# Patient Record
Sex: FEMALE | Race: BLACK OR AFRICAN AMERICAN | Employment: UNEMPLOYED | URBAN - METROPOLITAN AREA
[De-identification: names, ages, dates, MRNs, and addresses within clinical notes are randomized per-mention and may not be internally consistent; named-entity substitution may affect disease eponyms.]

---

## 2017-05-22 ENCOUNTER — HOSPITAL ENCOUNTER (INPATIENT)
Facility: HOSPITAL | Age: 47
LOS: 9 days | Discharge: HOME | DRG: 987 | End: 2017-05-31
Attending: HOSPITALIST | Admitting: HOSPITALIST
Payer: MEDICARE

## 2017-05-22 DIAGNOSIS — D57.00 SICKLE CELL CRISIS: ICD-10-CM

## 2017-05-22 DIAGNOSIS — A41.9 SEPSIS, DUE TO UNSPECIFIED ORGANISM: Primary | ICD-10-CM

## 2017-05-22 DIAGNOSIS — R00.0 TACHYCARDIA: ICD-10-CM

## 2017-05-22 DIAGNOSIS — M00.9 PYOGENIC ARTHRITIS OF RIGHT WRIST, DUE TO UNSPECIFIED ORGANISM: ICD-10-CM

## 2017-05-22 DIAGNOSIS — M87.9 OSTEONECROSIS: ICD-10-CM

## 2017-05-22 DIAGNOSIS — M86.00 ACUTE HEMATOGENOUS OSTEOMYELITIS, UNSPECIFIED SITE: ICD-10-CM

## 2017-05-22 DIAGNOSIS — R78.81 GRAM-NEGATIVE BACTEREMIA: ICD-10-CM

## 2017-05-22 PROCEDURE — 20600001 HC STEP DOWN PRIVATE ROOM

## 2017-05-22 PROCEDURE — 63600175 PHARM REV CODE 636 W HCPCS: Performed by: HOSPITALIST

## 2017-05-22 PROCEDURE — 99223 1ST HOSP IP/OBS HIGH 75: CPT | Mod: AI,,, | Performed by: NURSE PRACTITIONER

## 2017-05-22 RX ORDER — HYDROMORPHONE HYDROCHLORIDE 1 MG/ML
1 INJECTION, SOLUTION INTRAMUSCULAR; INTRAVENOUS; SUBCUTANEOUS ONCE
Status: COMPLETED | OUTPATIENT
Start: 2017-05-22 | End: 2017-05-22

## 2017-05-22 RX ADMIN — HYDROMORPHONE HYDROCHLORIDE 1 MG: 1 INJECTION, SOLUTION INTRAMUSCULAR; INTRAVENOUS; SUBCUTANEOUS at 11:05

## 2017-05-22 NOTE — PLAN OF CARE
Outside Transfer Acceptance Note    Transferring Physician or Mid Level Provider/Speciality: Bharati Martinez    Accepting Physician: Estelita Vann     Date of Acceptance: 05/22/2017     Code Status: Full    Transferring Facility/Hospital: [facility]    Reason for Transfer to [facility]: left shoulder pain, sickle cell crisis    Report from Transferring Physician or Mid-Level provider/Hospital course: 46 F with a PMH of HTN and sickle cell anemia who presented in sickle cell pain crisis. Since admit, she began having fevers and blood cx showed GNR.  She is also complaining of pain in both arms and bilateral forearms X rays are showing periosteal disease concerning for possible osteomyelitis versus metastatic disease.  PT WILL NEED BONE BIOPSY. She was originally started on cefepime for bacteremia, but is now on meropenem. She had a port in place due to mutiple admissions and poor IV access, but this is being removed today due to bacteremia.     To do list upon patient arrival: consult heme onc, ortho, and ID. Repeat imaging of the forearms.     Please call extension [phone] upon patient arrival to floor for Hospital Medicine admit team assignment and for additional admit orders. If patient is coming from another [facility] facility please also call [phone] to inform the admit team/office that patient has arrived from the [facility] facility to the floor so patient can be evaluated.

## 2017-05-23 PROBLEM — E87.6 HYPOKALEMIA: Status: ACTIVE | Noted: 2017-05-23

## 2017-05-23 PROBLEM — E83.42 HYPOMAGNESEMIA: Status: ACTIVE | Noted: 2017-05-23

## 2017-05-23 PROBLEM — M87.9 OSTEONECROSIS: Status: ACTIVE | Noted: 2017-05-23

## 2017-05-23 PROBLEM — R78.81 GRAM-NEGATIVE BACTEREMIA: Status: ACTIVE | Noted: 2017-05-23

## 2017-05-23 PROBLEM — E83.39 HYPOPHOSPHATEMIA: Status: ACTIVE | Noted: 2017-05-23

## 2017-05-23 LAB
ABO + RH BLD: NORMAL
ALBUMIN SERPL BCP-MCNC: 2 G/DL
ALP SERPL-CCNC: 154 U/L
ALT SERPL W/O P-5'-P-CCNC: 23 U/L
ANION GAP SERPL CALC-SCNC: 8 MMOL/L
ANION GAP SERPL CALC-SCNC: 8 MMOL/L
AST SERPL-CCNC: 39 U/L
BASOPHILS # BLD AUTO: 0.01 K/UL
BASOPHILS NFR BLD: 0.2 %
BILIRUB SERPL-MCNC: 1.3 MG/DL
BILIRUB UR QL STRIP: NEGATIVE
BLD GP AB SCN CELLS X3 SERPL QL: NORMAL
BLOOD GROUP ANTIBODIES SERPL: NORMAL
BUN SERPL-MCNC: 10 MG/DL
BUN SERPL-MCNC: 6 MG/DL
C DIFF GDH STL QL: NEGATIVE
C DIFF TOX A+B STL QL IA: NEGATIVE
CALCIUM SERPL-MCNC: 7.9 MG/DL
CALCIUM SERPL-MCNC: 8.4 MG/DL
CHLORIDE SERPL-SCNC: 104 MMOL/L
CHLORIDE SERPL-SCNC: 105 MMOL/L
CLARITY UR REFRACT.AUTO: CLEAR
CO2 SERPL-SCNC: 24 MMOL/L
CO2 SERPL-SCNC: 24 MMOL/L
COLOR UR AUTO: YELLOW
CREAT SERPL-MCNC: 0.6 MG/DL
CREAT SERPL-MCNC: 0.6 MG/DL
CRP SERPL-MCNC: 183.8 MG/L
DIFFERENTIAL METHOD: ABNORMAL
EOSINOPHIL # BLD AUTO: 0 K/UL
EOSINOPHIL NFR BLD: 0.4 %
ERYTHROCYTE [DISTWIDTH] IN BLOOD BY AUTOMATED COUNT: 23.8 %
ERYTHROCYTE [SEDIMENTATION RATE] IN BLOOD BY WESTERGREN METHOD: 140 MM/HR
EST. GFR  (AFRICAN AMERICAN): >60 ML/MIN/1.73 M^2
EST. GFR  (AFRICAN AMERICAN): >60 ML/MIN/1.73 M^2
EST. GFR  (NON AFRICAN AMERICAN): >60 ML/MIN/1.73 M^2
EST. GFR  (NON AFRICAN AMERICAN): >60 ML/MIN/1.73 M^2
GLUCOSE SERPL-MCNC: 88 MG/DL
GLUCOSE SERPL-MCNC: 92 MG/DL
GLUCOSE UR QL STRIP: NEGATIVE
HCT VFR BLD AUTO: 19.7 %
HGB BLD-MCNC: 6.6 G/DL
HGB UR QL STRIP: ABNORMAL
INR PPP: 1.3
KETONES UR QL STRIP: NEGATIVE
LACTATE SERPL-SCNC: 0.8 MMOL/L
LDH SERPL L TO P-CCNC: 447 U/L
LEUKOCYTE ESTERASE UR QL STRIP: NEGATIVE
LYMPHOCYTES # BLD AUTO: 0.7 K/UL
LYMPHOCYTES NFR BLD: 14.3 %
MAGNESIUM SERPL-MCNC: 1.4 MG/DL
MAGNESIUM SERPL-MCNC: 1.8 MG/DL
MCH RBC QN AUTO: 24.4 PG
MCHC RBC AUTO-ENTMCNC: 33.5 %
MCV RBC AUTO: 73 FL
MICROSCOPIC COMMENT: NORMAL
MONOCYTES # BLD AUTO: 0.7 K/UL
MONOCYTES NFR BLD: 13.7 %
NEUTROPHILS # BLD AUTO: 3.7 K/UL
NEUTROPHILS NFR BLD: 71.2 %
NITRITE UR QL STRIP: NEGATIVE
PH UR STRIP: 6 [PH] (ref 5–8)
PHOSPHATE SERPL-MCNC: 1.3 MG/DL
PLATELET # BLD AUTO: 148 K/UL
PMV BLD AUTO: 8.8 FL
POTASSIUM SERPL-SCNC: 2.8 MMOL/L
POTASSIUM SERPL-SCNC: 3.4 MMOL/L
PROCALCITONIN SERPL IA-MCNC: 118.59 NG/ML
PROT SERPL-MCNC: 7.4 G/DL
PROT UR QL STRIP: NEGATIVE
PROTHROMBIN TIME: 13.4 SEC
PTH-INTACT SERPL-MCNC: 82 PG/ML
RBC # BLD AUTO: 2.71 M/UL
RBC #/AREA URNS AUTO: 2 /HPF (ref 0–4)
RETICS/RBC NFR AUTO: 2.4 %
SODIUM SERPL-SCNC: 136 MMOL/L
SODIUM SERPL-SCNC: 137 MMOL/L
SP GR UR STRIP: 1.01 (ref 1–1.03)
SQUAMOUS #/AREA URNS AUTO: 0 /HPF
URN SPEC COLLECT METH UR: ABNORMAL
UROBILINOGEN UR STRIP-ACNC: NEGATIVE EU/DL
WBC # BLD AUTO: 5.19 K/UL
WBC #/AREA URNS AUTO: 1 /HPF (ref 0–5)

## 2017-05-23 PROCEDURE — 25000003 PHARM REV CODE 250: Performed by: INTERNAL MEDICINE

## 2017-05-23 PROCEDURE — 85610 PROTHROMBIN TIME: CPT

## 2017-05-23 PROCEDURE — 99233 SBSQ HOSP IP/OBS HIGH 50: CPT | Mod: GC,,, | Performed by: HOSPITALIST

## 2017-05-23 PROCEDURE — 25500020 PHARM REV CODE 255: Performed by: HOSPITALIST

## 2017-05-23 PROCEDURE — 83735 ASSAY OF MAGNESIUM: CPT

## 2017-05-23 PROCEDURE — 63600175 PHARM REV CODE 636 W HCPCS: Performed by: NURSE PRACTITIONER

## 2017-05-23 PROCEDURE — 83970 ASSAY OF PARATHORMONE: CPT

## 2017-05-23 PROCEDURE — 20600001 HC STEP DOWN PRIVATE ROOM

## 2017-05-23 PROCEDURE — 36415 COLL VENOUS BLD VENIPUNCTURE: CPT

## 2017-05-23 PROCEDURE — 86900 BLOOD TYPING SEROLOGIC ABO: CPT

## 2017-05-23 PROCEDURE — 86901 BLOOD TYPING SEROLOGIC RH(D): CPT

## 2017-05-23 PROCEDURE — P9016 RBC LEUKOCYTES REDUCED: HCPCS

## 2017-05-23 PROCEDURE — 85651 RBC SED RATE NONAUTOMATED: CPT

## 2017-05-23 PROCEDURE — 86905 BLOOD TYPING RBC ANTIGENS: CPT

## 2017-05-23 PROCEDURE — A9585 GADOBUTROL INJECTION: HCPCS | Performed by: HOSPITALIST

## 2017-05-23 PROCEDURE — 87086 URINE CULTURE/COLONY COUNT: CPT

## 2017-05-23 PROCEDURE — 25000003 PHARM REV CODE 250: Performed by: NURSE PRACTITIONER

## 2017-05-23 PROCEDURE — 36430 TRANSFUSION BLD/BLD COMPNT: CPT

## 2017-05-23 PROCEDURE — 86077 PHYS BLOOD BANK SERV XMATCH: CPT | Mod: ,,, | Performed by: PATHOLOGY

## 2017-05-23 PROCEDURE — 81001 URINALYSIS AUTO W/SCOPE: CPT

## 2017-05-23 PROCEDURE — 80048 BASIC METABOLIC PNL TOTAL CA: CPT

## 2017-05-23 PROCEDURE — 87186 SC STD MICRODIL/AGAR DIL: CPT

## 2017-05-23 PROCEDURE — 99222 1ST HOSP IP/OBS MODERATE 55: CPT | Mod: GC,,, | Performed by: INTERNAL MEDICINE

## 2017-05-23 PROCEDURE — 83605 ASSAY OF LACTIC ACID: CPT

## 2017-05-23 PROCEDURE — 93010 ELECTROCARDIOGRAM REPORT: CPT | Mod: ,,, | Performed by: INTERNAL MEDICINE

## 2017-05-23 PROCEDURE — 84100 ASSAY OF PHOSPHORUS: CPT

## 2017-05-23 PROCEDURE — 83735 ASSAY OF MAGNESIUM: CPT | Mod: 91

## 2017-05-23 PROCEDURE — 86140 C-REACTIVE PROTEIN: CPT

## 2017-05-23 PROCEDURE — 85045 AUTOMATED RETICULOCYTE COUNT: CPT

## 2017-05-23 PROCEDURE — 87040 BLOOD CULTURE FOR BACTERIA: CPT

## 2017-05-23 PROCEDURE — 86870 RBC ANTIBODY IDENTIFICATION: CPT

## 2017-05-23 PROCEDURE — 86922 COMPATIBILITY TEST ANTIGLOB: CPT

## 2017-05-23 PROCEDURE — 80053 COMPREHEN METABOLIC PANEL: CPT

## 2017-05-23 PROCEDURE — 87040 BLOOD CULTURE FOR BACTERIA: CPT | Mod: 59

## 2017-05-23 PROCEDURE — 93005 ELECTROCARDIOGRAM TRACING: CPT

## 2017-05-23 PROCEDURE — 63600175 PHARM REV CODE 636 W HCPCS: Performed by: INTERNAL MEDICINE

## 2017-05-23 PROCEDURE — 84145 PROCALCITONIN (PCT): CPT

## 2017-05-23 PROCEDURE — 83615 LACTATE (LD) (LDH) ENZYME: CPT

## 2017-05-23 PROCEDURE — 87449 NOS EACH ORGANISM AG IA: CPT

## 2017-05-23 PROCEDURE — 85025 COMPLETE CBC W/AUTO DIFF WBC: CPT

## 2017-05-23 RX ORDER — OXYCODONE HYDROCHLORIDE 5 MG/1
15 TABLET ORAL EVERY 6 HOURS PRN
Status: DISCONTINUED | OUTPATIENT
Start: 2017-05-23 | End: 2017-05-27

## 2017-05-23 RX ORDER — CITALOPRAM 20 MG/1
20 TABLET, FILM COATED ORAL DAILY
COMMUNITY

## 2017-05-23 RX ORDER — MEROPENEM AND SODIUM CHLORIDE 1 G/50ML
1 INJECTION, SOLUTION INTRAVENOUS
Status: DISCONTINUED | OUTPATIENT
Start: 2017-05-23 | End: 2017-05-23

## 2017-05-23 RX ORDER — AMOXICILLIN 250 MG
1 CAPSULE ORAL 2 TIMES DAILY
Status: DISCONTINUED | OUTPATIENT
Start: 2017-05-23 | End: 2017-05-31 | Stop reason: HOSPADM

## 2017-05-23 RX ORDER — POTASSIUM CHLORIDE 20 MEQ/1
40 TABLET, EXTENDED RELEASE ORAL EVERY 4 HOURS
Status: COMPLETED | OUTPATIENT
Start: 2017-05-23 | End: 2017-05-23

## 2017-05-23 RX ORDER — HYDROMORPHONE HYDROCHLORIDE 1 MG/ML
2 INJECTION, SOLUTION INTRAMUSCULAR; INTRAVENOUS; SUBCUTANEOUS ONCE
Status: COMPLETED | OUTPATIENT
Start: 2017-05-23 | End: 2017-05-23

## 2017-05-23 RX ORDER — OXYCODONE HYDROCHLORIDE 5 MG/1
10 TABLET ORAL EVERY 4 HOURS PRN
Status: DISCONTINUED | OUTPATIENT
Start: 2017-05-23 | End: 2017-05-27

## 2017-05-23 RX ORDER — CITALOPRAM 20 MG/1
20 TABLET, FILM COATED ORAL DAILY
Status: DISCONTINUED | OUTPATIENT
Start: 2017-05-23 | End: 2017-05-31 | Stop reason: HOSPADM

## 2017-05-23 RX ORDER — SODIUM CHLORIDE 9 MG/ML
INJECTION, SOLUTION INTRAVENOUS CONTINUOUS
Status: ACTIVE | OUTPATIENT
Start: 2017-05-23 | End: 2017-05-24

## 2017-05-23 RX ORDER — CLONAZEPAM 1 MG/1
1 TABLET ORAL 2 TIMES DAILY
COMMUNITY

## 2017-05-23 RX ORDER — MORPHINE SULFATE 30 MG/1
30 TABLET ORAL 2 TIMES DAILY PRN
COMMUNITY

## 2017-05-23 RX ORDER — LUBIPROSTONE 24 UG/1
24 CAPSULE ORAL 2 TIMES DAILY WITH MEALS
Status: DISCONTINUED | OUTPATIENT
Start: 2017-05-23 | End: 2017-05-31 | Stop reason: HOSPADM

## 2017-05-23 RX ORDER — ONDANSETRON 8 MG/1
8 TABLET, ORALLY DISINTEGRATING ORAL EVERY 8 HOURS PRN
Status: DISCONTINUED | OUTPATIENT
Start: 2017-05-23 | End: 2017-05-31 | Stop reason: HOSPADM

## 2017-05-23 RX ORDER — ZOLPIDEM TARTRATE 5 MG/1
5 TABLET ORAL NIGHTLY PRN
Status: DISCONTINUED | OUTPATIENT
Start: 2017-05-23 | End: 2017-05-29

## 2017-05-23 RX ORDER — MAGNESIUM SULFATE HEPTAHYDRATE 40 MG/ML
2 INJECTION, SOLUTION INTRAVENOUS ONCE
Status: COMPLETED | OUTPATIENT
Start: 2017-05-23 | End: 2017-05-23

## 2017-05-23 RX ORDER — HYDROMORPHONE HYDROCHLORIDE 1 MG/ML
1 INJECTION, SOLUTION INTRAMUSCULAR; INTRAVENOUS; SUBCUTANEOUS EVERY 4 HOURS PRN
Status: DISCONTINUED | OUTPATIENT
Start: 2017-05-23 | End: 2017-05-24

## 2017-05-23 RX ORDER — CLONAZEPAM 1 MG/1
1 TABLET ORAL 2 TIMES DAILY
Status: DISCONTINUED | OUTPATIENT
Start: 2017-05-23 | End: 2017-05-31 | Stop reason: HOSPADM

## 2017-05-23 RX ORDER — BUSPIRONE HYDROCHLORIDE 5 MG/1
15 TABLET ORAL 3 TIMES DAILY
Status: DISCONTINUED | OUTPATIENT
Start: 2017-05-23 | End: 2017-05-31 | Stop reason: HOSPADM

## 2017-05-23 RX ORDER — HYDROCODONE BITARTRATE AND ACETAMINOPHEN 500; 5 MG/1; MG/1
TABLET ORAL
Status: DISCONTINUED | OUTPATIENT
Start: 2017-05-23 | End: 2017-05-31 | Stop reason: HOSPADM

## 2017-05-23 RX ORDER — ONDANSETRON 2 MG/ML
4 INJECTION INTRAMUSCULAR; INTRAVENOUS EVERY 8 HOURS PRN
Status: DISCONTINUED | OUTPATIENT
Start: 2017-05-23 | End: 2017-05-31 | Stop reason: HOSPADM

## 2017-05-23 RX ORDER — LUBIPROSTONE 24 UG/1
24 CAPSULE ORAL 2 TIMES DAILY WITH MEALS
COMMUNITY

## 2017-05-23 RX ORDER — CARVEDILOL 6.25 MG/1
6.25 TABLET ORAL 2 TIMES DAILY WITH MEALS
Status: DISCONTINUED | OUTPATIENT
Start: 2017-05-23 | End: 2017-05-25

## 2017-05-23 RX ORDER — POTASSIUM CHLORIDE 750 MG/1
10 TABLET, EXTENDED RELEASE ORAL 2 TIMES DAILY
COMMUNITY

## 2017-05-23 RX ORDER — BUSPIRONE HYDROCHLORIDE 15 MG/1
15 TABLET ORAL 3 TIMES DAILY
COMMUNITY

## 2017-05-23 RX ORDER — OXYCODONE HYDROCHLORIDE 5 MG/1
5 TABLET ORAL EVERY 4 HOURS PRN
COMMUNITY
End: 2017-06-30

## 2017-05-23 RX ORDER — CYCLOBENZAPRINE HCL 10 MG
10 TABLET ORAL 3 TIMES DAILY PRN
Status: DISCONTINUED | OUTPATIENT
Start: 2017-05-23 | End: 2017-05-31 | Stop reason: HOSPADM

## 2017-05-23 RX ORDER — CEFEPIME HYDROCHLORIDE 2 G/50ML
2 INJECTION, SOLUTION INTRAVENOUS
Status: DISCONTINUED | OUTPATIENT
Start: 2017-05-23 | End: 2017-05-28

## 2017-05-23 RX ORDER — GADOBUTROL 604.72 MG/ML
8 INJECTION INTRAVENOUS
Status: COMPLETED | OUTPATIENT
Start: 2017-05-23 | End: 2017-05-23

## 2017-05-23 RX ORDER — ZIPRASIDONE HYDROCHLORIDE 20 MG/1
40 CAPSULE ORAL 2 TIMES DAILY WITH MEALS
Status: DISCONTINUED | OUTPATIENT
Start: 2017-05-23 | End: 2017-05-31 | Stop reason: HOSPADM

## 2017-05-23 RX ORDER — MORPHINE SULFATE 30 MG/1
30 TABLET, FILM COATED, EXTENDED RELEASE ORAL EVERY 12 HOURS
Status: DISCONTINUED | OUTPATIENT
Start: 2017-05-23 | End: 2017-05-31 | Stop reason: HOSPADM

## 2017-05-23 RX ORDER — ACETAMINOPHEN 325 MG/1
650 TABLET ORAL EVERY 6 HOURS PRN
Status: DISCONTINUED | OUTPATIENT
Start: 2017-05-23 | End: 2017-05-31 | Stop reason: HOSPADM

## 2017-05-23 RX ORDER — ZIPRASIDONE HYDROCHLORIDE 40 MG/1
40 CAPSULE ORAL 2 TIMES DAILY WITH MEALS
COMMUNITY

## 2017-05-23 RX ORDER — POTASSIUM CHLORIDE 7.45 MG/ML
10 INJECTION INTRAVENOUS
Status: COMPLETED | OUTPATIENT
Start: 2017-05-23 | End: 2017-05-23

## 2017-05-23 RX ADMIN — HYDROMORPHONE HYDROCHLORIDE 1 MG: 1 INJECTION, SOLUTION INTRAMUSCULAR; INTRAVENOUS; SUBCUTANEOUS at 11:05

## 2017-05-23 RX ADMIN — POTASSIUM CHLORIDE 40 MEQ: 1500 TABLET, EXTENDED RELEASE ORAL at 05:05

## 2017-05-23 RX ADMIN — HYDROMORPHONE HYDROCHLORIDE 2 MG: 1 INJECTION, SOLUTION INTRAMUSCULAR; INTRAVENOUS; SUBCUTANEOUS at 01:05

## 2017-05-23 RX ADMIN — POTASSIUM CHLORIDE 10 MEQ: 10 INJECTION, SOLUTION INTRAVENOUS at 01:05

## 2017-05-23 RX ADMIN — CLONAZEPAM 1 MG: 0.5 TABLET ORAL at 08:05

## 2017-05-23 RX ADMIN — CITALOPRAM HYDROBROMIDE 20 MG: 20 TABLET ORAL at 08:05

## 2017-05-23 RX ADMIN — MEROPENEM AND SODIUM CHLORIDE 1 G: 1 INJECTION, SOLUTION INTRAVENOUS at 04:05

## 2017-05-23 RX ADMIN — ZIPRASIDONE HYDROCHLORIDE 40 MG: 40 CAPSULE ORAL at 05:05

## 2017-05-23 RX ADMIN — BUSPIRONE HYDROCHLORIDE 15 MG: 5 TABLET ORAL at 09:05

## 2017-05-23 RX ADMIN — POTASSIUM CHLORIDE 40 MEQ: 1500 TABLET, EXTENDED RELEASE ORAL at 01:05

## 2017-05-23 RX ADMIN — GADOBUTROL 8 ML: 604.72 INJECTION INTRAVENOUS at 06:05

## 2017-05-23 RX ADMIN — HYDROMORPHONE HYDROCHLORIDE 1 MG: 1 INJECTION, SOLUTION INTRAMUSCULAR; INTRAVENOUS; SUBCUTANEOUS at 03:05

## 2017-05-23 RX ADMIN — SODIUM PHOSPHATE, MONOBASIC, MONOHYDRATE 30 MMOL: 276; 142 INJECTION, SOLUTION INTRAVENOUS at 04:05

## 2017-05-23 RX ADMIN — OXYCODONE HYDROCHLORIDE 15 MG: 5 TABLET ORAL at 06:05

## 2017-05-23 RX ADMIN — ZIPRASIDONE HYDROCHLORIDE 40 MG: 40 CAPSULE ORAL at 08:05

## 2017-05-23 RX ADMIN — MEROPENEM AND SODIUM CHLORIDE 1 G: 1 INJECTION, SOLUTION INTRAVENOUS at 12:05

## 2017-05-23 RX ADMIN — BUSPIRONE HYDROCHLORIDE 15 MG: 5 TABLET ORAL at 03:05

## 2017-05-23 RX ADMIN — OXYCODONE HYDROCHLORIDE 15 MG: 5 TABLET ORAL at 08:05

## 2017-05-23 RX ADMIN — MORPHINE SULFATE 30 MG: 30 TABLET, EXTENDED RELEASE ORAL at 09:05

## 2017-05-23 RX ADMIN — HYDROMORPHONE HYDROCHLORIDE 1 MG: 1 INJECTION, SOLUTION INTRAMUSCULAR; INTRAVENOUS; SUBCUTANEOUS at 07:05

## 2017-05-23 RX ADMIN — STANDARDIZED SENNA CONCENTRATE AND DOCUSATE SODIUM 1 TABLET: 8.6; 5 TABLET, FILM COATED ORAL at 09:05

## 2017-05-23 RX ADMIN — CLONAZEPAM 1 MG: 0.5 TABLET ORAL at 09:05

## 2017-05-23 RX ADMIN — MORPHINE SULFATE 30 MG: 30 TABLET, EXTENDED RELEASE ORAL at 12:05

## 2017-05-23 RX ADMIN — CARVEDILOL 6.25 MG: 6.25 TABLET, FILM COATED ORAL at 06:05

## 2017-05-23 RX ADMIN — OXYCODONE HYDROCHLORIDE 15 MG: 5 TABLET ORAL at 03:05

## 2017-05-23 RX ADMIN — BUSPIRONE HYDROCHLORIDE 15 MG: 5 TABLET ORAL at 05:05

## 2017-05-23 RX ADMIN — POTASSIUM CHLORIDE 10 MEQ: 10 INJECTION, SOLUTION INTRAVENOUS at 03:05

## 2017-05-23 RX ADMIN — CARVEDILOL 6.25 MG: 6.25 TABLET, FILM COATED ORAL at 08:05

## 2017-05-23 RX ADMIN — MAGNESIUM SULFATE IN WATER 2 G: 40 INJECTION, SOLUTION INTRAVENOUS at 01:05

## 2017-05-23 NOTE — PLAN OF CARE
Problem: Patient Care Overview  Goal: Plan of Care Review  Outcome: Ongoing (interventions implemented as appropriate)  Pt remains free from falls and injury. Plan of care reviewed with pt. Pt understands plan. Pt c/o bilateral pain in upper and lower extremities, VSS. Plans to give 1 unit of RBC were discussed once antibody identification is complete. Blood was needed to be ordered outside of [facility]. Will continue to monitor.

## 2017-05-23 NOTE — H&P
"[facility] Hospital Medicine  History & Physical    Patient Name: Yamila Lan  MRN: [id number]  Admission Date: 5/22/2017  Attending Physician: Amanda Heredia MD   Primary Care Provider: Silvana Dhaliwal MD    [facility] Medicine Team: Networked reference to record PCT  Smita Aragon NP     Patient information was obtained from patient and OSH transfer records.     Subjective:     Principal Problem:Osteonecrosis    Chief Complaint: No chief complaint on file.       HPI: Patient is a 46 y.o. female with significant past medical history of HTN and sickle cell dz was transferred from [facility]. Pt had originally presented to the hospital c/o sickle cell crisis; specifically bilateral arm pain (L > R) for two weeks. Pt thought pain may have been related to a fall she sustained approximately 1 month ago.  She also endorses h/o fever, chills and body aches. While admitted the pt began having fevers and Bcx grew out GNR (initally tx with cefepime, now on meropenem). BUE xrays are showing periosteal dz concerning for possible osteomyelitis vs metastatic dz. Prior to admission pt had port in place 2/2 poor venous access.     Latest labs at OSH revealed WBC 7.1, anemia (H/H 6.3/19), retic count 4.7, mild hypokalemia (K 3.1), mild hypomagnesemia (Mag 1.5), ESR & CRP elevated (118 and 103 respectively), lactic 1.1; INR 1.4, UA neg for infection, UPT neg. Bcx preliminary results showing GNR in both aerobic and anaerobic bottles. Xray LUE  showed "osseous erosive lesions within the mid humeral diaphysis bone marrow with additional destructive lesions noted in the proximal radial diaphysis. Shoulder and elbow joints intact. Findings concerning for metastatic disease to humerus and proximal radius." Xray RUE revealed "destructive mauro lesion within mid humeral diaphyseal bone marrow. Destructive lesions additional seening involiving radius & ulna. Should and elbow joints intact." Pt also had " "MRI of left shoulder performed revealing changes consistent with "early osteonecrosis along superior and medial aspect of left humeral head."  CXRY was neg for acute cardiopulmonary process. Pt was originally supposed to be transfused 3 units PRBCs, however pt has h/o antibodies with difficulty matching blood and had a questionable reaction to one of the units. It is unclear from chart how many units were received. Per OSH records, the pt's port was d/c'd 2/2 bacteremia and PICC was placed, however it appears that this did not occur and pt's port is still in place. L shoulder arthrocentesis was performed, gram stain without organisms or WBCs.       Past Medical History:   Diagnosis Date    Hypertension     Sickle cell anemia        Past Surgical History:   Procedure Laterality Date     SECTION      LEG SURGERY      PORTACATH PLACEMENT      portacath removal      TUBAL LIGATION         Review of patient's allergies indicates:   Allergen Reactions    Floxin [ofloxacin]     Folic acid containing drugs     Toradol [ketorolac]        No current facility-administered medications on file prior to encounter.      Current Outpatient Prescriptions on File Prior to Encounter   Medication Sig    carvedilol (COREG) 6.25 MG tablet Take 6.25 mg by mouth 2 (two) times daily with meals.    cyclobenzaprine (FLEXERIL) 5 MG tablet Take 10 mg by mouth once daily.     zolpidem (AMBIEN) 5 MG Tab Take 5 mg by mouth nightly as needed.    [DISCONTINUED] oxycodone (OXYCONTIN) 10 MG 12 hr tablet Take 10 mg by mouth every 12 (twelve) hours.     Family History     None        Social History Main Topics    Smoking status: Never Smoker    Smokeless tobacco: Not on file    Alcohol use No    Drug use: No    Sexual activity: Not Currently     Review of Systems   Constitutional: Positive for chills and fever. Negative for appetite change.   HENT: Negative.    Eyes: Negative.    Respiratory: Negative.    Cardiovascular: " Negative.    Gastrointestinal: Negative.    Endocrine: Negative.    Genitourinary: Negative.    Musculoskeletal: Positive for arthralgias and myalgias. Negative for joint swelling.   Skin: Negative.    Allergic/Immunologic: Negative.    Neurological: Negative.    Psychiatric/Behavioral: Negative.      Objective:     Vital Signs (Most Recent):  Temp: 98.9 °F (37.2 °C) (05/22/17 2305)  Pulse: (!) 113 (05/22/17 2305)  Resp: 20 (05/22/17 2305)  BP: 117/75 (05/22/17 2305)  SpO2: 96 % (05/22/17 2305) Vital Signs (24h Range):  Temp:  [98.9 °F (37.2 °C)] 98.9 °F (37.2 °C)  Pulse:  [113] 113  Resp:  [20] 20  SpO2:  [96 %] 96 %  BP: (117)/(75) 117/75     Weight: 76.6 kg (168 lb 14 oz)  Body mass index is 28.99 kg/m².    Physical Exam   Constitutional: She is oriented to person, place, and time. She appears well-developed and well-nourished. No distress.   HENT:   Head: Normocephalic and atraumatic.   Right Ear: External ear normal.   Left Ear: External ear normal.   Nose: Nose normal.   Mouth/Throat: Oropharynx is clear and moist.   Eyes: Conjunctivae and EOM are normal. Pupils are equal, round, and reactive to light.   Neck: Normal range of motion. Neck supple.   Cardiovascular: Normal rate, regular rhythm, normal heart sounds and intact distal pulses.    Pulmonary/Chest: Effort normal and breath sounds normal. No respiratory distress. She has no wheezes. She has no rales. She exhibits no tenderness.   Left subclavian port   Abdominal: Soft. Bowel sounds are normal. She exhibits no distension. There is no tenderness.   Musculoskeletal: Normal range of motion. She exhibits tenderness. She exhibits no edema or deformity.   Pain x4 extremities, L arm > R arm; BLE pain greatest just distal to knees   Neurological: She is alert and oriented to person, place, and time.   Skin: Skin is warm and dry. Capillary refill takes less than 2 seconds. She is not diaphoretic.   Psychiatric: She has a normal mood and affect. Her behavior is  normal. Judgment and thought content normal.   Nursing note and vitals reviewed.       Significant Labs:   Recent Lab Results       05/23/17  0012      Albumin 2.0(L)     Alkaline Phosphatase 154(H)     ALT 23     Anion Gap 8     AST 39     Total Bilirubin 1.3  Comment:  For infants and newborns, interpretation of results should be based  on gestational age, weight and in agreement with clinical  observations.  Premature Infant recommended reference ranges:  Up to 24 hours.............<8.0 mg/dL  Up to 48 hours............<12.0 mg/dL  3-5 days..................<15.0 mg/dL  6-29 days.................<15.0 mg/dL  (H)     BUN, Bld 10     Calcium 8.4(L)     Chloride 104     CO2 24     Creatinine 0.6     eGFR if African American >60.0     eGFR if non  >60.0  Comment:  Calculation used to obtain the estimated glomerular filtration  rate (eGFR) is the CKD-EPI equation. Since race is unknown   in our information system, the eGFR values for   -American and Non--American patients are given   for each creatinine result.       Glucose 92     Group & Rh O POS     INDIRECT HAWK POS     Magnesium 1.4(L)     Phosphorus 1.3(L)     Potassium 2.8(L)     Total Protein 7.4     Retic 2.4     Sodium 136         All pertinent labs within the past 24 hours have been reviewed.    Significant Imaging: OSH radiology reads reviewed    Assessment/Plan:     * Osteonecrosis    - BUE pain with evidence of osteonecrosis on xrays concerning for osteomyelitis vs malignancy  - obtaining MRI BUE with & without contrast  - new c/o BLE pain - obtaining xrays BLE, if evidence of osteonecrosis on xray, will need MRI of BLE as well  - Ortho, Heme/onc & ID consulted, awaiting eval & recommendations  - NPO for possible bone marrow biopsy in am        Gram-negative bacteremia    - Bcx at [facility] with preliminary result of GNR in aerobic & anaerobic bottles  - re-checking Bcx here (x2 peripheral and x1 from port)  - continue  meropenem initiated at [facility]  - at this time pt does not want port removed even though it is potential source of bacteremia  - ID consult as above          Sickle cell crisis    - H/H 6.3/18 at OSH, no improvement with transfusion 1 unit PRBCs  - re-checking on admission, if < 7 will transfuse; pt with h/o antibodies making it difficult to match blood          Hypophosphatemia    - replacing          Hypomagnesemia    - replacing  - on multiple QTc prolonging medications; checking EKG to eval QTc and monitor on telemetry          Hypokalemia    - replacing            VTE Risk Mitigation     None        Smita Aragon NP  Department of Hospital Medicine   [facility]    Addendum: pt now reporting diarrhea. Will check stool for c diff prior to antimotility agent given multiple prior hospitalizations in past month and abx use.

## 2017-05-23 NOTE — ASSESSMENT & PLAN NOTE
- preliminary blood cx from [facility] growing Serratia with sensitivities likely to result by 5/24  - will d/c meropenem and start cefepime 2g q 12 hours  - if blood cultures drawn here remain negative, patient can keep port that's currently in place. Otherwise, if positive, port will need to be removed which was discussed with the patient  - will follow up cultures here  - will need primary team to obtain outside cultures

## 2017-05-23 NOTE — PLAN OF CARE
Cm met with pt. She wanted me to change her emergency contact to her mother: Perla at .   PCP Dr. Crawford in [place] whom she will f/u with bc she rather not travel back here.  Heme-Onc, ID, and ortho consulted.  Pt has ride home  Electrolyte replacement  H/h monitoring.  One unit prbc transfused.  Oxygen.     05/23/17 1258   Discharge Assessment   Assessment Type Discharge Planning Assessment   Confirmed/corrected address and phone number on facesheet? Yes   Assessment information obtained from? Patient   Expected Length of Stay (days) 3   Communicated expected length of stay with patient/caregiver yes   Prior to hospitilization cognitive status: Alert/Oriented;No Deficits   Prior to hospitalization functional status: Independent   Current cognitive status: Alert/Oriented;No Deficits   Current Functional Status: Independent   Arrived From home or self-care   Lives With alone   Able to Return to Prior Arrangements yes   Is patient able to care for self after discharge? Yes   How many people do you have in your home that can help with your care after discharge? 1   Who are your caregiver(s) and their phone number(s)? Perla    Patient's perception of discharge disposition home or selfcare   Readmission Within The Last 30 Days no previous admission in last 30 days   Patient currently being followed by outpatient case management? No   Patient currently receives home health services? No   Does the patient currently use HME? Yes   Patient currently receives private duty nursing? N/A   Patient currently receives any other outside agency services? No   Equipment Currently Used at Home walker, rolling   Do you have any problems affording any of your prescribed medications? No   Is the patient taking medications as prescribed? yes   Do you have any financial concerns preventing you from receiving the healthcare you need? No   Does the patient have transportation to healthcare appointments? Yes    Transportation Available family or friend will provide   On Dialysis? No   Does the patient receive services at the Coumadin Clinic? No   Are there any open cases? No   Discharge Plan A Home   Discharge Plan B Home with family   Patient/Family In Agreement With Plan yes

## 2017-05-23 NOTE — CONSULTS
Oncology Treatment Plan:   [No treatment plan]     HPI: Ms. Lan is a 46 year old female with history of sickle cell disease who presented to [facility] complaining of bilateral arm pain L>R that had been persistent for about 2 weeks. She states that she experienced a fall and extended both arms out to catch her fall. Patient denied fevers chills, night sweats. She denies diarrhea. At the OSH, she developed fever after receiving a blood transfusion. Blood cultures grew gram negative rods. BLUE X rays were concerning for erosive bone process with osteomyelitis vs metastatic dz per outside report. Patient has had a port in place for five years. She denies tenderness or erythema at this site. She is hesitant to have port removed. Has had diarrhea since starting abx for positive blood cultures. Patient was been transferred for higher level of care.   She denies ever requiring exchange transfusion, no hx of ACS, no hx of mechanical ventilation or CVA.   Patient had a leg surgery for gangrene.  C/S and Tubal ligation  Port removal and placement      Medications:  Continuous Infusions:   sodium chloride 0.9%       Scheduled Meds:   busPIRone  15 mg Oral TID    carvedilol  6.25 mg Oral BID WM    citalopram  20 mg Oral Daily    clonazePAM  1 mg Oral BID    lubiprostone  24 mcg Oral BID WM    meropenem-0.9% sodium chloride  1 g Intravenous Q8H    morphine  30 mg Oral Q12H    senna-docusate 8.6-50 mg  1 tablet Oral BID    ziprasidone  40 mg Oral BID WM     PRN Meds:sodium chloride, acetaminophen, cyclobenzaprine, HYDROmorphone, ondansetron, ondansetron, oxycodone, oxycodone, zolpidem     Review of patient's allergies indicates:   Allergen Reactions    Floxin [ofloxacin]     Folic acid containing drugs     Toradol [ketorolac]         Past Medical History:   Diagnosis Date    Hypertension     Sickle cell anemia      Past Surgical History:   Procedure Laterality Date     SECTION       LEG SURGERY      PORTACATH PLACEMENT      portacath removal      TUBAL LIGATION       Family History     None        Social History Main Topics    Smoking status: Never Smoker    Smokeless tobacco: Not on file    Alcohol use No    Drug use: No    Sexual activity: Not Currently       Review of Systems   Constitutional: Positive for fever. Negative for fatigue.   HENT: Negative for congestion and sore throat.    Eyes: Negative for photophobia and visual disturbance.   Respiratory: Negative for cough and shortness of breath.    Cardiovascular: Negative for chest pain and leg swelling.   Gastrointestinal: Positive for diarrhea. Negative for abdominal distention, abdominal pain and nausea.   Endocrine: Negative for polyphagia and polyuria.   Genitourinary: Negative for dysuria and hematuria.   Musculoskeletal: Positive for arthralgias and myalgias.   Skin: Negative for color change and pallor.   Neurological: Negative for light-headedness and headaches.   Hematological: Negative for adenopathy. Does not bruise/bleed easily.   Psychiatric/Behavioral: Negative for agitation and behavioral problems.     Objective:     Vital Signs (Most Recent):  Temp: 98.6 °F (37 °C) (05/23/17 0826)  Pulse: 87 (05/23/17 1230)  Resp: 17 (05/23/17 1230)  BP: 95/60 (05/23/17 1230)  SpO2: 100 % (05/23/17 1230) Vital Signs (24h Range):  Temp:  [98.6 °F (37 °C)-98.9 °F (37.2 °C)] 98.6 °F (37 °C)  Pulse:  [] 87  Resp:  [17-20] 17  SpO2:  [96 %-100 %] 100 %  BP: ()/(60-82) 95/60     Weight: 76.6 kg (168 lb 14 oz)  Body mass index is 28.99 kg/m².  Body surface area is 1.86 meters squared.      Intake/Output Summary (Last 24 hours) at 05/23/17 1317  Last data filed at 05/23/17 1100   Gross per 24 hour   Intake              180 ml   Output              500 ml   Net             -320 ml       Physical Exam   Constitutional: She is oriented to person, place, and time. She appears well-developed and well-nourished.   HENT:    Mouth/Throat: Oropharynx is clear and moist. No oropharyngeal exudate.   Eyes: Conjunctivae are normal. Pupils are equal, round, and reactive to light.   Cardiovascular: Normal rate and regular rhythm.    Pulmonary/Chest: Effort normal and breath sounds normal.   Abdominal: Soft. Bowel sounds are normal.   Musculoskeletal: She exhibits deformity.   Lymphadenopathy:     She has no cervical adenopathy.   Neurological: She is alert and oriented to person, place, and time.   Skin: Skin is warm and dry.   Psychiatric: She has a normal mood and affect. Her behavior is normal.       Significant Labs:   CBC:   Recent Labs  Lab 05/23/17  0012   WBC 5.19   HGB 6.6*   HCT 19.7*   *    and CMP:   Recent Labs  Lab 05/23/17  0012      K 2.8*      CO2 24   GLU 92   BUN 10   CREATININE 0.6   CALCIUM 8.4*   PROT 7.4   ALBUMIN 2.0*   BILITOT 1.3*   ALKPHOS 154*   AST 39   ALT 23   ANIONGAP 8   EGFRNONAA >60.0       Diagnostic Results:  None     A/P    Sickle cell crisis - patient is on MS contin 30 mg BID at home, will resume, cont dilaudid prn pain, hydration (noted allergy to folic acid.?)    Bone lesions - suspect lesions are related to bony infarcts, follow up blood cultures from OSH and from present hospitalization. F/u repeat imaging results. Ortho has been consulted, will follow their recs. Review for possible fracture    GNR bacteremia - f/u culture results. Cont empiric abx    Virginia Mccormick MD   Pager [phone]

## 2017-05-23 NOTE — HPI
"Patient is a 46 y.o. female with significant past medical history of HTN and sickle cell dz was transferred from [facility]. Pt had originally presented to the hospital c/o sickle cell crisis; specifically bilateral arm pain (L > R) for two weeks. Pt thought pain may have been related to a fall she sustained approximately 1 month ago.  She also endorses h/o fever, chills and body aches. While admitted the pt began having fevers and Bcx grew out GNR (initally tx with cefepime, now on meropenem). BUE xrays are showing periosteal dz concerning for possible osteomyelitis vs metastatic dz. Prior to admission pt had port in place 2/2 poor venous access.     Latest labs at OSH revealed WBC 7.1, anemia (H/H 6.3/19), retic count 4.7, mild hypokalemia (K 3.1), mild hypomagnesemia (Mag 1.5), ESR & CRP elevated (118 and 103 respectively), lactic 1.1; INR 1.4, UA neg for infection, UPT neg. Bcx preliminary results showing GNR in both aerobic and anaerobic bottles. Xray LUE  showed "osseous erosive lesions within the mid humeral diaphysis bone marrow with additional destructive lesions noted in the proximal radial diaphysis. Shoulder and elbow joints intact. Findings concerning for metastatic disease to humerus and proximal radius." Xray RUE revealed "destructive mauro lesion within mid humeral diaphyseal bone marrow. Destructive lesions additional seening involiving radius & ulna. Should and elbow joints intact." Pt also had MRI of left shoulder performed revealing changes consistent with "early osteonecrosis along superior and medial aspect of left humeral head."  CXRY was neg for acute cardiopulmonary process. Pt was originally supposed to be transfused 3 units PRBCs, however pt has h/o antibodies with difficulty matching blood and had a questionable reaction to one of the units. It is unclear from chart how many units were received. Per OSH records, the pt's port was d/c'd 2/2 bacteremia and PICC was placed, " however it appears that this did not occur and pt's port is still in place. L shoulder arthrocentesis was performed, gram stain without organisms or WBCs.

## 2017-05-23 NOTE — SUBJECTIVE & OBJECTIVE
Past Medical History:   Diagnosis Date    Hypertension     Sickle cell anemia        Past Surgical History:   Procedure Laterality Date     SECTION      LEG SURGERY      PORTACATH PLACEMENT      portacath removal      TUBAL LIGATION         Review of patient's allergies indicates:   Allergen Reactions    Floxin [ofloxacin]     Folic acid containing drugs     Toradol [ketorolac]        Medications:  Prescriptions Prior to Admission   Medication Sig    busPIRone (BUSPAR) 15 MG tablet Take 15 mg by mouth 3 (three) times daily. 3-4 x/day    citalopram (CELEXA) 20 MG tablet Take 20 mg by mouth once daily.    clonazePAM (KLONOPIN) 1 MG tablet Take 1 mg by mouth 2 (two) times daily.    lubiprostone (AMITIZA) 24 MCG Cap Take 24 mcg by mouth 2 (two) times daily with meals.    morphine (MSIR) 30 MG tablet Take 30 mg by mouth 2 (two) times daily as needed for Pain.    oxycodone (ROXICODONE) 5 MG immediate release tablet Take 5 mg by mouth every 4 (four) hours as needed for Pain.    potassium chloride SA (K-DUR,KLOR-CON) 10 MEQ tablet Take 10 mEq by mouth 2 (two) times daily.    ziprasidone (GEODON) 40 MG Cap Take 40 mg by mouth 2 (two) times daily with meals.    carvedilol (COREG) 6.25 MG tablet Take 6.25 mg by mouth 2 (two) times daily with meals.    cyclobenzaprine (FLEXERIL) 5 MG tablet Take 10 mg by mouth once daily.     zolpidem (AMBIEN) 5 MG Tab Take 5 mg by mouth nightly as needed.     Antibiotics     Start     Stop Route Frequency Ordered    17 1500  ceFEPIme in dextrose 5% 2 gram/50 mL IVPB 2 g      -- IV Every 12 hours (non-standard times) 17 1353        Antifungals     None        Antivirals     None             There is no immunization history on file for this patient.    Family History     None        Social History     Social History    Marital status: Single     Spouse name: N/A    Number of children: N/A    Years of education: N/A     Social History Main Topics     Smoking status: Never Smoker    Smokeless tobacco: None    Alcohol use No    Drug use: No    Sexual activity: Not Currently     Other Topics Concern    None     Social History Narrative    None     Review of Systems   Constitutional: Positive for appetite change, fatigue and unexpected weight change. Negative for chills and fever.   HENT: Negative for congestion.    Eyes: Negative for visual disturbance.   Respiratory: Negative for cough and shortness of breath.    Cardiovascular: Negative for chest pain.   Gastrointestinal: Positive for diarrhea. Negative for abdominal pain and nausea.   Genitourinary: Negative for dysuria.   Musculoskeletal: Positive for arthralgias.   Skin: Negative for rash.   Neurological: Negative for headaches.   Psychiatric/Behavioral: Negative for dysphoric mood.     Objective:     Vital Signs (Most Recent):  Temp: 98.6 °F (37 °C) (05/23/17 0826)  Pulse: 87 (05/23/17 1230)  Resp: 17 (05/23/17 1230)  BP: 95/60 (05/23/17 1230)  SpO2: 100 % (05/23/17 1230) Vital Signs (24h Range):  Temp:  [98.6 °F (37 °C)-98.9 °F (37.2 °C)] 98.6 °F (37 °C)  Pulse:  [] 87  Resp:  [17-20] 17  SpO2:  [96 %-100 %] 100 %  BP: ()/(60-82) 95/60     Weight: 76.6 kg (168 lb 14 oz)  Body mass index is 28.99 kg/m².    Estimated Creatinine Clearance: 119.9 mL/min (based on Cr of 0.6).    Physical Exam   Constitutional: She is oriented to person, place, and time. She appears well-developed and well-nourished. No distress.   HENT:   Head: Normocephalic and atraumatic.   Eyes: EOM are normal. Pupils are equal, round, and reactive to light.   Neck: Normal range of motion. Neck supple.   Cardiovascular: Normal rate, regular rhythm, normal heart sounds and intact distal pulses.    Pulmonary/Chest:   Left subclavian port without surrounding tenderness, erythema or edema   Abdominal: Soft. Bowel sounds are normal. She exhibits no distension. There is no tenderness.   Musculoskeletal: Normal range of motion. She  exhibits tenderness. She exhibits no edema or deformity.   Pain x4 extremities, L arm > R arm; BLE pain greatest just distal to knees   Neurological: She is alert and oriented to person, place, and time.   Skin: Skin is warm. Capillary refill takes less than 2 seconds.   Psychiatric: She has a normal mood and affect. Her behavior is normal. Judgment and thought content normal.   Nursing note and vitals reviewed.      Significant Labs:   Microbiology Results (last 7 days)     Procedure Component Value Units Date/Time    Clostridium difficile EIA [[id number]] Collected:  05/23/17 0310    Order Status:  Completed Specimen:  Stool from Stool Updated:  05/23/17 1045     C. diff Antigen Negative     C difficile Toxins A+B, EIA Negative     Comment: Testing not recommended for children <24 months old.       Urine culture [[id number]]     Order Status:  No result Specimen:  Urine     Blood culture [[id number]] Collected:  05/23/17 0012    Order Status:  Completed Specimen:  Blood Updated:  05/23/17 0945     Blood Culture, Routine No Growth to date    Narrative:       Collection has been rescheduled by RNS at 5/22/2017 22:49 Reason: no   armband.  Collection has been rescheduled by RNS at 5/22/2017 22:56 Reason: pt.   in restroom.  Collection has been rescheduled by RNS at 5/22/2017 23:16 Reason: no   armband\ Lew notified they are still waiting to print it out.  Collection has been rescheduled by RNS at 5/22/2017 22:49 Reason: no   armband.  Collection has been rescheduled by RNS at 5/22/2017 22:56 Reason: pt.   in restroom.  Collection has been rescheduled by RNS at 5/22/2017 23:16 Reason: no   armband\ Lew notified they are still waiting to print it out.    Blood culture [[id number]] Collected:  05/23/17 0233    Order Status:  Completed Specimen:  Blood from Line, Port A Cath Updated:  05/23/17 0945     Blood Culture, Routine No Growth to date    Narrative:       From port    Blood culture [[id number]] Collected:   05/23/17 0012    Order Status:  Completed Specimen:  Blood Updated:  05/23/17 0945     Blood Culture, Routine No Growth to date    Narrative:       Collection has been rescheduled by RNS at 5/22/2017 22:49 Reason: no   armband.  Collection has been rescheduled by RNS at 5/22/2017 22:56 Reason: pt.   in restroom.  Collection has been rescheduled by RNS at 5/22/2017 23:16 Reason: no   armband\ Lew notified they are still waiting to print it out.  Collection has been rescheduled by RNS at 5/22/2017 22:49 Reason: no   armband.  Collection has been rescheduled by RNS at 5/22/2017 22:56 Reason: pt.   in restroom.  Collection has been rescheduled by RNS at 5/22/2017 23:16 Reason: no   armband\ Lew notified they are still waiting to print it out.          Significant Imaging: I have reviewed all pertinent imaging results/findings within the past 24 hours.

## 2017-05-23 NOTE — PROGRESS NOTES
Patient admitted to [facility]. Patient arrived to the floor from outside facility via stretcher by EMS. VSS and NAND. No complaints at this time. Patient oriented to room and unit. Plan of care initiated. Bed in lowest lock position. Side rails up X 2. Call bed in reach. Will Continue to monitor patient.

## 2017-05-23 NOTE — SUBJECTIVE & OBJECTIVE
Interval History: NAEON. This morning, pt reports feeling about the same. Pain in both arms and also legs. Pt reports her usual sickle crisis tends to start in the legs mostly, though this time pain started in the arms. No CP, SOB at rest, or cough. Pt does report SOB with exertion. No repeat fevers. Pt denies nausea/vomit/abd pain, but does report diarrhea for ~2 weeks. No other new complaints.    Review of Systems   Constitutional: Positive for chills, fatigue and fever. Negative for appetite change.   HENT: Negative.  Negative for sinus pressure and sore throat.    Eyes: Negative.  Negative for visual disturbance.   Respiratory: Positive for shortness of breath (with exertion). Negative for cough.    Cardiovascular: Negative.  Negative for chest pain and palpitations.   Gastrointestinal: Positive for diarrhea (for 2 weeks, non-bloody). Negative for abdominal pain, nausea and vomiting.   Endocrine: Negative.    Genitourinary: Negative.  Negative for difficulty urinating and dysuria.   Musculoskeletal: Positive for arthralgias and myalgias. Negative for joint swelling.   Skin: Negative.    Allergic/Immunologic: Negative.    Neurological: Negative.  Negative for headaches.   Psychiatric/Behavioral: Negative.      Objective:     Vital Signs (Most Recent):  Temp: 98.6 °F (37 °C) (05/23/17 0826)  Pulse: 109 (05/23/17 0826)  Resp: 18 (05/23/17 0826)  BP: 101/64 (05/23/17 0826)  SpO2: 100 % (05/23/17 0826) Vital Signs (24h Range):  Temp:  [98.6 °F (37 °C)-98.9 °F (37.2 °C)] 98.6 °F (37 °C)  Pulse:  [109-121] 109  Resp:  [18-20] 18  SpO2:  [96 %-100 %] 100 %  BP: (101-129)/(64-82) 101/64     Weight: 76.6 kg (168 lb 14 oz)  Body mass index is 28.99 kg/m².    Intake/Output Summary (Last 24 hours) at 05/23/17 1123  Last data filed at 05/23/17 0600   Gross per 24 hour   Intake              180 ml   Output                0 ml   Net              180 ml      Physical Exam   Constitutional: She is oriented to person, place, and  time. She appears well-developed and well-nourished. No distress.   HENT:   Head: Normocephalic and atraumatic.   Right Ear: External ear normal.   Left Ear: External ear normal.   Nose: Nose normal.   Mouth/Throat: Oropharynx is clear and moist.   Eyes: Conjunctivae and EOM are normal. Pupils are equal, round, and reactive to light.   Neck: Normal range of motion. Neck supple.   Cardiovascular: Normal rate, regular rhythm, normal heart sounds and intact distal pulses.    Pulmonary/Chest: Effort normal and breath sounds normal. No respiratory distress. She has no wheezes. She has no rales. She exhibits no tenderness.   Left subclavian port   Abdominal: Soft. Bowel sounds are normal. She exhibits no distension. There is no tenderness.   Musculoskeletal: Normal range of motion. She exhibits tenderness. She exhibits no edema or deformity.   Pain x4 extremities, L arm > R arm; BLE pain greatest just distal to knees   Neurological: She is alert and oriented to person, place, and time.   Skin: Skin is warm and dry. Capillary refill takes less than 2 seconds. She is not diaphoretic.   Psychiatric: She has a normal mood and affect. Her behavior is normal. Judgment and thought content normal.   Nursing note and vitals reviewed.      Significant Labs:   Blood Culture:   Recent Labs  Lab 05/23/17  0012 05/23/17  0233   LABBLOO No Growth to date  No Growth to date No Growth to date     BMP:   Recent Labs  Lab 05/23/17  0012   GLU 92      K 2.8*      CO2 24   BUN 10   CREATININE 0.6   CALCIUM 8.4*   MG 1.4*     CBC:   Recent Labs  Lab 05/23/17  0012   WBC 5.19   HGB 6.6*   HCT 19.7*   *       Significant Imaging:   Imaging Results          X-Ray Forearm Bilateral (Final result)  Result time 05/23/17 10:11:41   Procedure changed from X-Ray Forearm Left     Final result by Freddy Arredondo III, MD (05/23/17 10:11:41)                 Impression:     Bilateral forearm bone infarcts.  Infection less  likely.      Electronically signed by: FREDDY KO  Date:     05/23/17  Time:    10:11              Narrative:    2 views bilateral.    Right radius demonstrates 2 destructive lesions right ulna one.  Left radius demonstrates one destructive process.  These are most likely bone infarcts.  Osteomyelitis probably less likely.                             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  Infection less likely.      Electronically signed by: FREDDY KO  Date:     05/23/17  Time:    10:00                              X-Ray Tibia Fibula Bilateral (Final result)  Result time 05/23/17 10:01:24    Final result by Freddy Ko III, MD (05/23/17 10:01:24)                 Narrative:    2 views bilateral.    Right: There is a distal femur proximal tibia distal tibia lesion consistent with bone infarcts.    Left: There are multiple tibial bone infarcts in the distal femur bone infarct.  No pathologic fracture seen.      Electronically signed by: FREDDY KO  Date:     05/23/17  Time:    10:01                              X-Ray Femur 2 View Bilateral (Final result)  Result time 05/23/17 10:04:27    Final result by Freddy Ko III, MD (05/23/17 10:04:27)                 Narrative:    2 views bilateral.    Right: There are multiple bone infarcts suspected throughout the right femur.  Also the proximal right tibia.  No fracture dislocation bone destruction seen.    Left: There are multiple bone infarcts suspected throughout the left femur and proximal tibia.  No fracture dislocation bone destruction seen.      Electronically signed by: FREDDY KO  Date:     05/23/17  Time:    10:04                              X-Ray Forearm Right (No Result on File)

## 2017-05-23 NOTE — ASSESSMENT & PLAN NOTE
- Bcx at [facility] with preliminary result of GNR in aerobic & anaerobic bottles  - re-checking Bcx here (x2 peripheral and x1 from port)  - continue meropenem initiated at [facility]  - at this time pt does not want port removed even though it is potential source of bacteremia  - ID consult as above

## 2017-05-23 NOTE — ASSESSMENT & PLAN NOTE
- replacing  - on multiple QTc prolonging medications; checking EKG to eval QTc and monitor on telemetry

## 2017-05-23 NOTE — PROGRESS NOTES
"[facility] Hospital Medicine  Progress Note    Patient Name: Yamila Lan  MRN: [id number]  Patient Class: IP- Inpatient   Admission Date: 5/22/2017  Length of Stay: 1 days  Attending Physician: Edgardo Avila MD  Primary Care Provider: Silvana Dhaliwal MD    [facility] Medicine Team: [facility] L Moe Shields MD    Subjective:     Principal Problem:Osteonecrosis    HPI:  Patient is a 46 y.o. female with significant past medical history of HTN and sickle cell dz was transferred from [facility]. Pt had originally presented to the hospital c/o sickle cell crisis; specifically bilateral arm pain (L > R) for two weeks. Pt thought pain may have been related to a fall she sustained approximately 1 month ago.  She also endorses h/o fever, chills and body aches. While admitted the pt began having fevers and Bcx grew out GNR (initally tx with cefepime, now on meropenem). BUE xrays are showing periosteal dz concerning for possible osteomyelitis vs metastatic dz. Prior to admission pt had port in place 2/2 poor venous access.     Latest labs at OSH revealed WBC 7.1, anemia (H/H 6.3/19), retic count 4.7, mild hypokalemia (K 3.1), mild hypomagnesemia (Mag 1.5), ESR & CRP elevated (118 and 103 respectively), lactic 1.1; INR 1.4, UA neg for infection, UPT neg. Bcx preliminary results showing GNR in both aerobic and anaerobic bottles. Xray LUE  showed "osseous erosive lesions within the mid humeral diaphysis bone marrow with additional destructive lesions noted in the proximal radial diaphysis. Shoulder and elbow joints intact. Findings concerning for metastatic disease to humerus and proximal radius." Xray RUE revealed "destructive mauro lesion within mid humeral diaphyseal bone marrow. Destructive lesions additional seening involiving radius & ulna. Should and elbow joints intact." Pt also had MRI of left shoulder performed revealing changes consistent with "early osteonecrosis along superior " "and medial aspect of left humeral head."  CXRY was neg for acute cardiopulmonary process. Pt was originally supposed to be transfused 3 units PRBCs, however pt has h/o antibodies with difficulty matching blood and had a questionable reaction to one of the units. It is unclear from chart how many units were received. Per OSH records, the pt's port was d/c'd 2/2 bacteremia and PICC was placed, however it appears that this did not occur and pt's port is still in place. L shoulder arthrocentesis was performed, gram stain without organisms or WBCs.       Hospital Course:  Patient admitted to hospital medicine with infectious disease, orthopedics, and hematology/oncology consults. Continued on IV abx (sofiya), and repeat cultures and x-ray obtained.  Contacted OSH for further culture results, patient is growing serratia, their microbiology lab estimated sensitivities will be back tomorrow sometime after 8am.  Ortho recommending IR obtain biopsy and aspiration (with WBC/dif, gram stain, cx) of one of the bone lesions prior to further MRI.    Interval History: NAEON. This morning, pt reports feeling about the same. Pain in both arms and also legs. Pt reports her usual sickle crisis tends to start in the legs mostly, though this time pain started in the arms. No CP, SOB at rest, or cough. Pt does report SOB with exertion. No repeat fevers. Pt denies nausea/vomit/abd pain, but does report diarrhea for ~2 weeks. No other new complaints.    Review of Systems   Constitutional: Positive for chills, fatigue and fever. Negative for appetite change.   HENT: Negative.  Negative for sinus pressure and sore throat.    Eyes: Negative.  Negative for visual disturbance.   Respiratory: Positive for shortness of breath (with exertion). Negative for cough.    Cardiovascular: Negative.  Negative for chest pain and palpitations.   Gastrointestinal: Positive for diarrhea (for 2 weeks, non-bloody). Negative for abdominal pain, nausea and vomiting. "   Endocrine: Negative.    Genitourinary: Negative.  Negative for difficulty urinating and dysuria.   Musculoskeletal: Positive for arthralgias and myalgias. Negative for joint swelling.   Skin: Negative.    Allergic/Immunologic: Negative.    Neurological: Negative.  Negative for headaches.   Psychiatric/Behavioral: Negative.      Objective:     Vital Signs (Most Recent):  Temp: 98.6 °F (37 °C) (05/23/17 0826)  Pulse: 109 (05/23/17 0826)  Resp: 18 (05/23/17 0826)  BP: 101/64 (05/23/17 0826)  SpO2: 100 % (05/23/17 0826) Vital Signs (24h Range):  Temp:  [98.6 °F (37 °C)-98.9 °F (37.2 °C)] 98.6 °F (37 °C)  Pulse:  [109-121] 109  Resp:  [18-20] 18  SpO2:  [96 %-100 %] 100 %  BP: (101-129)/(64-82) 101/64     Weight: 76.6 kg (168 lb 14 oz)  Body mass index is 28.99 kg/m².    Intake/Output Summary (Last 24 hours) at 05/23/17 1123  Last data filed at 05/23/17 0600   Gross per 24 hour   Intake              180 ml   Output                0 ml   Net              180 ml      Physical Exam   Constitutional: She is oriented to person, place, and time. She appears well-developed and well-nourished. No distress.   HENT:   Head: Normocephalic and atraumatic.   Right Ear: External ear normal.   Left Ear: External ear normal.   Nose: Nose normal.   Mouth/Throat: Oropharynx is clear and moist.   Eyes: Conjunctivae and EOM are normal. Pupils are equal, round, and reactive to light.   Neck: Normal range of motion. Neck supple.   Cardiovascular: Normal rate, regular rhythm, normal heart sounds and intact distal pulses.    Pulmonary/Chest: Effort normal and breath sounds normal. No respiratory distress. She has no wheezes. She has no rales. She exhibits no tenderness.   Left subclavian port   Abdominal: Soft. Bowel sounds are normal. She exhibits no distension. There is no tenderness.   Musculoskeletal: Normal range of motion. She exhibits tenderness. She exhibits no edema or deformity.   Pain x4 extremities, L arm > R arm; BLE pain  greatest just distal to knees   Neurological: She is alert and oriented to person, place, and time.   Skin: Skin is warm and dry. Capillary refill takes less than 2 seconds. She is not diaphoretic.   Psychiatric: She has a normal mood and affect. Her behavior is normal. Judgment and thought content normal.   Nursing note and vitals reviewed.      Significant Labs:   Blood Culture:   Recent Labs  Lab 05/23/17  0012 05/23/17  0233   LABBLOO No Growth to date  No Growth to date No Growth to date     BMP:   Recent Labs  Lab 05/23/17  0012   GLU 92      K 2.8*      CO2 24   BUN 10   CREATININE 0.6   CALCIUM 8.4*   MG 1.4*     CBC:   Recent Labs  Lab 05/23/17  0012   WBC 5.19   HGB 6.6*   HCT 19.7*   *       Significant Imaging:   Imaging Results          X-Ray Forearm Bilateral (Final result)  Result time 05/23/17 10:11:41   Procedure changed from X-Ray Forearm Left     Final result by Freddy Ko III, MD (05/23/17 10:11:41)                 Impression:     Bilateral forearm bone infarcts.  Infection less likely.      Electronically signed by: FREDDY KO  Date:     05/23/17  Time:    10:11              Narrative:    2 views bilateral.    Right radius demonstrates 2 destructive lesions right ulna one.  Left radius demonstrates one destructive process.  These are most likely bone infarcts.  Osteomyelitis probably less likely.                             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  Infection less likely.      Electronically signed by: FREDDY KO  Date:     05/23/17  Time:    10:00                              X-Ray Tibia Fibula Bilateral (Final result)  Result time 05/23/17 10:01:24    Final result by Freddy Ko III, MD (05/23/17 10:01:24)                  Narrative:    2 views bilateral.    Right: There is a distal femur proximal tibia distal tibia lesion consistent with bone infarcts.    Left: There are multiple tibial bone infarcts in the distal femur bone infarct.  No pathologic fracture seen.      Electronically signed by: FREDDY KO  Date:     05/23/17  Time:    10:01                              X-Ray Femur 2 View Bilateral (Final result)  Result time 05/23/17 10:04:27    Final result by Freddy Ko III, MD (05/23/17 10:04:27)                 Narrative:    2 views bilateral.    Right: There are multiple bone infarcts suspected throughout the right femur.  Also the proximal right tibia.  No fracture dislocation bone destruction seen.    Left: There are multiple bone infarcts suspected throughout the left femur and proximal tibia.  No fracture dislocation bone destruction seen.      Electronically signed by: FREDDY KO  Date:     05/23/17  Time:    10:04                              X-Ray Forearm Right (No Result on File)                   Assessment/Plan:      Hypophosphatemia    - replacing          Hypomagnesemia    - replacing   - repeat lytes pending this afternoon, may need additional replacement          Hypokalemia    - replacing   - repeat lytes pending this afternoon, may need additional replacement        Gram-negative bacteremia    - Bcx at [facility] with preliminary result of GNR in aerobic & anaerobic bottles    - called them today, speciation will be serratia. Sensitivities tomorrow after 8am, will call back then.  - repeat blood cx here pending (x2 peripheral and x1 from port)  - continue meropenem initiated at [facility] for now   - potential sources include bone/osteo, s/p x-ray, ortho consult. Recommending IR obtain biopsy/aspiration of one of her bone lesions to evaluate for infection  - at this time pt does not want port removed even though it is potential source of bacteremia  - ID consulted, appreciate their recs          Sickle  cell crisis    - H/H 6.3/18 at OSH, no improvement with transfusion 1 unit PRBCs   - with hx sickle cell disease, lower transfusion threshhold    - pt does not appear to have acute chest at this time   - continue to monitor hemoglobin   - pain control, oxygen, IVF   - hem/onc also consulted, appreciate their recs          * Osteonecrosis    - BUE pain with evidence of osteonecrosis on xrays concerning for osteomyelitis vs malignancy, vs osteonecrosis 2/2 sickle cell   - obtaining x-rays, ortho consulted    - as above, rec for IR biopsy/aspiration of lesion as next step    - continue to monitor, f/u   - NPO for possible procedures, will f/u with consultants and likely order diet soon          VTE Risk Mitigation     None          Moe Shields MD  Department of Hospital Medicine   [facility]

## 2017-05-23 NOTE — PROGRESS NOTES
Pt arrived to MRI on continuous tele & portable continuous bp monitor, monitor removed for MRI, tele room notified, portable tele box will be placed on pt post MRI, HR 86

## 2017-05-23 NOTE — MEDICAL/APP STUDENT
[facility]  Infectious Disease  Consult Note    Patient Name: Yamila Lan  MRN: [id number]  Admission Date: 5/22/2017  Hospital Length of Stay: 1 days  Attending Physician: Edgardo Avila MD  Primary Care Provider: Silvana Dhaliwal MD     Isolation Status: No active isolations    Patient information was obtained from patient, past medical records and ER records.      Consults     Assessment/Plan:     Active Diagnoses:    Diagnosis Date Noted POA    PRINCIPAL PROBLEM:  Osteonecrosis [M87.9] 05/23/2017 Yes    Gram-negative bacteremia [R78.81] 05/23/2017 Yes    Hypokalemia [E87.6] 05/23/2017 Yes    Hypomagnesemia [E83.42] 05/23/2017 Yes    Hypophosphatemia [E83.39] 05/23/2017 Yes    Sickle cell crisis [D57.00] 05/22/2017 Yes      Problems Resolved During this Admission:    Diagnosis Date Noted Date Resolved POA     Consult reason: GNR Bacteremia w/ possible osteomyelitis     ## Bacteremia  -OSH cx: GNR  -5/22 blood cx: NGTD, port cx: ngtd        ## Possible Osteomyelitis  - Imaging of BLE consistent w/ bone infarction likely 2/2 SSC vs infection (osteomyeltis)  - Imaging of LUE concerning for unknown bone lesion; awaiting ortho reccs      Thank you for your consult. Team will continue to follow.    Ioana Vallejo  Infectious Disease  [facility]    Subjective:     Principal Problem: Osteonecrosis    HPI:   47 yo F w/ PMH of HTN and SCA who presented to OSH ([facility]) with sickle cell crisis pain in legs (pain developing over several months) and arms (pain developing over past 2 months, L>R). Pt did have a fall a month ago as well.     Pt had fevers during admit and blood cx revealed GNR. She was originally started on cefepime for bacteremia, but is now on meropenem.Pt endorses fevers, chills, night sweats at home for past 3 weeks, though not unusual she says for her during a crisis. Also endorses 15lb unintentional  wt loss over past 4 months.         Past  Medical History:   Diagnosis Date    Hypertension     Sickle cell anemia        Past Surgical History:   Procedure Laterality Date     SECTION      LEG SURGERY      PORTACATH PLACEMENT      portacath removal      TUBAL LIGATION         Review of patient's allergies indicates:   Allergen Reactions    Floxin [ofloxacin]     Folic acid containing drugs     Toradol [ketorolac]        Medications:  Prescriptions Prior to Admission   Medication Sig    busPIRone (BUSPAR) 15 MG tablet Take 15 mg by mouth 3 (three) times daily. 3-4 x/day    citalopram (CELEXA) 20 MG tablet Take 20 mg by mouth once daily.    clonazePAM (KLONOPIN) 1 MG tablet Take 1 mg by mouth 2 (two) times daily.    lubiprostone (AMITIZA) 24 MCG Cap Take 24 mcg by mouth 2 (two) times daily with meals.    morphine (MSIR) 30 MG tablet Take 30 mg by mouth 2 (two) times daily as needed for Pain.    oxycodone (ROXICODONE) 5 MG immediate release tablet Take 5 mg by mouth every 4 (four) hours as needed for Pain.    potassium chloride SA (K-DUR,KLOR-CON) 10 MEQ tablet Take 10 mEq by mouth 2 (two) times daily.    ziprasidone (GEODON) 40 MG Cap Take 40 mg by mouth 2 (two) times daily with meals.    carvedilol (COREG) 6.25 MG tablet Take 6.25 mg by mouth 2 (two) times daily with meals.    cyclobenzaprine (FLEXERIL) 5 MG tablet Take 10 mg by mouth once daily.     zolpidem (AMBIEN) 5 MG Tab Take 5 mg by mouth nightly as needed.     Antibiotics     Start     Stop Route Frequency Ordered    17 0200  meropenem-0.9% sodium chloride 1 g/50 mL IVPB      -- IV Every 8 hours (non-standard times) 17 0145        Antifungals     None        Antivirals     None             There is no immunization history on file for this patient.    Family History     None        Social History     Social History    Marital status: Single     Spouse name: N/A    Number of children: N/A    Years of education: N/A     Social History Main Topics    Smoking  status: Never Smoker    Smokeless tobacco: None    Alcohol use No    Drug use: No    Sexual activity: Not Currently     Other Topics Concern    None     Social History Narrative    None       Review of Systems   Constitutional: Positive for appetite change, chills, fever and unexpected weight change.        3 weeks prior to OSH presentation had fever, chills, night sweats  15lb unintentional wt loss over last 4 months  Loss of appetite over last 3 months   HENT: Negative for congestion, rhinorrhea and sneezing.    Respiratory: Negative for cough, chest tightness and shortness of breath.    Cardiovascular: Negative for chest pain, palpitations and leg swelling.   Gastrointestinal: Positive for diarrhea. Negative for abdominal distention, abdominal pain and vomiting.        Diarrhea began at OSH s/p abx initiation   Genitourinary: Negative for dysuria.   Musculoskeletal:        BLUE and BLLE +pain with any ROM   Skin: Negative for rash and wound.        Objective:     Vital Signs (Most Recent):  Temp: 98.6 °F (37 °C) (05/23/17 0826)  Pulse: 92 (05/23/17 1100)  Resp: 18 (05/23/17 0826)  BP: 101/64 (05/23/17 0826)  SpO2: 100 % (05/23/17 0826) Vital Signs (24h Range):  Temp:  [98.6 °F (37 °C)-98.9 °F (37.2 °C)] 98.6 °F (37 °C)  Pulse:  [] 92  Resp:  [18-20] 18  SpO2:  [96 %-100 %] 100 %  BP: (101-129)/(64-82) 101/64     Weight: 76.6 kg (168 lb 14 oz)  Body mass index is 28.99 kg/m².    Estimated Creatinine Clearance: 119.9 mL/min (based on Cr of 0.6).    Physical Exam   Constitutional: She appears lethargic. She is cooperative. She is easily aroused.   HENT:   Head: Normocephalic and atraumatic.   Eyes: Pupils are equal, round, and reactive to light.   Cardiovascular: Regular rhythm and normal heart sounds.  Tachycardia present.    Pulmonary/Chest: Effort normal and breath sounds normal. No respiratory distress. She has no wheezes. She exhibits no tenderness.   Port in place   Abdominal: Soft. Bowel sounds  are normal. She exhibits no distension and no mass. There is no tenderness. There is no guarding.   Musculoskeletal: She exhibits tenderness.   Pain BLUE and BLLE   Neurological: She is easily aroused. She appears lethargic.       Significant Labs:   Blood Culture:   Recent Labs  Lab 05/23/17 0012 05/23/17  0233   LABBLOO No Growth to date  No Growth to date No Growth to date     CBC:   Recent Labs  Lab 05/23/17 0012   WBC 5.19   HGB 6.6*   HCT 19.7*   *     CMP:   Recent Labs  Lab 05/23/17 0012      K 2.8*      CO2 24   GLU 92   BUN 10   CREATININE 0.6   CALCIUM 8.4*   PROT 7.4   ALBUMIN 2.0*   BILITOT 1.3*   ALKPHOS 154*   AST 39   ALT 23   ANIONGAP 8   EGFRNONAA >60.0     Microbiology Results (last 7 days)     Procedure Component Value Units Date/Time    Clostridium difficile EIA [[id number]] Collected:  05/23/17 0310    Order Status:  Completed Specimen:  Stool from Stool Updated:  05/23/17 1045     C. diff Antigen Negative     C difficile Toxins A+B, EIA Negative     Comment: Testing not recommended for children <24 months old.       Urine culture [[id number]]     Order Status:  No result Specimen:  Urine     Blood culture [[id number]] Collected:  05/23/17 0012    Order Status:  Completed Specimen:  Blood Updated:  05/23/17 0945     Blood Culture, Routine No Growth to date    Narrative:       Collection has been rescheduled by RNS at 5/22/2017 22:49 Reason: no   armband.  Collection has been rescheduled by RNS at 5/22/2017 22:56 Reason: pt.   in restroom.  Collection has been rescheduled by RNS at 5/22/2017 23:16 Reason: no   armband\ Lew notified they are still waiting to print it out.  Collection has been rescheduled by RNS at 5/22/2017 22:49 Reason: no   armband.  Collection has been rescheduled by RNS at 5/22/2017 22:56 Reason: pt.   in restroom.  Collection has been rescheduled by RNS at 5/22/2017 23:16 Reason: no   armband\ Lew notified they are still waiting to print it  out.    Blood culture [[id number]] Collected:  05/23/17 0233    Order Status:  Completed Specimen:  Blood from Line, Port A Cath Updated:  05/23/17 0945     Blood Culture, Routine No Growth to date    Narrative:       From port    Blood culture [[id number]] Collected:  05/23/17 0012    Order Status:  Completed Specimen:  Blood Updated:  05/23/17 0945     Blood Culture, Routine No Growth to date    Narrative:       Collection has been rescheduled by RNS at 5/22/2017 22:49 Reason: no   armband.  Collection has been rescheduled by RNS at 5/22/2017 22:56 Reason: pt.   in restroom.  Collection has been rescheduled by RNS at 5/22/2017 23:16 Reason: no   armband\ Lew notified they are still waiting to print it out.  Collection has been rescheduled by RNS at 5/22/2017 22:49 Reason: no   armband.  Collection has been rescheduled by RNS at 5/22/2017 22:56 Reason: pt.   in restroom.  Collection has been rescheduled by RNS at 5/22/2017 23:16 Reason: no   armband\ Lew notified they are still waiting to print it out.        Respiratory Culture: No results for input(s): GSRESP, RESPIRATORYC in the last 4320 hours.  Urine Culture: No results for input(s): LABURIN in the last 4320 hours.  Urine Studies: No results for input(s): COLORU, APPEARANCEUA, PHUR, SPECGRAV, PROTEINUA, GLUCUA, KETONESU, BILIRUBINUA, OCCULTUA, NITRITE, UROBILINOGEN, LEUKOCYTESUR, RBCUA, WBCUA, BACTERIA, SQUAMEPITHEL, HYALINECASTS in the last 4320 hours.    Invalid input(s): WRIGHTSUR    Significant Imaging:   Imaging Results          X-Ray Chest 1 View (In process)                X-Ray Forearm Bilateral (Final result)  Result time 05/23/17 10:11:41   Procedure changed from X-Ray Forearm Left     Final result by Freddy Ko III, MD (05/23/17 10:11:41)                 Impression:     Bilateral forearm bone infarcts.  Infection less likely.      Electronically signed by: FREDDY KO  Date:     05/23/17  Time:    10:11              Narrative:     2 views bilateral.    Right radius demonstrates 2 destructive lesions right ulna one.  Left radius demonstrates one destructive process.  These are most likely bone infarcts.  Osteomyelitis probably less likely.                             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  Infection less likely.      Electronically signed by: FREDDY KO  Date:     05/23/17  Time:    10:00                              X-Ray Tibia Fibula Bilateral (Final result)  Result time 05/23/17 10:01:24    Final result by Freddy Ko III, MD (05/23/17 10:01:24)                 Narrative:    2 views bilateral.    Right: There is a distal femur proximal tibia distal tibia lesion consistent with bone infarcts.    Left: There are multiple tibial bone infarcts in the distal femur bone infarct.  No pathologic fracture seen.      Electronically signed by: FREDDY KO  Date:     05/23/17  Time:    10:01                              X-Ray Femur 2 View Bilateral (Final result)  Result time 05/23/17 10:04:27    Final result by Freddy Ko III, MD (05/23/17 10:04:27)                 Narrative:    2 views bilateral.    Right: There are multiple bone infarcts suspected throughout the right femur.  Also the proximal right tibia.  No fracture dislocation bone destruction seen.    Left: There are multiple bone infarcts suspected throughout the left femur and proximal tibia.  No fracture dislocation bone destruction seen.      Electronically signed by: FREDDY KO  Date:     05/23/17  Time:    10:04                              X-Ray Forearm Right (No Result on File)

## 2017-05-23 NOTE — CONSULTS
Radiology Consult    Yamila Lan is a 46 y.o. female with a history of bone lesions.  Past Medical History:   Diagnosis Date    Hypertension     Sickle cell anemia      Past Surgical History:   Procedure Laterality Date     SECTION      LEG SURGERY      PORTACATH PLACEMENT      portacath removal      TUBAL LIGATION         Discussed with primary team, Dr. Avila.    Imaging reviewed with Radiology staff, Dr. Lyle.     Procedure: biopsy    Scheduled Meds:    busPIRone  15 mg Oral TID    carvedilol  6.25 mg Oral BID WM    citalopram  20 mg Oral Daily    clonazePAM  1 mg Oral BID    lubiprostone  24 mcg Oral BID WM    meropenem-0.9% sodium chloride  1 g Intravenous Q8H    morphine  30 mg Oral Q12H    senna-docusate 8.6-50 mg  1 tablet Oral BID    ziprasidone  40 mg Oral BID WM     Continuous Infusions:    sodium chloride 0.9%       PRN Meds:sodium chloride, acetaminophen, cyclobenzaprine, HYDROmorphone, ondansetron, ondansetron, oxycodone, oxycodone, zolpidem    Allergies:   Review of patient's allergies indicates:   Allergen Reactions    Floxin [ofloxacin]     Folic acid containing drugs     Toradol [ketorolac]        Labs:    Recent Labs  Lab 17  0234   INR 1.3*       Recent Labs  Lab 17  0012   WBC 5.19   HGB 6.6*   HCT 19.7*   MCV 73*   *      Recent Labs  Lab 17  0012   GLU 92      K 2.8*      CO2 24   BUN 10   CREATININE 0.6   CALCIUM 8.4*   MG 1.4*   ALT 23   AST 39   ALBUMIN 2.0*   BILITOT 1.3*         Vitals (Most Recent):  Temp: 98.6 °F (37 °C) (17 0826)  Pulse: 87 (17 1230)  Resp: 17 (17 1230)  BP: 95/60 (17 1230)  SpO2: 100 % (17 1230)    Plan:   1. Patient is getting an MRI of the arm.  2. Reassessment will be made once MRI is complete  3. Please reconsult if biopsy needed

## 2017-05-23 NOTE — HPI
Ms. Lan is a 46 year old female with history of sickle cell disease who presented to [facility] complaining of bilateral arm pain L>R that had been persistent for about 4 weeks. She states that she experienced a fall and extended both arms out to catch her fall. She endorses a 15 pound unintentional weight loss since the beginning of the year but feels this is secondary to being hospitalized so much more frequently since January. Patient denied fevers chills, night sweats.      At the OSH, she developed fever after receiving a blood transfusion. Blood cultures drawn 5/21 grew gram negative rods consistent with serratia. BLUE X rays were concerning for erosive bone process with osteonecrosis vs metastatic dz per outside report. Patient has had a port in place for five years. She denies tenderness or erythema at this site. She is hesitant to have port removed. Has had diarrhea since starting abx for positive blood cultures and is C. Diff negative. She denies every having bacteremia or serious infectious in the past.

## 2017-05-23 NOTE — ASSESSMENT & PLAN NOTE
- H/H 6.3/18 at OSH, no improvement with transfusion 1 unit PRBCs   - with hx sickle cell disease, lower transfusion threshhold    - pt does not appear to have acute chest at this time   - continue to monitor hemoglobin   - pain control, oxygen, IVF   - hem/onc also consulted, appreciate their recs

## 2017-05-23 NOTE — ASSESSMENT & PLAN NOTE
- BUE pain with evidence of osteonecrosis on xrays concerning for osteomyelitis vs malignancy  - obtaining MRI BUE with & without contrast  - new c/o BLE pain - obtaining xrays BLE, if evidence of osteonecrosis on xray, will need MRI of BLE as well  - Ortho, Heme/onc & ID consulted, awaiting eval & recommendations  - NPO for possible bone marrow biopsy in am

## 2017-05-23 NOTE — ASSESSMENT & PLAN NOTE
- BUE pain with evidence of osteonecrosis on xrays concerning for osteomyelitis vs malignancy, vs osteonecrosis 2/2 sickle cell   - obtaining x-rays, ortho consulted    - as above, rec for IR biopsy/aspiration of lesion as next step    - continue to monitor, f/u   - NPO for possible procedures, will f/u with consultants and likely order diet soon

## 2017-05-23 NOTE — SUBJECTIVE & OBJECTIVE
Past Medical History:   Diagnosis Date    Hypertension     Sickle cell anemia        Past Surgical History:   Procedure Laterality Date     SECTION      LEG SURGERY      PORTACATH PLACEMENT      portacath removal      TUBAL LIGATION         Review of patient's allergies indicates:   Allergen Reactions    Floxin [ofloxacin]     Folic acid containing drugs     Toradol [ketorolac]        No current facility-administered medications on file prior to encounter.      Current Outpatient Prescriptions on File Prior to Encounter   Medication Sig    carvedilol (COREG) 6.25 MG tablet Take 6.25 mg by mouth 2 (two) times daily with meals.    cyclobenzaprine (FLEXERIL) 5 MG tablet Take 10 mg by mouth once daily.     zolpidem (AMBIEN) 5 MG Tab Take 5 mg by mouth nightly as needed.    [DISCONTINUED] oxycodone (OXYCONTIN) 10 MG 12 hr tablet Take 10 mg by mouth every 12 (twelve) hours.     Family History     None        Social History Main Topics    Smoking status: Never Smoker    Smokeless tobacco: Not on file    Alcohol use No    Drug use: No    Sexual activity: Not Currently     Review of Systems   Constitutional: Positive for chills and fever. Negative for appetite change.   HENT: Negative.    Eyes: Negative.    Respiratory: Negative.    Cardiovascular: Negative.    Gastrointestinal: Negative.    Endocrine: Negative.    Genitourinary: Negative.    Musculoskeletal: Positive for arthralgias and myalgias. Negative for joint swelling.   Skin: Negative.    Allergic/Immunologic: Negative.    Neurological: Negative.    Psychiatric/Behavioral: Negative.      Objective:     Vital Signs (Most Recent):  Temp: 98.9 °F (37.2 °C) (17)  Pulse: (!) 113 (17)  Resp: 20 (17)  BP: 117/75 (17)  SpO2: 96 % (17) Vital Signs (24h Range):  Temp:  [98.9 °F (37.2 °C)] 98.9 °F (37.2 °C)  Pulse:  [113] 113  Resp:  [20] 20  SpO2:  [96 %] 96 %  BP: (117)/(75) 117/75     Weight:  76.6 kg (168 lb 14 oz)  Body mass index is 28.99 kg/m².    Physical Exam   Constitutional: She is oriented to person, place, and time. She appears well-developed and well-nourished. No distress.   HENT:   Head: Normocephalic and atraumatic.   Right Ear: External ear normal.   Left Ear: External ear normal.   Nose: Nose normal.   Mouth/Throat: Oropharynx is clear and moist.   Eyes: Conjunctivae and EOM are normal. Pupils are equal, round, and reactive to light.   Neck: Normal range of motion. Neck supple.   Cardiovascular: Normal rate, regular rhythm, normal heart sounds and intact distal pulses.    Pulmonary/Chest: Effort normal and breath sounds normal. No respiratory distress. She has no wheezes. She has no rales. She exhibits no tenderness.   Left subclavian port   Abdominal: Soft. Bowel sounds are normal. She exhibits no distension. There is no tenderness.   Musculoskeletal: Normal range of motion. She exhibits tenderness. She exhibits no edema or deformity.   Pain x4 extremities, L arm > R arm; BLE pain greatest just distal to knees   Neurological: She is alert and oriented to person, place, and time.   Skin: Skin is warm and dry. Capillary refill takes less than 2 seconds. She is not diaphoretic.   Psychiatric: She has a normal mood and affect. Her behavior is normal. Judgment and thought content normal.   Nursing note and vitals reviewed.       Significant Labs:   Recent Lab Results       05/23/17  0012      Albumin 2.0(L)     Alkaline Phosphatase 154(H)     ALT 23     Anion Gap 8     AST 39     Total Bilirubin 1.3  Comment:  For infants and newborns, interpretation of results should be based  on gestational age, weight and in agreement with clinical  observations.  Premature Infant recommended reference ranges:  Up to 24 hours.............<8.0 mg/dL  Up to 48 hours............<12.0 mg/dL  3-5 days..................<15.0 mg/dL  6-29 days.................<15.0 mg/dL  (H)     BUN, Bld 10     Calcium 8.4(L)      Chloride 104     CO2 24     Creatinine 0.6     eGFR if African American >60.0     eGFR if non  >60.0  Comment:  Calculation used to obtain the estimated glomerular filtration  rate (eGFR) is the CKD-EPI equation. Since race is unknown   in our information system, the eGFR values for   -American and Non--American patients are given   for each creatinine result.       Glucose 92     Group & Rh O POS     INDIRECT HAWK POS     Magnesium 1.4(L)     Phosphorus 1.3(L)     Potassium 2.8(L)     Total Protein 7.4     Retic 2.4     Sodium 136         All pertinent labs within the past 24 hours have been reviewed.    Significant Imaging: OSH radiology reads reviewed

## 2017-05-23 NOTE — CONSULTS
[facility]  Infectious Disease  Consult Note    Patient Name: Yamila Lan  MRN: [id number]  Admission Date: 5/22/2017  Hospital Length of Stay: 1 days  Attending Physician: Edgardo Avila MD  Primary Care Provider: Silvana Dhaliwal MD     Isolation Status: No active isolations    Patient information was obtained from patient and past medical records.      Inpatient consult to Infectious Diseases  Consult performed by: ASHLEIGH HECK  Consult ordered by: MARTY OLSON  Reason for consult: GNR bacteremia        Assessment/Plan:     Gram-negative bacteremia    - preliminary blood cx from [facility] growing Serratia with sensitivities likely to result by 5/24  - will d/c meropenem and start cefepime 2g q 12 hours  - if blood cultures drawn here remain negative, patient can keep port that's currently in place. Otherwise, if positive, port will need to be removed which was discussed with the patient  - will follow up cultures here  - will need primary team to obtain outside cultures             Thank you for your consult. I will follow-up with patient. Please contact us if you have any additional questions.    Ashleigh Heck MD  Infectious Disease  [facility]    Subjective:     Principal Problem: Osteonecrosis    HPI: Ms. Lan is a 46 year old female with history of sickle cell disease who presented to [facility] complaining of bilateral arm pain L>R that had been persistent for about 4 weeks. She states that she experienced a fall and extended both arms out to catch her fall. She endorses a 15 pound unintentional weight loss since the beginning of the year but feels this is secondary to being hospitalized so much more frequently since January. Patient denied fevers chills, night sweats.      At the OSH, she developed fever after receiving a blood transfusion. Blood cultures drawn 5/21 grew gram negative rods consistent with serratia. BLUE X rays were concerning for  erosive bone process with osteonecrosis vs metastatic dz per outside report. Patient has had a port in place for five years. She denies tenderness or erythema at this site. She is hesitant to have port removed. Has had diarrhea since starting abx for positive blood cultures and is C. Diff negative. She denies every having bacteremia or serious infectious in the past.     Past Medical History:   Diagnosis Date    Hypertension     Sickle cell anemia        Past Surgical History:   Procedure Laterality Date     SECTION      LEG SURGERY      PORTACATH PLACEMENT      portacath removal      TUBAL LIGATION         Review of patient's allergies indicates:   Allergen Reactions    Floxin [ofloxacin]     Folic acid containing drugs     Toradol [ketorolac]        Medications:  Prescriptions Prior to Admission   Medication Sig    busPIRone (BUSPAR) 15 MG tablet Take 15 mg by mouth 3 (three) times daily. 3-4 x/day    citalopram (CELEXA) 20 MG tablet Take 20 mg by mouth once daily.    clonazePAM (KLONOPIN) 1 MG tablet Take 1 mg by mouth 2 (two) times daily.    lubiprostone (AMITIZA) 24 MCG Cap Take 24 mcg by mouth 2 (two) times daily with meals.    morphine (MSIR) 30 MG tablet Take 30 mg by mouth 2 (two) times daily as needed for Pain.    oxycodone (ROXICODONE) 5 MG immediate release tablet Take 5 mg by mouth every 4 (four) hours as needed for Pain.    potassium chloride SA (K-DUR,KLOR-CON) 10 MEQ tablet Take 10 mEq by mouth 2 (two) times daily.    ziprasidone (GEODON) 40 MG Cap Take 40 mg by mouth 2 (two) times daily with meals.    carvedilol (COREG) 6.25 MG tablet Take 6.25 mg by mouth 2 (two) times daily with meals.    cyclobenzaprine (FLEXERIL) 5 MG tablet Take 10 mg by mouth once daily.     zolpidem (AMBIEN) 5 MG Tab Take 5 mg by mouth nightly as needed.     Antibiotics     Start     Stop Route Frequency Ordered    17 1500  ceFEPIme in dextrose 5% 2 gram/50 mL IVPB 2 g      -- IV Every 12  hours (non-standard times) 05/23/17 1353        Antifungals     None        Antivirals     None             There is no immunization history on file for this patient.    Family History     None        Social History     Social History    Marital status: Single     Spouse name: N/A    Number of children: N/A    Years of education: N/A     Social History Main Topics    Smoking status: Never Smoker    Smokeless tobacco: None    Alcohol use No    Drug use: No    Sexual activity: Not Currently     Other Topics Concern    None     Social History Narrative    None     Review of Systems   Constitutional: Positive for appetite change, fatigue and unexpected weight change. Negative for chills and fever.   HENT: Negative for congestion.    Eyes: Negative for visual disturbance.   Respiratory: Negative for cough and shortness of breath.    Cardiovascular: Negative for chest pain.   Gastrointestinal: Positive for diarrhea. Negative for abdominal pain and nausea.   Genitourinary: Negative for dysuria.   Musculoskeletal: Positive for arthralgias.   Skin: Negative for rash.   Neurological: Negative for headaches.   Psychiatric/Behavioral: Negative for dysphoric mood.     Objective:     Vital Signs (Most Recent):  Temp: 98.6 °F (37 °C) (05/23/17 0826)  Pulse: 87 (05/23/17 1230)  Resp: 17 (05/23/17 1230)  BP: 95/60 (05/23/17 1230)  SpO2: 100 % (05/23/17 1230) Vital Signs (24h Range):  Temp:  [98.6 °F (37 °C)-98.9 °F (37.2 °C)] 98.6 °F (37 °C)  Pulse:  [] 87  Resp:  [17-20] 17  SpO2:  [96 %-100 %] 100 %  BP: ()/(60-82) 95/60     Weight: 76.6 kg (168 lb 14 oz)  Body mass index is 28.99 kg/m².    Estimated Creatinine Clearance: 119.9 mL/min (based on Cr of 0.6).    Physical Exam   Constitutional: She is oriented to person, place, and time. She appears well-developed and well-nourished. No distress.   HENT:   Head: Normocephalic and atraumatic.   Eyes: EOM are normal. Pupils are equal, round, and reactive to light.    Neck: Normal range of motion. Neck supple.   Cardiovascular: Normal rate, regular rhythm, normal heart sounds and intact distal pulses.    Pulmonary/Chest:   Left subclavian port without surrounding tenderness, erythema or edema   Abdominal: Soft. Bowel sounds are normal. She exhibits no distension. There is no tenderness.   Musculoskeletal: Normal range of motion. She exhibits tenderness. She exhibits no edema or deformity.   Pain x4 extremities, L arm > R arm; BLE pain greatest just distal to knees   Neurological: She is alert and oriented to person, place, and time.   Skin: Skin is warm. Capillary refill takes less than 2 seconds.   Psychiatric: She has a normal mood and affect. Her behavior is normal. Judgment and thought content normal.   Nursing note and vitals reviewed.      Significant Labs:   Microbiology Results (last 7 days)     Procedure Component Value Units Date/Time    Clostridium difficile EIA [[id number]] Collected:  05/23/17 0310    Order Status:  Completed Specimen:  Stool from Stool Updated:  05/23/17 1045     C. diff Antigen Negative     C difficile Toxins A+B, EIA Negative     Comment: Testing not recommended for children <24 months old.       Urine culture [[id number]]     Order Status:  No result Specimen:  Urine     Blood culture [[id number]] Collected:  05/23/17 0012    Order Status:  Completed Specimen:  Blood Updated:  05/23/17 0945     Blood Culture, Routine No Growth to date    Narrative:       Collection has been rescheduled by RNS at 5/22/2017 22:49 Reason: no   armband.  Collection has been rescheduled by RNS at 5/22/2017 22:56 Reason: pt.   in restroom.  Collection has been rescheduled by RNS at 5/22/2017 23:16 Reason: no   armband\ Lew notified they are still waiting to print it out.  Collection has been rescheduled by RNS at 5/22/2017 22:49 Reason: no   armband.  Collection has been rescheduled by RNS at 5/22/2017 22:56 Reason: pt.   in restroom.  Collection has been  rescheduled by RNS at 5/22/2017 23:16 Reason: no   armband\ Lew notified they are still waiting to print it out.    Blood culture [[id number]] Collected:  05/23/17 0233    Order Status:  Completed Specimen:  Blood from Line, Port A Cath Updated:  05/23/17 0945     Blood Culture, Routine No Growth to date    Narrative:       From port    Blood culture [[id number]] Collected:  05/23/17 0012    Order Status:  Completed Specimen:  Blood Updated:  05/23/17 0945     Blood Culture, Routine No Growth to date    Narrative:       Collection has been rescheduled by RNS at 5/22/2017 22:49 Reason: no   armband.  Collection has been rescheduled by RNS at 5/22/2017 22:56 Reason: pt.   in restroom.  Collection has been rescheduled by RNS at 5/22/2017 23:16 Reason: no   armband\ Lew notified they are still waiting to print it out.  Collection has been rescheduled by RNS at 5/22/2017 22:49 Reason: no   armband.  Collection has been rescheduled by RNS at 5/22/2017 22:56 Reason: pt.   in restroom.  Collection has been rescheduled by RNS at 5/22/2017 23:16 Reason: no   armband\ Lew notified they are still waiting to print it out.          Significant Imaging: I have reviewed all pertinent imaging results/findings within the past 24 hours.

## 2017-05-23 NOTE — CONSULTS
Consult Note  Orthopaedics    SUBJECTIVE:     History of Present Illness:  Patient is a 46 y.o. female with PMH of sickle cell disease and HTN presents admitted to medicine as transfer from [facility].  She originally presented in sickle cell pain crisis and then began having BL arm pain.  She states she has had BL arm pain for ~1 month and BL LE pain for ~1 year.  At OSH she began spiking fevers and blood cs grew Gram negative rods (on meropenem).  Imaging at OSH was concerning for lytic vs reactive bony process and [place] was [place] [facility].  ON presentation she had WBC 7.1, H/H 6.3/19, , , procalcitonin 118, and negative UA.    Upon further questioning, she endorses 15lb weight loss over past 4 months.  Has had fevers off and on over this time, but non recently.      At OSH, left shoulder aspiration was performed for shoulder pain with negative gram stain or cx (per primary team).    Scheduled Meds:   busPIRone  15 mg Oral TID    carvedilol  6.25 mg Oral BID WM    citalopram  20 mg Oral Daily    clonazePAM  1 mg Oral BID    lubiprostone  24 mcg Oral BID WM    meropenem-0.9% sodium chloride  1 g Intravenous Q8H    morphine  30 mg Oral Q12H    senna-docusate 8.6-50 mg  1 tablet Oral BID    ziprasidone  40 mg Oral BID WM     Continuous Infusions:   sodium chloride 0.9%       PRN Meds:sodium chloride, acetaminophen, cyclobenzaprine, HYDROmorphone, ondansetron, ondansetron, oxycodone, oxycodone, zolpidem    Review of patient's allergies indicates:   Allergen Reactions    Floxin [ofloxacin]     Folic acid containing drugs     Toradol [ketorolac]        Past Medical History:   Diagnosis Date    Hypertension     Sickle cell anemia      Past Surgical History:   Procedure Laterality Date     SECTION      LEG SURGERY      PORTACATH PLACEMENT      portacath removal      TUBAL LIGATION       History reviewed. No pertinent family history.  Social History    Substance Use Topics    Smoking status: Never Smoker    Smokeless tobacco: Not on file    Alcohol use No        Review of Systems:  Patient denies constitutional symptoms, cardiac symptoms, respiratory symptoms, GI symptoms.  The remainder of the musculoskeletal ROS is included in the HPI.      OBJECTIVE:     Vital Signs (Most Recent)  Temp: 98.6 °F (37 °C) (05/23/17 0826)  Pulse: 92 (05/23/17 1100)  Resp: 18 (05/23/17 0826)  BP: 101/64 (05/23/17 0826)  SpO2: 100 % (05/23/17 0826)    Physical Exam:  Gen:  No acute distress  CV:  Peripherally well-perfused.  Pulses 2+ bilaterally.  Lungs:  Normal respiratory effort.  Head/Neck:  Normocephalic.  Atraumatic.  Neuro:  CN intact without deficit, SILT throughout B/L Upper & Lower Extremities    MSK:  RUE:  - no gross deformity  - no erythema or warmth  - no effusion nathanael wrist/elbow/shoulder  - ttp to mid forearm and mid upper arm  - no pain with PROM of extremity  - AROM limited 2/2 fatigue and forearm pain  - AIN/PIN/Radial/Median/Ulnar Nerves assessed in isolation without deficit  - SILT throughout  - Radial & Ulnar arteries palpated 2+  - Capillary Refill <3s    LUE:  - no gross deformity  - no erythema or warmth  - no effusion nathanael wrist/elbow/shoulder  - ttp to mid forearm and mid upper arm, most prominent at forearm  - no pain with PROM of extremity  - AROM limited 2/2 fatigue and forearm pain  - AIN/PIN/Radial/Median/Ulnar Nerves assessed in isolation without deficit  - SILT throughout  - Radial & Ulnar arteries palpated 2+  - Capillary Refill <3s    RLE:  - no erythema or warmth  - very mild ttp to distal femur and mid tibia  - full ROM all joints without pain  - TA/EHL/Gastroc/FHL assessed in isolation without deficit  - SILT throughout  - DP and PT palpated  2+  - Capillary Refill <3s    LLE:  - no erythema or warmth  - very mild ttp to distal femur and mid tibia  - full ROM all joints without pain  - TA/EHL/Gastroc/FHL assessed in isolation without deficit  -  SILT throughout  - DP and PT palpated  2+  - Capillary Refill <3s    Laboratory:  Recent Results (from the past 72 hour(s))   CBC auto differential    Collection Time: 05/23/17 12:12 AM   Result Value Ref Range    WBC 5.19 3.90 - 12.70 K/uL    RBC 2.71 (L) 4.00 - 5.40 M/uL    Hemoglobin 6.6 (L) 12.0 - 16.0 g/dL    Hematocrit 19.7 (LL) 37.0 - 48.5 %    MCV 73 (L) 82 - 98 fL    MCH 24.4 (L) 27.0 - 31.0 pg    MCHC 33.5 32.0 - 36.0 %    RDW 23.8 (H) 11.5 - 14.5 %    Platelets 148 (L) 150 - 350 K/uL    MPV 8.8 (L) 9.2 - 12.9 fL    Gran # 3.7 1.8 - 7.7 K/uL    Lymph # 0.7 (L) 1.0 - 4.8 K/uL    Mono # 0.7 0.3 - 1.0 K/uL    Eos # 0.0 0.0 - 0.5 K/uL    Baso # 0.01 0.00 - 0.20 K/uL    Gran% 71.2 38.0 - 73.0 %    Lymph% 14.3 (L) 18.0 - 48.0 %    Mono% 13.7 4.0 - 15.0 %    Eosinophil% 0.4 0.0 - 8.0 %    Basophil% 0.2 0.0 - 1.9 %    Differential Method Automated    Comprehensive metabolic panel    Collection Time: 05/23/17 12:12 AM   Result Value Ref Range    Sodium 136 136 - 145 mmol/L    Potassium 2.8 (L) 3.5 - 5.1 mmol/L    Chloride 104 95 - 110 mmol/L    CO2 24 23 - 29 mmol/L    Glucose 92 70 - 110 mg/dL    BUN, Bld 10 6 - 20 mg/dL    Creatinine 0.6 0.5 - 1.4 mg/dL    Calcium 8.4 (L) 8.7 - 10.5 mg/dL    Total Protein 7.4 6.0 - 8.4 g/dL    Albumin 2.0 (L) 3.5 - 5.2 g/dL    Total Bilirubin 1.3 (H) 0.1 - 1.0 mg/dL    Alkaline Phosphatase 154 (H) 55 - 135 U/L    AST 39 10 - 40 U/L    ALT 23 10 - 44 U/L    Anion Gap 8 8 - 16 mmol/L    eGFR if African American >60.0 >60 mL/min/1.73 m^2    eGFR if non African American >60.0 >60 mL/min/1.73 m^2   Magnesium    Collection Time: 05/23/17 12:12 AM   Result Value Ref Range    Magnesium 1.4 (L) 1.6 - 2.6 mg/dL   Phosphorus    Collection Time: 05/23/17 12:12 AM   Result Value Ref Range    Phosphorus 1.3 (L) 2.7 - 4.5 mg/dL   Type & Screen    Collection Time: 05/23/17 12:12 AM   Result Value Ref Range    Group & Rh O POS     Indirect Anthony POS    Reticulocytes    Collection Time: 05/23/17  12:12 AM   Result Value Ref Range    Retic 2.4 0.5 - 2.5 %   Blood culture    Collection Time: 05/23/17 12:12 AM   Result Value Ref Range    Blood Culture, Routine No Growth to date    Blood culture    Collection Time: 05/23/17 12:12 AM   Result Value Ref Range    Blood Culture, Routine No Growth to date    Antibody identification    Collection Time: 05/23/17 12:12 AM   Result Value Ref Range    Antibody Identification POS    Blood culture    Collection Time: 05/23/17  2:33 AM   Result Value Ref Range    Blood Culture, Routine No Growth to date    Lactate dehydrogenase    Collection Time: 05/23/17  2:34 AM   Result Value Ref Range     (H) 110 - 260 U/L   Protime-INR    Collection Time: 05/23/17  2:34 AM   Result Value Ref Range    Prothrombin Time 13.4 (H) 9.0 - 12.5 sec    INR 1.3 (H) 0.8 - 1.2   Lactic acid, plasma    Collection Time: 05/23/17  2:34 AM   Result Value Ref Range    Lactate (Lactic Acid) 0.8 0.5 - 2.2 mmol/L   Procalcitonin    Collection Time: 05/23/17  2:34 AM   Result Value Ref Range    Procalcitonin 118.59 (H) <0.25 ng/mL   Clostridium difficile EIA    Collection Time: 05/23/17  3:10 AM   Result Value Ref Range    C. diff Antigen Negative Negative    C difficile Toxins A+B, EIA Negative Negative   C-reactive protein    Collection Time: 05/23/17  5:20 AM   Result Value Ref Range    .8 (H) 0.0 - 8.2 mg/L   Sedimentation rate, manual    Collection Time: 05/23/17  5:20 AM   Result Value Ref Range    Sed Rate 140 (H) 0 - 20 mm/Hr       Diagnostic Results:  X-Ray: Reviewed     Multiple bony infarcts throughout BL tibias and femurs    Left radius with large lytic/erosive lesion with erosion of cortex    Right radius with 2 and ulna with 1 lytic/erosive lesions with cortical destruction     ASSESSMENT/PLAN:     A/P: Yamila Lan is a 46 y.o. with sickle cell disease and Gram negative marjorie bacteremia with multiple erosive lesion on BL UEs.  Inflammatory markers severely elevated, though this  could be due to her systemic bacteremia.  Uncertain if lytic lesions are tumor vs mets vs infection.  Recommend IR aspiration with cx and bx of most easily accessible lesion.  Pending results of IR studies, further imaging such as MRI vs CT could be considered.          John Joshi M.D. PGY2  Orthopedic Surgery Resident

## 2017-05-23 NOTE — ASSESSMENT & PLAN NOTE
- H/H 6.3/18 at OSH, no improvement with transfusion 1 unit PRBCs  - re-checking on admission, if < 7 will transfuse; pt with h/o antibodies making it difficult to match blood

## 2017-05-23 NOTE — ASSESSMENT & PLAN NOTE
- Bcx at [facility] with preliminary result of GNR in aerobic & anaerobic bottles    - called them today, speciation will be serratia. Sensitivities tomorrow after 8am, will call back then.  - repeat blood cx here pending (x2 peripheral and x1 from port)  - continue meropenem initiated at [facility] for now   - potential sources include bone/osteo, s/p x-ray, ortho consult. Recommending IR obtain biopsy/aspiration of one of her bone lesions to evaluate for infection  - at this time pt does not want port removed even though it is potential source of bacteremia  - ID consulted, appreciate their recs

## 2017-05-23 NOTE — HOSPITAL COURSE
Patient admitted to hospital medicine with infectious disease, orthopedics, and hematology/oncology consults. Contacted OSH for further culture results, patient is growing serratia, sensitive to cipro, ceftriaxone, cefepime, bactrim, amikacin. Resistant to cefazolin. Intermediate to cefoxitin, tobra.  5/23: Pt had xray, then MRI of right forearm with concern for possible multi-focal osteomyelitis, possible septic joint. Repeat blood cultures drawn here growing gram negative rods. Fever and tachycardia overnight around the time of blood transfusion.  5/24: Since repeat blood cultures still +, general surgery was consulted and removed the patient's port. Sensitivities here result as pan-sensitive serratia  5/25: IR performed bone biopsy of osteomyelitis as well as joint aspiration of right elbow. In addition to those cultures, repeat blood cultures were drawn to assess for clearance.  5/26: Port catheter tip culture growing gram negative marjorie. So are 2 of the IR bone biopsies. Repeat blood cultures so far negative x1 day.

## 2017-05-24 PROBLEM — R00.0 TACHYCARDIA: Status: ACTIVE | Noted: 2017-05-24

## 2017-05-24 PROBLEM — R19.7 DIARRHEA: Status: ACTIVE | Noted: 2017-05-24

## 2017-05-24 PROBLEM — A41.9 SEPSIS: Status: ACTIVE | Noted: 2017-05-24

## 2017-05-24 LAB
ANION GAP SERPL CALC-SCNC: 7 MMOL/L
BACTERIA UR CULT: NO GROWTH
BASOPHILS # BLD AUTO: 0.01 K/UL
BASOPHILS NFR BLD: 0.2 %
BILIRUB DIRECT SERPL-MCNC: 0.9 MG/DL
BILIRUB SERPL-MCNC: 1.8 MG/DL
BUN SERPL-MCNC: 7 MG/DL
CALCIUM SERPL-MCNC: 7.9 MG/DL
CHLORIDE SERPL-SCNC: 107 MMOL/L
CO2 SERPL-SCNC: 21 MMOL/L
CREAT SERPL-MCNC: 0.7 MG/DL
DIFFERENTIAL METHOD: ABNORMAL
EOSINOPHIL # BLD AUTO: 0 K/UL
EOSINOPHIL NFR BLD: 0.5 %
ERYTHROCYTE [DISTWIDTH] IN BLOOD BY AUTOMATED COUNT: 24.3 %
EST. GFR  (AFRICAN AMERICAN): >60 ML/MIN/1.73 M^2
EST. GFR  (NON AFRICAN AMERICAN): >60 ML/MIN/1.73 M^2
GLUCOSE SERPL-MCNC: 141 MG/DL
HAPTOGLOB SERPL-MCNC: <10 MG/DL
HCT VFR BLD AUTO: 22.1 %
HGB BLD-MCNC: 7.4 G/DL
HGB BLD-MCNC: 7.6 G/DL
LACTATE SERPL-SCNC: 0.9 MMOL/L
LDH SERPL L TO P-CCNC: 459 U/L
LDH SERPL L TO P-CCNC: 475 U/L
LYMPHOCYTES # BLD AUTO: 0.3 K/UL
LYMPHOCYTES NFR BLD: 7.7 %
MAGNESIUM SERPL-MCNC: 1.3 MG/DL
MCH RBC QN AUTO: 24.7 PG
MCHC RBC AUTO-ENTMCNC: 33.5 %
MCV RBC AUTO: 74 FL
MONOCYTES # BLD AUTO: 0.3 K/UL
MONOCYTES NFR BLD: 6.5 %
NEUTROPHILS # BLD AUTO: 3.5 K/UL
NEUTROPHILS NFR BLD: 84.4 %
PATHOLOGIST INTERPRETATION AB/XM: NORMAL
PATHOLOGIST INTERPRETATION TRANSF REACTION: NORMAL
PHOSPHATE SERPL-MCNC: 1.4 MG/DL
PLATELET # BLD AUTO: 114 K/UL
PLATELET BLD QL SMEAR: ABNORMAL
PMV BLD AUTO: 9.2 FL
POTASSIUM SERPL-SCNC: 3.1 MMOL/L
RBC # BLD AUTO: 3 M/UL
SODIUM SERPL-SCNC: 135 MMOL/L
WBC # BLD AUTO: 4.13 K/UL

## 2017-05-24 PROCEDURE — 93010 ELECTROCARDIOGRAM REPORT: CPT | Mod: ,,, | Performed by: INTERNAL MEDICINE

## 2017-05-24 PROCEDURE — 99232 SBSQ HOSP IP/OBS MODERATE 35: CPT | Mod: ,,, | Performed by: INTERNAL MEDICINE

## 2017-05-24 PROCEDURE — 83615 LACTATE (LD) (LDH) ENZYME: CPT

## 2017-05-24 PROCEDURE — 63600175 PHARM REV CODE 636 W HCPCS: Performed by: PHYSICIAN ASSISTANT

## 2017-05-24 PROCEDURE — 76937 US GUIDE VASCULAR ACCESS: CPT

## 2017-05-24 PROCEDURE — 86078 PHYS BLOOD BANK SERV REACTJ: CPT | Mod: ,,, | Performed by: PATHOLOGY

## 2017-05-24 PROCEDURE — 83605 ASSAY OF LACTIC ACID: CPT

## 2017-05-24 PROCEDURE — 99222 1ST HOSP IP/OBS MODERATE 55: CPT | Mod: GC,,, | Performed by: ORTHOPAEDIC SURGERY

## 2017-05-24 PROCEDURE — 99232 SBSQ HOSP IP/OBS MODERATE 35: CPT | Mod: ,,, | Performed by: HOSPITALIST

## 2017-05-24 PROCEDURE — 93005 ELECTROCARDIOGRAM TRACING: CPT

## 2017-05-24 PROCEDURE — 25000003 PHARM REV CODE 250: Performed by: NURSE PRACTITIONER

## 2017-05-24 PROCEDURE — 36569 INSJ PICC 5 YR+ W/O IMAGING: CPT

## 2017-05-24 PROCEDURE — 25000003 PHARM REV CODE 250: Performed by: INTERNAL MEDICINE

## 2017-05-24 PROCEDURE — 80048 BASIC METABOLIC PNL TOTAL CA: CPT

## 2017-05-24 PROCEDURE — 82248 BILIRUBIN DIRECT: CPT

## 2017-05-24 PROCEDURE — 85018 HEMOGLOBIN: CPT

## 2017-05-24 PROCEDURE — 87070 CULTURE OTHR SPECIMN AEROBIC: CPT

## 2017-05-24 PROCEDURE — 83010 ASSAY OF HAPTOGLOBIN QUANT: CPT

## 2017-05-24 PROCEDURE — 84100 ASSAY OF PHOSPHORUS: CPT

## 2017-05-24 PROCEDURE — 0JPV0XZ REMOVAL OF TUNNELED VASCULAR ACCESS DEVICE FROM UPPER EXTREMITY SUBCUTANEOUS TISSUE AND FASCIA, OPEN APPROACH: ICD-10-PCS | Performed by: SURGERY

## 2017-05-24 PROCEDURE — 83735 ASSAY OF MAGNESIUM: CPT

## 2017-05-24 PROCEDURE — 36415 COLL VENOUS BLD VENIPUNCTURE: CPT

## 2017-05-24 PROCEDURE — C1751 CATH, INF, PER/CENT/MIDLINE: HCPCS

## 2017-05-24 PROCEDURE — 82247 BILIRUBIN TOTAL: CPT

## 2017-05-24 PROCEDURE — 20600001 HC STEP DOWN PRIVATE ROOM

## 2017-05-24 PROCEDURE — 63600175 PHARM REV CODE 636 W HCPCS: Performed by: NURSE PRACTITIONER

## 2017-05-24 PROCEDURE — 63600175 PHARM REV CODE 636 W HCPCS: Performed by: INTERNAL MEDICINE

## 2017-05-24 PROCEDURE — 87077 CULTURE AEROBIC IDENTIFY: CPT

## 2017-05-24 PROCEDURE — 85025 COMPLETE CBC W/AUTO DIFF WBC: CPT

## 2017-05-24 PROCEDURE — 99222 1ST HOSP IP/OBS MODERATE 55: CPT | Mod: ,,, | Performed by: SURGERY

## 2017-05-24 PROCEDURE — 87186 SC STD MICRODIL/AGAR DIL: CPT

## 2017-05-24 RX ORDER — POTASSIUM CHLORIDE 750 MG/1
40 CAPSULE, EXTENDED RELEASE ORAL ONCE
Status: COMPLETED | OUTPATIENT
Start: 2017-05-24 | End: 2017-05-24

## 2017-05-24 RX ORDER — METOPROLOL TARTRATE 1 MG/ML
5 INJECTION, SOLUTION INTRAVENOUS ONCE
Status: DISCONTINUED | OUTPATIENT
Start: 2017-05-24 | End: 2017-05-24

## 2017-05-24 RX ORDER — HYDROMORPHONE HYDROCHLORIDE 1 MG/ML
1 INJECTION, SOLUTION INTRAMUSCULAR; INTRAVENOUS; SUBCUTANEOUS
Status: DISCONTINUED | OUTPATIENT
Start: 2017-05-24 | End: 2017-05-26

## 2017-05-24 RX ORDER — METHYLPREDNISOLONE SOD SUCC 125 MG
125 VIAL (EA) INJECTION ONCE
Status: COMPLETED | OUTPATIENT
Start: 2017-05-24 | End: 2017-05-24

## 2017-05-24 RX ORDER — LOPERAMIDE HYDROCHLORIDE 2 MG/1
2 CAPSULE ORAL 4 TIMES DAILY PRN
Status: DISCONTINUED | OUTPATIENT
Start: 2017-05-24 | End: 2017-05-31 | Stop reason: HOSPADM

## 2017-05-24 RX ADMIN — BUSPIRONE HYDROCHLORIDE 15 MG: 5 TABLET ORAL at 06:05

## 2017-05-24 RX ADMIN — MAGNESIUM SULFATE HEPTAHYDRATE 2 G: 500 INJECTION, SOLUTION INTRAMUSCULAR; INTRAVENOUS at 04:05

## 2017-05-24 RX ADMIN — HYDROMORPHONE HYDROCHLORIDE 1 MG: 1 INJECTION, SOLUTION INTRAMUSCULAR; INTRAVENOUS; SUBCUTANEOUS at 09:05

## 2017-05-24 RX ADMIN — BUSPIRONE HYDROCHLORIDE 15 MG: 5 TABLET ORAL at 09:05

## 2017-05-24 RX ADMIN — CITALOPRAM HYDROBROMIDE 20 MG: 20 TABLET ORAL at 09:05

## 2017-05-24 RX ADMIN — CARVEDILOL 6.25 MG: 6.25 TABLET, FILM COATED ORAL at 09:05

## 2017-05-24 RX ADMIN — OXYCODONE HYDROCHLORIDE 15 MG: 5 TABLET ORAL at 01:05

## 2017-05-24 RX ADMIN — ZIPRASIDONE HYDROCHLORIDE 40 MG: 40 CAPSULE ORAL at 04:05

## 2017-05-24 RX ADMIN — MORPHINE SULFATE 30 MG: 30 TABLET, EXTENDED RELEASE ORAL at 09:05

## 2017-05-24 RX ADMIN — HYDROMORPHONE HYDROCHLORIDE 1 MG: 1 INJECTION, SOLUTION INTRAMUSCULAR; INTRAVENOUS; SUBCUTANEOUS at 06:05

## 2017-05-24 RX ADMIN — METHYLPREDNISOLONE SODIUM SUCCINATE 125 MG: 125 INJECTION, POWDER, FOR SOLUTION INTRAMUSCULAR; INTRAVENOUS at 04:05

## 2017-05-24 RX ADMIN — SODIUM CHLORIDE: 0.9 INJECTION, SOLUTION INTRAVENOUS at 06:05

## 2017-05-24 RX ADMIN — CLONAZEPAM 1 MG: 0.5 TABLET ORAL at 09:05

## 2017-05-24 RX ADMIN — CEFEPIME HYDROCHLORIDE 2 G: 2 INJECTION, SOLUTION INTRAVENOUS at 06:05

## 2017-05-24 RX ADMIN — HYDROMORPHONE HYDROCHLORIDE 1 MG: 1 INJECTION, SOLUTION INTRAMUSCULAR; INTRAVENOUS; SUBCUTANEOUS at 04:05

## 2017-05-24 RX ADMIN — CARVEDILOL 6.25 MG: 6.25 TABLET, FILM COATED ORAL at 04:05

## 2017-05-24 RX ADMIN — BUSPIRONE HYDROCHLORIDE 15 MG: 5 TABLET ORAL at 04:05

## 2017-05-24 RX ADMIN — HYDROMORPHONE HYDROCHLORIDE 1 MG: 1 INJECTION, SOLUTION INTRAMUSCULAR; INTRAVENOUS; SUBCUTANEOUS at 12:05

## 2017-05-24 RX ADMIN — ZIPRASIDONE HYDROCHLORIDE 40 MG: 40 CAPSULE ORAL at 09:05

## 2017-05-24 RX ADMIN — POTASSIUM CHLORIDE 40 MEQ: 750 CAPSULE, EXTENDED RELEASE ORAL at 12:05

## 2017-05-24 RX ADMIN — OXYCODONE HYDROCHLORIDE 15 MG: 5 TABLET ORAL at 06:05

## 2017-05-24 RX ADMIN — ACETAMINOPHEN 650 MG: 325 TABLET ORAL at 02:05

## 2017-05-24 NOTE — SUBJECTIVE & OBJECTIVE
Interval History:   NAEON. Febrile up to 104 after receiving blood transfusion. WBC wnl    Review of Systems   Constitutional: Positive for appetite change, fatigue and unexpected weight change. Negative for chills and fever.   HENT: Negative for congestion.    Respiratory: Negative for cough and shortness of breath.    Gastrointestinal: Positive for diarrhea. Negative for abdominal pain and nausea.   Genitourinary: Negative for dysuria.   Musculoskeletal: Positive for arthralgias.   Skin: Negative for rash.   Neurological: Negative for headaches.   Psychiatric/Behavioral: Negative for sleep disturbance.     Objective:     Vital Signs (Most Recent):  Temp: 98.8 °F (37.1 °C) (05/24/17 0820)  Pulse: 79 (05/24/17 1220)  Resp: 19 (05/24/17 1220)  BP: 112/78 (05/24/17 1220)  SpO2: 96 % (05/24/17 1220) Vital Signs (24h Range):  Temp:  [98.6 °F (37 °C)-104.1 °F (40.1 °C)] 98.8 °F (37.1 °C)  Pulse:  [] 79  Resp:  [15-25] 19  SpO2:  [91 %-100 %] 96 %  BP: ()/(65-98) 112/78     Weight: 76.6 kg (168 lb 14 oz)  Body mass index is 28.99 kg/m².    Estimated Creatinine Clearance: 102.8 mL/min (based on Cr of 0.7).    Physical Exam   Constitutional: She is oriented to person, place, and time. She appears well-developed and well-nourished. No distress.   HENT:   Head: Normocephalic and atraumatic.   Eyes: EOM are normal. Pupils are equal, round, and reactive to light.   Neck: Normal range of motion. Neck supple.   Cardiovascular: Normal rate, regular rhythm, normal heart sounds and intact distal pulses.    Pulmonary/Chest:   Left subclavian port without surrounding tenderness, erythema or edema   Abdominal: Soft. Bowel sounds are normal. She exhibits no distension. There is no tenderness.   Musculoskeletal: Normal range of motion. She exhibits tenderness. She exhibits no edema or deformity.   Pain x4 extremities, L arm > R arm; BLE pain greatest just distal to knees   Neurological: She is alert and oriented to person,  place, and time.   Skin: Skin is warm. Capillary refill takes less than 2 seconds.   Psychiatric: She has a normal mood and affect. Her behavior is normal.   Nursing note and vitals reviewed.      Significant Labs:   Microbiology Results (last 7 days)     Procedure Component Value Units Date/Time    Blood culture [[id number]] Collected:  05/23/17 0233    Order Status:  Completed Specimen:  Blood from Line, Port A Cath Updated:  05/24/17 1219     Blood Culture, Routine Gram stain aer bottle: Gram negative rods      Blood Culture, Routine Positive results previously called 05/23/2017  19:59     Blood Culture, Routine --     SERRATIA MARCESCENS  For susceptibility see order #[id number]      Narrative:       From port    Blood culture [[id number]] Collected:  05/23/17 0012    Order Status:  Completed Specimen:  Blood Updated:  05/24/17 1218     Blood Culture, Routine Gram stain aer bottle: Gram negative rods      Blood Culture, Routine Results called to and read back by: Terrence Ewing RN 05/23/2017  17:12     Blood Culture, Routine --     SERRATIA MARCESCENS  Susceptibility pending      Narrative:       Collection has been rescheduled by RNS at 5/22/2017 22:49 Reason: no   armband.  Collection has been rescheduled by RNS at 5/22/2017 22:56 Reason: pt.   in restroom.  Collection has been rescheduled by RNS at 5/22/2017 23:16 Reason: no   armband\ Lew notified they are still waiting to print it out.  Collection has been rescheduled by RNS at 5/22/2017 22:49 Reason: no   armband.  Collection has been rescheduled by RNS at 5/22/2017 22:56 Reason: pt.   in restroom.  Collection has been rescheduled by RNS at 5/22/2017 23:16 Reason: no   armband\ Lew notified they are still waiting to print it out.    Blood culture [[id number]] Collected:  05/23/17 0012    Order Status:  Completed Specimen:  Blood Updated:  05/24/17 0612     Blood Culture, Routine No Growth to date     Blood Culture, Routine No Growth to date     Narrative:       Collection has been rescheduled by RNS at 5/22/2017 22:49 Reason: no   armband.  Collection has been rescheduled by RNS at 5/22/2017 22:56 Reason: pt.   in restroom.  Collection has been rescheduled by RNS at 5/22/2017 23:16 Reason: no   armband\ Lew notified they are still waiting to print it out.  Collection has been rescheduled by RNS at 5/22/2017 22:49 Reason: no   armband.  Collection has been rescheduled by RNS at 5/22/2017 22:56 Reason: pt.   in restroom.  Collection has been rescheduled by RNS at 5/22/2017 23:16 Reason: no   armband\ Lew notified they are still waiting to print it out.    Urine culture [[id number]] Collected:  05/23/17 1529    Order Status:  Sent Specimen:  Urine from Urine, Clean Catch Updated:  05/23/17 1658    Clostridium difficile EIA [[id number]] Collected:  05/23/17 0310    Order Status:  Completed Specimen:  Stool from Stool Updated:  05/23/17 1045     C. diff Antigen Negative     C difficile Toxins A+B, EIA Negative     Comment: Testing not recommended for children <24 months old.             Significant Imaging: I have reviewed all pertinent imaging results/findings within the past 24 hours.

## 2017-05-24 NOTE — CONSULTS
Midline placed in right brachial vein, 20gx8 catheter length. Lot#[id number]. Used real time ultrasound. Image recorded and saved.

## 2017-05-24 NOTE — ASSESSMENT & PLAN NOTE
- H/H 6.3/18 at OSH, no improvement with transfusion 1 unit PRBCs   - with hx sickle cell disease, pt reports her hemoglobin is usually around 7    - 7.4 today    - will continue to monitor, but likely not attempt transfusion again unless hemoglobin reaches ~6   - pt does not appear to have acute chest at this time   - pain control, oxygen, IVF   - hem/onc also consulted, appreciate their recs

## 2017-05-24 NOTE — PROGRESS NOTES
Spoke with [facility] microbiology lab:  Serratia is resistant to cefazolin, but sensitive to cipro, ceftriaxone, cefepime, amikacin  Intermediate to cefoxitin, tobra

## 2017-05-24 NOTE — SIGNIFICANT EVENT
0245  RN notified by tele that pt was sustaining in 130's.  RN entered room to find pt diaphorectic.  Temp checked orally by RN and it was 104.1.  RN stopped blood and immediately started pt back on her NS continous infusion.  RN paged Dr Payan.    0330  Urine collected, remainder of unit bagged up to send back to blood bank, lab notified to collect stat labs peripherally.    0345  RN notified Jennifer Bradford that pt's heart rate sustaining in 150's.  New order for metoprolol iv.  Per Jennifer only give if bp maintains above 130, if dips prior to admin of  Metoprolol due not give.  0400  Lab collected all labs and blood etc returned to blood bank.

## 2017-05-24 NOTE — ASSESSMENT & PLAN NOTE
- Bcx at [facility] with preliminary result of GNR in aerobic & anaerobic bottles    - Serratia, sensitive to ceftriaxone, cipro, cefepime, bactrim. Resistant to cefazolin, intermidate to tobra and cefoxitin.  - repeat blood cx here also with gram negative rods  - ID consulted, appreciate their recs  - potential sources include port and osteomyelitis.    - Gen surg consult for port removal   - patient's MRI right arm with multiple lesions concerning for osteomyelitis, along with possible septic joint     - IR consulted for bone biopsy, planning to perform on 5/25    - ortho consulted for consideration of aspiration of joint fluid  - will consider de-escalation to ceftriaxone today

## 2017-05-24 NOTE — ASSESSMENT & PLAN NOTE
c dif negative   - electrolyte abnormalities   - improving slightly, but still a few times a day   - will treat symptomatically with loperamide

## 2017-05-24 NOTE — CONSULTS
[facility]  General Surgery  Consult Note    Inpatient consult to General Surgery  Consult performed by: ROSALVA HYATT  Consult ordered by: TOR STUART  Reason for consult: Patient with sepsis from gram negative marjorie bacteremia, has a port in place, eval for port removal for source control        Subjective:     Chief Complaint/Reason for Admission: Osteonecrosis    History of Present Illness:   Patient is a 45 yo female w pmhx if HTN, sickle cell who was transferred from OSH who presented with sickle cell crisis and bilateral arm pain. Associated symptoms include fever, chills, body aches. Blood cultures at OSH grew GNR and she was treated with cefepime and meropenem). BUE xrays are showing periosteal dz concerning for osteomyelitis vs metastatic disease. She has a current port in place on the left. This is her second port, it has been in place for 5 years. First port was on left side as well and was in place for 10years. It was removed for bacteremia as well. She gets this current port flushed at least once monthly. She states she does use this port more frequently than she used the old one. Id has been consulted during this admission and recommends cefepime and removal of port. General surgery has been consulted for port removal.    No current facility-administered medications on file prior to encounter.      Current Outpatient Prescriptions on File Prior to Encounter   Medication Sig    carvedilol (COREG) 6.25 MG tablet Take 6.25 mg by mouth 2 (two) times daily with meals.    cyclobenzaprine (FLEXERIL) 5 MG tablet Take 10 mg by mouth once daily.     zolpidem (AMBIEN) 5 MG Tab Take 5 mg by mouth nightly as needed.       Review of patient's allergies indicates:   Allergen Reactions    Floxin [ofloxacin]     Folic acid containing drugs     Toradol [ketorolac]        Past Medical History:   Diagnosis Date    Hypertension     Sickle cell anemia      Past Surgical History:    Procedure Laterality Date     SECTION      LEG SURGERY      PORTACATH PLACEMENT      portacath removal      TUBAL LIGATION       Family History     None        Social History Main Topics    Smoking status: Never Smoker    Smokeless tobacco: Not on file    Alcohol use No    Drug use: No    Sexual activity: Not Currently     Review of Systems   Constitutional: Positive for chills and fever.   HENT: Negative for congestion and rhinorrhea.    Eyes: Negative for photophobia and visual disturbance.   Respiratory: Negative for cough and shortness of breath.    Cardiovascular: Negative for chest pain and palpitations.   Gastrointestinal: Negative for nausea and vomiting.   Endocrine: Negative for cold intolerance and heat intolerance.   Musculoskeletal: Positive for arthralgias and myalgias.   Skin: Negative for rash and wound.   Allergic/Immunologic: Negative for immunocompromised state.   Neurological: Negative for tremors and weakness.   Hematological: Negative for adenopathy.   Psychiatric/Behavioral: Negative for agitation.     Objective:     Vital Signs (Most Recent):  Temp: 98.8 °F (37.1 °C) (17 0820)  Pulse: 79 (17 1220)  Resp: 19 (17 1220)  BP: 112/78 (17 1220)  SpO2: 96 % (17 1220) Vital Signs (24h Range):  Temp:  [98.6 °F (37 °C)-104.1 °F (40.1 °C)] 98.8 °F (37.1 °C)  Pulse:  [] 79  Resp:  [15-25] 19  SpO2:  [91 %-100 %] 96 %  BP: ()/(65-98) 112/78     Weight: 76.6 kg (168 lb 14 oz)  Body mass index is 28.99 kg/m².      Intake/Output Summary (Last 24 hours) at 17 1306  Last data filed at 17 0600   Gross per 24 hour   Intake           1679.7 ml   Output              350 ml   Net           1329.7 ml       Physical Exam   Constitutional: She is oriented to person, place, and time. She appears well-developed and well-nourished. No distress.   HENT:   Head: Normocephalic and atraumatic.   Eyes: No scleral icterus.   Neck: Normal range of motion.  Neck supple.   Cardiovascular: Normal rate and regular rhythm.    Pulmonary/Chest: Effort normal. No respiratory distress.   Left sided port in place - currently in use   Abdominal:   Soft, NTND, no masses   Neurological: She is alert and oriented to person, place, and time.   Skin: Skin is warm and dry.   Psychiatric: She has a normal mood and affect.       Significant Labs:  BMP:   Recent Labs  Lab 05/24/17  0355   *   *   K 3.1*      CO2 21*   BUN 7   CREATININE 0.7   CALCIUM 7.9*   MG 1.3*     CBC:   Recent Labs  Lab 05/24/17  0355   WBC 4.13   RBC 3.00*   HGB 7.4*  7.6*   HCT 22.1*   *   MCV 74*   MCH 24.7*   MCHC 33.5     CMP:   Recent Labs  Lab 05/23/17  0012  05/24/17  0355   GLU 92  < > 141*   CALCIUM 8.4*  < > 7.9*   ALBUMIN 2.0*  --   --    PROT 7.4  --   --      < > 135*   K 2.8*  < > 3.1*   CO2 24  < > 21*     < > 107   BUN 10  < > 7   CREATININE 0.6  < > 0.7   ALKPHOS 154*  --   --    ALT 23  --   --    AST 39  --   --    BILITOT 1.3*  --  1.8*   < > = values in this interval not displayed.  Coagulation:   Recent Labs  Lab 05/23/17  0234   INR 1.3*     Lactic Acid:   Recent Labs  Lab 05/24/17  0745   LACTATE 0.9     LFTs:   Recent Labs  Lab 05/23/17 0012 05/24/17  0355   ALT 23  --    AST 39  --    ALKPHOS 154*  --    BILITOT 1.3* 1.8*   PROT 7.4  --    ALBUMIN 2.0*  --      All pertinent labs from the last 24 hours have been reviewed.    Assessment/Plan:     Active Diagnoses:    Diagnosis Date Noted POA    PRINCIPAL PROBLEM:  Osteonecrosis [M87.9] 05/23/2017 Yes    Diarrhea [R19.7] 05/24/2017 Yes    Tachycardia [R00.0] 05/24/2017 Unknown    Sepsis [A41.9] 05/24/2017 Yes    Acute hematogenous osteomyelitis [M86.00]  Unknown    Pyogenic arthritis of right wrist [M00.9]  Unknown    Gram-negative bacteremia [R78.81] 05/23/2017 Yes    Hypokalemia [E87.6] 05/23/2017 Yes    Hypomagnesemia [E83.42] 05/23/2017 Yes    Hypophosphatemia [E83.39] 05/23/2017 Yes     Sickle cell crisis [D57.00] 05/22/2017 Yes      Problems Resolved During this Admission:    Diagnosis Date Noted Date Resolved POA     Will remove port at the bedside as soon as alternative IV access has been obtained  May have diet ad carrie  Medical management per primary    Thank you for your consult. I will follow-up with patient. Please contact us if you have any additional questions.    Sheba Fong PA-C   [id number]  General Surgery  [facility]

## 2017-05-24 NOTE — PLAN OF CARE
Per dr ma in [facility], dc plan is hh/home infusion for iv abx.  IRMA Ba in morning meeting and is aware of dc plan.     05/24/17 1054   Right Care Assessment   Can the patient answer the patient profile reliably? Yes, cognitively intact   How often would a person be available to care for the patient? Whenever needed   Describe the patient's ability to walk at the present time. Minor restrictions or changes   How does the patient rate their overall health at the present time? Poor   Number of comorbid conditions (as recorded on the chart) Two   During the past month, has the patient often been bothered by feeling down, depressed or hopeless? No   During the past month, has the patient often been bothered by little interest or pleasure in doing things? No

## 2017-05-24 NOTE — PROGRESS NOTES
[facility]  Infectious Disease  Progress Note    Patient Name: Yamila Lan  MRN: [id number]  Admission Date: 5/22/2017  Length of Stay: 2 days  Attending Physician: Edgardo Avila MD  Primary Care Provider: Silvana Dhaliwal MD    Isolation Status: No active isolations  Assessment/Plan:      Gram-negative bacteremia    - blood cx from [facility] growing Serratia sensitive to cefepime, rocephin and cipro  - blood cx here growing serratia marscensens  - given lack of clearance of cultures- port needs to be removed  - continue cefepime 2g q 12 hours for now  - needs tissue bx given osteomyelitis           Thank you for your consult. I will follow-up with patient. Please contact us if you have any additional questions.    Ashleigh Llanos MD  Infectious Disease  [facility]    Subjective:     Principal Problem:Osteonecrosis    HPI: Ms. Lan is a 46 year old female with history of sickle cell disease who presented to [facility] complaining of bilateral arm pain L>R that had been persistent for about 4 weeks. She states that she experienced a fall and extended both arms out to catch her fall. She endorses a 15 pound unintentional weight loss since the beginning of the year but feels this is secondary to being hospitalized so much more frequently since January. Patient denied fevers chills, night sweats.      At the OSH, she developed fever after receiving a blood transfusion. Blood cultures drawn 5/21 grew gram negative rods consistent with serratia. BLUE X rays were concerning for erosive bone process with osteonecrosis vs metastatic dz per outside report. Patient has had a port in place for five years. She denies tenderness or erythema at this site. She is hesitant to have port removed. Has had diarrhea since starting abx for positive blood cultures and is C. Diff negative. She denies every having bacteremia or serious infectious in the past.   Interval History:   NAEON.  Febrile up to 104 after receiving blood transfusion. WBC wnl    Review of Systems   Constitutional: Positive for appetite change, fatigue and unexpected weight change. Negative for chills and fever.   HENT: Negative for congestion.    Respiratory: Negative for cough and shortness of breath.    Gastrointestinal: Positive for diarrhea. Negative for abdominal pain and nausea.   Genitourinary: Negative for dysuria.   Musculoskeletal: Positive for arthralgias.   Skin: Negative for rash.   Neurological: Negative for headaches.   Psychiatric/Behavioral: Negative for sleep disturbance.     Objective:     Vital Signs (Most Recent):  Temp: 98.8 °F (37.1 °C) (05/24/17 0820)  Pulse: 79 (05/24/17 1220)  Resp: 19 (05/24/17 1220)  BP: 112/78 (05/24/17 1220)  SpO2: 96 % (05/24/17 1220) Vital Signs (24h Range):  Temp:  [98.6 °F (37 °C)-104.1 °F (40.1 °C)] 98.8 °F (37.1 °C)  Pulse:  [] 79  Resp:  [15-25] 19  SpO2:  [91 %-100 %] 96 %  BP: ()/(65-98) 112/78     Weight: 76.6 kg (168 lb 14 oz)  Body mass index is 28.99 kg/m².    Estimated Creatinine Clearance: 102.8 mL/min (based on Cr of 0.7).    Physical Exam   Constitutional: She is oriented to person, place, and time. She appears well-developed and well-nourished. No distress.   HENT:   Head: Normocephalic and atraumatic.   Eyes: EOM are normal. Pupils are equal, round, and reactive to light.   Neck: Normal range of motion. Neck supple.   Cardiovascular: Normal rate, regular rhythm, normal heart sounds and intact distal pulses.    Pulmonary/Chest:   Left subclavian port without surrounding tenderness, erythema or edema   Abdominal: Soft. Bowel sounds are normal. She exhibits no distension. There is no tenderness.   Musculoskeletal: Normal range of motion. She exhibits tenderness. She exhibits no edema or deformity.   Pain x4 extremities, L arm > R arm; BLE pain greatest just distal to knees   Neurological: She is alert and oriented to person, place, and time.   Skin: Skin  is warm. Capillary refill takes less than 2 seconds.   Psychiatric: She has a normal mood and affect. Her behavior is normal.   Nursing note and vitals reviewed.      Significant Labs:   Microbiology Results (last 7 days)     Procedure Component Value Units Date/Time    Blood culture [[id number]] Collected:  05/23/17 0233    Order Status:  Completed Specimen:  Blood from Line, Port A Cath Updated:  05/24/17 1219     Blood Culture, Routine Gram stain aer bottle: Gram negative rods      Blood Culture, Routine Positive results previously called 05/23/2017  19:59     Blood Culture, Routine --     SERRATIA MARCESCENS  For susceptibility see order #[id number]      Narrative:       From port    Blood culture [[id number]] Collected:  05/23/17 0012    Order Status:  Completed Specimen:  Blood Updated:  05/24/17 1218     Blood Culture, Routine Gram stain aer bottle: Gram negative rods      Blood Culture, Routine Results called to and read back by: Terrence Ewing RN 05/23/2017  17:12     Blood Culture, Routine --     SERRATIA MARCESCENS  Susceptibility pending      Narrative:       Collection has been rescheduled by RNS at 5/22/2017 22:49 Reason: no   armband.  Collection has been rescheduled by RNS at 5/22/2017 22:56 Reason: pt.   in restroom.  Collection has been rescheduled by RNS at 5/22/2017 23:16 Reason: no   armband\ Lew notified they are still waiting to print it out.  Collection has been rescheduled by RNS at 5/22/2017 22:49 Reason: no   armband.  Collection has been rescheduled by RNS at 5/22/2017 22:56 Reason: pt.   in restroom.  Collection has been rescheduled by RNS at 5/22/2017 23:16 Reason: no   armband\ Lew notified they are still waiting to print it out.    Blood culture [[id number]] Collected:  05/23/17 0012    Order Status:  Completed Specimen:  Blood Updated:  05/24/17 0612     Blood Culture, Routine No Growth to date     Blood Culture, Routine No Growth to date    Narrative:       Collection  has been rescheduled by RNS at 5/22/2017 22:49 Reason: no   armband.  Collection has been rescheduled by RNS at 5/22/2017 22:56 Reason: pt.   in restroom.  Collection has been rescheduled by RNS at 5/22/2017 23:16 Reason: no   armband\ Lew notified they are still waiting to print it out.  Collection has been rescheduled by RNS at 5/22/2017 22:49 Reason: no   armband.  Collection has been rescheduled by RNS at 5/22/2017 22:56 Reason: pt.   in restroom.  Collection has been rescheduled by RNS at 5/22/2017 23:16 Reason: no   armband\ Lew notified they are still waiting to print it out.    Urine culture [[id number]] Collected:  05/23/17 1529    Order Status:  Sent Specimen:  Urine from Urine, Clean Catch Updated:  05/23/17 1658    Clostridium difficile EIA [[id number]] Collected:  05/23/17 0310    Order Status:  Completed Specimen:  Stool from Stool Updated:  05/23/17 1045     C. diff Antigen Negative     C difficile Toxins A+B, EIA Negative     Comment: Testing not recommended for children <24 months old.             Significant Imaging: I have reviewed all pertinent imaging results/findings within the past 24 hours.

## 2017-05-24 NOTE — PROGRESS NOTES
"Attestation    I have reviewed the chart and discussed the patient with the academic medicine team, including overnight events, results, and assessment and plan. I have supervised the care provided by the resident team, and personally seen and evaluated the patient, and agree with the resident's documentation      ID, GS and Ortho consults noted w appreciation. IR bx rt forearm in AM. PORT will be removed once a midline has been established    Edgardo Avila MD [facility] Medicine       [facility] Hospital Medicine  Progress Note    Patient Name: Yamila Lan  MRN: [id number]  Patient Class: IP- Inpatient   Admission Date: 5/22/2017  Length of Stay: 2 days  Attending Physician: Edgardo Avila MD  Primary Care Provider: Silvana Dhaliwal MD    [facility] Medicine Team: [facility] MED  Moe Shields MD    Subjective:     Principal Problem:Osteonecrosis    HPI:  Patient is a 46 y.o. female with significant past medical history of HTN and sickle cell dz was transferred from [facility]. Pt had originally presented to the hospital c/o sickle cell crisis; specifically bilateral arm pain (L > R) for two weeks. Pt thought pain may have been related to a fall she sustained approximately 1 month ago.  She also endorses h/o fever, chills and body aches. While admitted the pt began having fevers and Bcx grew out GNR (initally tx with cefepime, now on meropenem). BUE xrays are showing periosteal dz concerning for possible osteomyelitis vs metastatic dz. Prior to admission pt had port in place 2/2 poor venous access.     Latest labs at OSH revealed WBC 7.1, anemia (H/H 6.3/19), retic count 4.7, mild hypokalemia (K 3.1), mild hypomagnesemia (Mag 1.5), ESR & CRP elevated (118 and 103 respectively), lactic 1.1; INR 1.4, UA neg for infection, UPT neg. Bcx preliminary results showing GNR in both aerobic and anaerobic bottles. Xray LUE  showed "osseous erosive lesions within the mid humeral " "diaphysis bone marrow with additional destructive lesions noted in the proximal radial diaphysis. Shoulder and elbow joints intact. Findings concerning for metastatic disease to humerus and proximal radius." Xray RUE revealed "destructive mauro lesion within mid humeral diaphyseal bone marrow. Destructive lesions additional seening involiving radius & ulna. Should and elbow joints intact." Pt also had MRI of left shoulder performed revealing changes consistent with "early osteonecrosis along superior and medial aspect of left humeral head."  CXRY was neg for acute cardiopulmonary process. Pt was originally supposed to be transfused 3 units PRBCs, however pt has h/o antibodies with difficulty matching blood and had a questionable reaction to one of the units. It is unclear from chart how many units were received. Per OSH records, the pt's port was d/c'd 2/2 bacteremia and PICC was placed, however it appears that this did not occur and pt's port is still in place. L shoulder arthrocentesis was performed, gram stain without organisms or WBCs.       Hospital Course:  Patient admitted to hospital medicine with infectious disease, orthopedics, and hematology/oncology consults. Contacted OSH for further culture results, patient is growing serratia, sensitive to cipro, ceftriaxone, cefepime, bactrim, amikacin. Resistant to cefazolin. Intermediate to cefoxitin, tobra.  5/23: Pt had xray, then MRI of right forearm with concern for possible multi-focal osteomyelitis, possible septic joint. Repeat blood cultures drawn here growing gram negative rods. Fever and tachycardia overnight around the time of blood transfusion.  5/24: General surgery consulted for port removal. IR planning for bone biopsy in AM. Ortho recs pending regarding the possible septic joint.    Interval History: Overnight, fever to 104, tachycardia, after blood transfusion. This morning, pt reports feeling a little bit better than last night. Still with pain, " PRN meds help but don't last very long. Pain still in arms/legs, no CP/abd pain. No SOB/cough, no dysuria. Diarrhea continues, about 2x/day, which is improved from before.    Review of Systems   Constitutional: Positive for chills, fatigue and fever. Negative for appetite change.   HENT: Negative.  Negative for sinus pressure and sore throat.    Eyes: Negative.  Negative for visual disturbance.   Respiratory: Positive for shortness of breath (with exertion). Negative for cough.    Cardiovascular: Negative.  Negative for chest pain and palpitations.   Gastrointestinal: Positive for diarrhea (for 2 weeks, non-bloody). Negative for abdominal pain, nausea and vomiting.   Endocrine: Negative.    Genitourinary: Negative.  Negative for difficulty urinating and dysuria.   Musculoskeletal: Positive for arthralgias and myalgias. Negative for joint swelling.   Skin: Negative.    Allergic/Immunologic: Negative.    Neurological: Negative.  Negative for headaches.   Psychiatric/Behavioral: Negative.      Objective:     Vital Signs (Most Recent):  Temp: 98.8 °F (37.1 °C) (05/24/17 0820)  Pulse: 108 (05/24/17 0820)  Resp: (!) 23 (05/24/17 0400)  BP: 103/72 (05/24/17 0820)  SpO2: (!) 94 % (05/24/17 0820) Vital Signs (24h Range):  Temp:  [98.6 °F (37 °C)-104.1 °F (40.1 °C)] 98.8 °F (37.1 °C)  Pulse:  [] 108  Resp:  [15-25] 23  SpO2:  [91 %-100 %] 94 %  BP: ()/(60-98) 103/72     Weight: 76.6 kg (168 lb 14 oz)  Body mass index is 28.99 kg/m².    Intake/Output Summary (Last 24 hours) at 05/24/17 1109  Last data filed at 05/24/17 0600   Gross per 24 hour   Intake           1679.7 ml   Output              350 ml   Net           1329.7 ml      Physical Exam   Constitutional: She is oriented to person, place, and time. She appears well-developed and well-nourished. No distress.   HENT:   Head: Normocephalic and atraumatic.   Right Ear: External ear normal.   Left Ear: External ear normal.   Nose: Nose normal.   Mouth/Throat:  Oropharynx is clear and moist.   Eyes: Conjunctivae and EOM are normal. Pupils are equal, round, and reactive to light.   Neck: Normal range of motion. Neck supple.   Cardiovascular: Normal rate, regular rhythm, normal heart sounds and intact distal pulses.    Pulmonary/Chest: Effort normal and breath sounds normal. No respiratory distress. She has no wheezes. She has no rales. She exhibits no tenderness.   Left subclavian port   Abdominal: Soft. Bowel sounds are normal. She exhibits no distension. There is no tenderness.   Musculoskeletal: Normal range of motion. She exhibits tenderness. She exhibits no edema or deformity.   Pain x4 extremities, L arm > R arm; BLE pain greatest just distal to knees   Neurological: She is alert and oriented to person, place, and time.   Skin: Skin is warm and dry. Capillary refill takes less than 2 seconds. She is not diaphoretic.   Psychiatric: She has a normal mood and affect. Her behavior is normal. Judgment and thought content normal.   Nursing note and vitals reviewed.      Significant Labs:   Blood Culture:     Recent Labs  Lab 05/23/17  0012 05/23/17  0233   LABBLOO Gram stain aer bottle: Gram negative rods   Results called to and read back by: Terrence Ewing RN 05/23/2017  17:12  GRAM NEGATIVE RODIdentification and susceptibility pending  No Growth to date  No Growth to date Gram stain aer bottle: Gram negative rods   Positive results previously called 05/23/2017  19:59  GRAM NEGATIVE RODIdentification pendingFor susceptibility see order #[id number]     BMP:     Recent Labs  Lab 05/24/17  0355   *   *   K 3.1*      CO2 21*   BUN 7   CREATININE 0.7   CALCIUM 7.9*   MG 1.3*     CBC:     Recent Labs  Lab 05/23/17  0012 05/24/17  0355   WBC 5.19 4.13   HGB 6.6* 7.4*  7.6*   HCT 19.7* 22.1*   * 114*       Significant Imaging:   Imaging Results          MRI Upper Extremity W WO Contrast Right (Final result)     Abnormal  Result time 05/23/17  19:50:57    Final result by Adilson Vela MD (05/23/17 19:50:57)                 Impression:        Findings most compatible with extensive multifocal osteomyelitis and myositis as above.    Small periarticular abscess involving the lateral aspect of the distal humerus with associated joint effusion concerning for septic joint.    Report has been flagged in the EPIC medical record system.    ______________________________________     Electronically signed by resident: VINICIO PICHARDO MD  Date:     05/23/17  Time:    19:19            As the supervising and teaching physician, I personally reviewed the images and resident's interpretation and I agree with the findings.          Electronically signed by: ADILSON VELA MD  Date:     05/23/17  Time:    19:50              Narrative:    Technique: Multiplanar multisequence MRI of the right forearm before and after the administration of 8 cc of Gadavist intravenous contrast.    Comparison: Radiograph same day.    Findings:    There is diffuse soft tissue edema throughout the right forearm which enhances after the administration of contrast.  These findings are primarily radha-osseous involving the musculature throughout the forearm compatible with myositis.  Examination is somewhat limited secondary to patient motion artifact.  There is a small fluid collection posterior to the lateral aspect of the distal humerus demonstrate peripheral enhancement with central T2 hyperintensity measuring 1.2 cm most compatible with a soft tissue abscess.  There is associated elbow effusion with septic joint not excluded.    There is multifocal areas of cortical destruction throughout the radius involving the proximal and distal metaphyses and diaphysis as well as the mid ulna demonstrate abnormal T1 hypointensity with postcontrast enhancement most compatible with osteomyelitis.  No focal intraosseous fluid collections.                             X-Ray Chest 1 View (Final result)  Result  time 05/23/17 12:23:54    Final result by Yan Haskins MD (05/23/17 12:23:54)                 Impression:     As above.          Electronically signed by: Yan Haskins MD  Date:     05/23/17  Time:    12:23              Narrative:    No prior chest radiographs available.    Vascular catheter enters from a left subclavian approach, its tip in the superior vena cava.  No pneumothorax.  Heart size is normal, as is the appearance of the pulmonary vascularity.  A minimal linear opacity consistent with subsegmental atelectasis or parenchymal scarring is observed in the left lower lung zone, but overall the lung zones are essentially clear and are free of significant airspace consolidation or volume loss.  No pleural fluid.  No hilar or mediastinal mass lesion.  Some sclerosis involving the left humeral head is observed, and reference should be made to the report of an earlier examination of the humeri dated 5/23/17 and 9:22 AM.                             X-Ray Forearm Bilateral (Final result)  Result time 05/23/17 10:11:41   Procedure changed from X-Ray Forearm Left     Final result by Freddy Ko III, MD (05/23/17 10:11:41)                 Impression:     Bilateral forearm bone infarcts.  Infection less likely.      Electronically signed by: FREDDY KO  Date:     05/23/17  Time:    10:11              Narrative:    2 views bilateral.    Right radius demonstrates 2 destructive lesions right ulna one.  Left radius demonstrates one destructive process.  These are most likely bone infarcts.  Osteomyelitis probably less likely.                             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  Infection less likely.      Electronically signed by: FREDDY KO  Date:     05/23/17  Time:    10:00                               X-Ray Tibia Fibula Bilateral (Final result)  Result time 05/23/17 10:01:24    Final result by Freddy Ko III, MD (05/23/17 10:01:24)                 Narrative:    2 views bilateral.    Right: There is a distal femur proximal tibia distal tibia lesion consistent with bone infarcts.    Left: There are multiple tibial bone infarcts in the distal femur bone infarct.  No pathologic fracture seen.      Electronically signed by: FREDDY KO  Date:     05/23/17  Time:    10:01                              X-Ray Femur 2 View Bilateral (Final result)  Result time 05/23/17 10:04:27    Final result by Freddy Ko III, MD (05/23/17 10:04:27)                 Narrative:    2 views bilateral.    Right: There are multiple bone infarcts suspected throughout the right femur.  Also the proximal right tibia.  No fracture dislocation bone destruction seen.    Left: There are multiple bone infarcts suspected throughout the left femur and proximal tibia.  No fracture dislocation bone destruction seen.      Electronically signed by: FREDDY KO  Date:     05/23/17  Time:    10:04                              X-Ray Forearm Right (No Result on File)                   Assessment/Plan:      Gram-negative bacteremia    - Bcx at [facility] with preliminary result of GNR in aerobic & anaerobic bottles    - Serratia, sensitive to ceftriaxone, cipro, cefepime, bactrim. Resistant to cefazolin, intermidate to tobra and cefoxitin.  - repeat blood cx here also with gram negative rods  - ID consulted, appreciate their recs  - potential sources include port and osteomyelitis.    - Gen surg consult for port removal   - patient's MRI right arm with multiple lesions concerning for osteomyelitis, along with possible septic joint     - IR consulted for bone biopsy, planning to perform on 5/25    - ortho consulted for consideration of aspiration of joint fluid  - will consider de-escalation to ceftriaxone today           Acute hematogenous osteomyelitis    MRI right arm with multifocal lesions concerning for osteomyelitis   - patient potentially had osteonecrosis from her sickle cell to begin with, then became bacteremic from her port, which then seeded the previously damaged areas of bone leading to osteomyelitis   - ortho/IR evaluation as above.          Sickle cell crisis    - H/H 6.3/18 at OSH, no improvement with transfusion 1 unit PRBCs   - with hx sickle cell disease, pt reports her hemoglobin is usually around 7    - 7.4 today    - will continue to monitor, but likely not attempt transfusion again unless hemoglobin reaches ~6   - pt does not appear to have acute chest at this time   - pain control, oxygen, IVF   - hem/onc also consulted, appreciate their recs          Diarrhea    c dif negative   - electrolyte abnormalities   - improving slightly, but still a few times a day   - will treat symptomatically with loperamide          Hypophosphatemia    - replacing          Hypomagnesemia    - replacing          Hypokalemia    - replacing   - possibly 2/2 diarrhea. Treatment per diarrhea section        Sepsis    Per gram negative bacteremia section          Tachycardia    Most likely 2/2 sepsis, bacteremia   - Will continue to treat the sepsis          * Osteonecrosis    - BUE pain with evidence of osteonecrosis on xrays concerning for osteomyelitis vs malignancy, vs osteonecrosis 2/2 sickle cell   - obtaining x-rays, ortho consulted    - as above, rec for IR biopsy/aspiration of lesion as next step    - continue to monitor, f/u   - NPO for possible procedures, will f/u with consultants and likely order diet soon          VTE Risk Mitigation     None          Moe Shields MD  Department of Hospital Medicine   [facility]

## 2017-05-24 NOTE — CLINICAL REVIEW
Noted fever yesterday with blood transfusion. Avoid transfusions. Consider if hgb is < 6. Has many antibodies and typing will be more difficult as more antibodies potentially develop with further transfusions.   Osteomyelitis noted on MRI- would treat accordingly  Current pain management regimen seems to be controlling patient's pain so would continue this.  Port will likely need to be removed and temp picc placed.  Please call with questions.     Virginia Mccormick MD   Pager [phone]

## 2017-05-24 NOTE — SUBJECTIVE & OBJECTIVE
Interval History: Overnight, fever to 104, tachycardia, after blood transfusion. This morning, pt reports feeling a little bit better than last night. Still with pain, PRN meds help but don't last very long. Pain still in arms/legs, no CP/abd pain. No SOB/cough, no dysuria. Diarrhea continues, about 2x/day, which is improved from before.    Review of Systems   Constitutional: Positive for chills, fatigue and fever. Negative for appetite change.   HENT: Negative.  Negative for sinus pressure and sore throat.    Eyes: Negative.  Negative for visual disturbance.   Respiratory: Positive for shortness of breath (with exertion). Negative for cough.    Cardiovascular: Negative.  Negative for chest pain and palpitations.   Gastrointestinal: Positive for diarrhea (for 2 weeks, non-bloody). Negative for abdominal pain, nausea and vomiting.   Endocrine: Negative.    Genitourinary: Negative.  Negative for difficulty urinating and dysuria.   Musculoskeletal: Positive for arthralgias and myalgias. Negative for joint swelling.   Skin: Negative.    Allergic/Immunologic: Negative.    Neurological: Negative.  Negative for headaches.   Psychiatric/Behavioral: Negative.      Objective:     Vital Signs (Most Recent):  Temp: 98.8 °F (37.1 °C) (05/24/17 0820)  Pulse: 108 (05/24/17 0820)  Resp: (!) 23 (05/24/17 0400)  BP: 103/72 (05/24/17 0820)  SpO2: (!) 94 % (05/24/17 0820) Vital Signs (24h Range):  Temp:  [98.6 °F (37 °C)-104.1 °F (40.1 °C)] 98.8 °F (37.1 °C)  Pulse:  [] 108  Resp:  [15-25] 23  SpO2:  [91 %-100 %] 94 %  BP: ()/(60-98) 103/72     Weight: 76.6 kg (168 lb 14 oz)  Body mass index is 28.99 kg/m².    Intake/Output Summary (Last 24 hours) at 05/24/17 1109  Last data filed at 05/24/17 0600   Gross per 24 hour   Intake           1679.7 ml   Output              350 ml   Net           1329.7 ml      Physical Exam   Constitutional: She is oriented to person, place, and time. She appears well-developed and  well-nourished. No distress.   HENT:   Head: Normocephalic and atraumatic.   Right Ear: External ear normal.   Left Ear: External ear normal.   Nose: Nose normal.   Mouth/Throat: Oropharynx is clear and moist.   Eyes: Conjunctivae and EOM are normal. Pupils are equal, round, and reactive to light.   Neck: Normal range of motion. Neck supple.   Cardiovascular: Normal rate, regular rhythm, normal heart sounds and intact distal pulses.    Pulmonary/Chest: Effort normal and breath sounds normal. No respiratory distress. She has no wheezes. She has no rales. She exhibits no tenderness.   Left subclavian port   Abdominal: Soft. Bowel sounds are normal. She exhibits no distension. There is no tenderness.   Musculoskeletal: Normal range of motion. She exhibits tenderness. She exhibits no edema or deformity.   Pain x4 extremities, L arm > R arm; BLE pain greatest just distal to knees   Neurological: She is alert and oriented to person, place, and time.   Skin: Skin is warm and dry. Capillary refill takes less than 2 seconds. She is not diaphoretic.   Psychiatric: She has a normal mood and affect. Her behavior is normal. Judgment and thought content normal.   Nursing note and vitals reviewed.      Significant Labs:   Blood Culture:     Recent Labs  Lab 05/23/17  0012 05/23/17  0233   LABBLOO Gram stain aer bottle: Gram negative rods   Results called to and read back by: Terrence Ewing RN 05/23/2017  17:12  GRAM NEGATIVE RODIdentification and susceptibility pending  No Growth to date  No Growth to date Gram stain aer bottle: Gram negative rods   Positive results previously called 05/23/2017  19:59  GRAM NEGATIVE RODIdentification pendingFor susceptibility see order #[id number]     BMP:     Recent Labs  Lab 05/24/17  0355   *   *   K 3.1*      CO2 21*   BUN 7   CREATININE 0.7   CALCIUM 7.9*   MG 1.3*     CBC:     Recent Labs  Lab 05/23/17  0012 05/24/17  0355   WBC 5.19 4.13   HGB 6.6* 7.4*  7.6*    HCT 19.7* 22.1*   * 114*       Significant Imaging:   Imaging Results          MRI Upper Extremity W WO Contrast Right (Final result)     Abnormal  Result time 05/23/17 19:50:57    Final result by Adilson Vela MD (05/23/17 19:50:57)                 Impression:        Findings most compatible with extensive multifocal osteomyelitis and myositis as above.    Small periarticular abscess involving the lateral aspect of the distal humerus with associated joint effusion concerning for septic joint.    Report has been flagged in the [employer] medical record system.    ______________________________________     Electronically signed by resident: VINICIO PICHARDO MD  Date:     05/23/17  Time:    19:19            As the supervising and teaching physician, I personally reviewed the images and resident's interpretation and I agree with the findings.          Electronically signed by: ADILSON VELA MD  Date:     05/23/17  Time:    19:50              Narrative:    Technique: Multiplanar multisequence MRI of the right forearm before and after the administration of 8 cc of Gadavist intravenous contrast.    Comparison: Radiograph same day.    Findings:    There is diffuse soft tissue edema throughout the right forearm which enhances after the administration of contrast.  These findings are primarily radha-osseous involving the musculature throughout the forearm compatible with myositis.  Examination is somewhat limited secondary to patient motion artifact.  There is a small fluid collection posterior to the lateral aspect of the distal humerus demonstrate peripheral enhancement with central T2 hyperintensity measuring 1.2 cm most compatible with a soft tissue abscess.  There is associated elbow effusion with septic joint not excluded.    There is multifocal areas of cortical destruction throughout the radius involving the proximal and distal metaphyses and diaphysis as well as the mid ulna demonstrate abnormal T1 hypointensity  with postcontrast enhancement most compatible with osteomyelitis.  No focal intraosseous fluid collections.                             X-Ray Chest 1 View (Final result)  Result time 05/23/17 12:23:54    Final result by Yan Haskins MD (05/23/17 12:23:54)                 Impression:     As above.          Electronically signed by: Yan Haskins MD  Date:     05/23/17  Time:    12:23              Narrative:    No prior chest radiographs available.    Vascular catheter enters from a left subclavian approach, its tip in the superior vena cava.  No pneumothorax.  Heart size is normal, as is the appearance of the pulmonary vascularity.  A minimal linear opacity consistent with subsegmental atelectasis or parenchymal scarring is observed in the left lower lung zone, but overall the lung zones are essentially clear and are free of significant airspace consolidation or volume loss.  No pleural fluid.  No hilar or mediastinal mass lesion.  Some sclerosis involving the left humeral head is observed, and reference should be made to the report of an earlier examination of the humeri dated 5/23/17 and 9:22 AM.                             X-Ray Forearm Bilateral (Final result)  Result time 05/23/17 10:11:41   Procedure changed from X-Ray Forearm Left     Final result by Freddy Ko III, MD (05/23/17 10:11:41)                 Impression:     Bilateral forearm bone infarcts.  Infection less likely.      Electronically signed by: FREDDY KO  Date:     05/23/17  Time:    10:11              Narrative:    2 views bilateral.    Right radius demonstrates 2 destructive lesions right ulna one.  Left radius demonstrates one destructive process.  These are most likely bone infarcts.  Osteomyelitis probably less likely.                             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  Infection less likely.      Electronically signed by: FREDDY KO  Date:     05/23/17  Time:    10:00                              X-Ray Tibia Fibula Bilateral (Final result)  Result time 05/23/17 10:01:24    Final result by Freddy Ko III, MD (05/23/17 10:01:24)                 Narrative:    2 views bilateral.    Right: There is a distal femur proximal tibia distal tibia lesion consistent with bone infarcts.    Left: There are multiple tibial bone infarcts in the distal femur bone infarct.  No pathologic fracture seen.      Electronically signed by: FREDDY KO  Date:     05/23/17  Time:    10:01                              X-Ray Femur 2 View Bilateral (Final result)  Result time 05/23/17 10:04:27    Final result by Freddy Ko III, MD (05/23/17 10:04:27)                 Narrative:    2 views bilateral.    Right: There are multiple bone infarcts suspected throughout the right femur.  Also the proximal right tibia.  No fracture dislocation bone destruction seen.    Left: There are multiple bone infarcts suspected throughout the left femur and proximal tibia.  No fracture dislocation bone destruction seen.      Electronically signed by: FREDDY KO  Date:     05/23/17  Time:    10:04                              X-Ray Forearm Right (No Result on File)

## 2017-05-24 NOTE — ASSESSMENT & PLAN NOTE
- blood cx from [facility] growing Serratia sensitive to cefepime, rocephin and cipro  - blood cx here growing serratia marscensens  - given lack of clearance of cultures- port needs to be removed  - continue cefepime 2g q 12 hours for now  - needs tissue bx given osteomyelitis

## 2017-05-24 NOTE — PLAN OF CARE
Problem: Patient Care Overview  Goal: Plan of Care Review  Outcome: Ongoing (interventions implemented as appropriate)  Pt remains free from falls and injury. Plan of care reviewed with pt. Pt understands plan. Pt c/o 7/10 pain, VSS. Medication management given. Pt will be NPO @ midnight. Abx therapy continues for gram negative infection. Plans to d/c port placement was also discussed due to possible infection. Will continue to monitor.

## 2017-05-24 NOTE — ASSESSMENT & PLAN NOTE
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

## 2017-05-25 PROBLEM — E83.42 HYPOMAGNESEMIA: Status: RESOLVED | Noted: 2017-05-23 | Resolved: 2017-05-25

## 2017-05-25 PROBLEM — E83.39 HYPOPHOSPHATEMIA: Status: RESOLVED | Noted: 2017-05-23 | Resolved: 2017-05-25

## 2017-05-25 PROBLEM — E87.6 HYPOKALEMIA: Status: RESOLVED | Noted: 2017-05-23 | Resolved: 2017-05-25

## 2017-05-25 LAB
ANION GAP SERPL CALC-SCNC: 5 MMOL/L
ANISOCYTOSIS BLD QL SMEAR: SLIGHT
BACTERIA BLD CULT: NORMAL
BASOPHILS # BLD AUTO: 0 K/UL
BASOPHILS NFR BLD: 0 %
BUN SERPL-MCNC: 8 MG/DL
CALCIUM SERPL-MCNC: 8.3 MG/DL
CHLORIDE SERPL-SCNC: 109 MMOL/L
CO2 SERPL-SCNC: 27 MMOL/L
CREAT SERPL-MCNC: 0.6 MG/DL
DACRYOCYTES BLD QL SMEAR: ABNORMAL
DIFFERENTIAL METHOD: ABNORMAL
EOSINOPHIL # BLD AUTO: 0 K/UL
EOSINOPHIL NFR BLD: 0 %
ERYTHROCYTE [DISTWIDTH] IN BLOOD BY AUTOMATED COUNT: 24.9 %
EST. GFR  (AFRICAN AMERICAN): >60 ML/MIN/1.73 M^2
EST. GFR  (NON AFRICAN AMERICAN): >60 ML/MIN/1.73 M^2
GLUCOSE SERPL-MCNC: 159 MG/DL
GRAM STN SPEC: NORMAL
HCT VFR BLD AUTO: 19.1 %
HGB BLD-MCNC: 6.5 G/DL
HYPOCHROMIA BLD QL SMEAR: ABNORMAL
LDH SERPL L TO P-CCNC: 456 U/L
LYMPHOCYTES # BLD AUTO: 0.8 K/UL
LYMPHOCYTES NFR BLD: 11.6 %
MAGNESIUM SERPL-MCNC: 2.3 MG/DL
MCH RBC QN AUTO: 25 PG
MCHC RBC AUTO-ENTMCNC: 34 %
MCV RBC AUTO: 74 FL
MONOCYTES # BLD AUTO: 0.4 K/UL
MONOCYTES NFR BLD: 5.8 %
NEUTROPHILS # BLD AUTO: 6 K/UL
NEUTROPHILS NFR BLD: 82.2 %
PHOSPHATE SERPL-MCNC: 2.8 MG/DL
PLATELET # BLD AUTO: 149 K/UL
PLATELET BLD QL SMEAR: ABNORMAL
PMV BLD AUTO: 9.2 FL
POIKILOCYTOSIS BLD QL SMEAR: SLIGHT
POLYCHROMASIA BLD QL SMEAR: ABNORMAL
POTASSIUM SERPL-SCNC: 3.5 MMOL/L
RBC # BLD AUTO: 2.6 M/UL
SODIUM SERPL-SCNC: 141 MMOL/L
WBC # BLD AUTO: 7.25 K/UL

## 2017-05-25 PROCEDURE — 36415 COLL VENOUS BLD VENIPUNCTURE: CPT

## 2017-05-25 PROCEDURE — 63600175 PHARM REV CODE 636 W HCPCS: Performed by: INTERNAL MEDICINE

## 2017-05-25 PROCEDURE — 87075 CULTR BACTERIA EXCEPT BLOOD: CPT | Mod: 59

## 2017-05-25 PROCEDURE — 87116 MYCOBACTERIA CULTURE: CPT | Mod: 59

## 2017-05-25 PROCEDURE — 88172 CYTP DX EVAL FNA 1ST EA SITE: CPT | Mod: 26,,, | Performed by: PATHOLOGY

## 2017-05-25 PROCEDURE — 80048 BASIC METABOLIC PNL TOTAL CA: CPT

## 2017-05-25 PROCEDURE — 83735 ASSAY OF MAGNESIUM: CPT

## 2017-05-25 PROCEDURE — 85025 COMPLETE CBC W/AUTO DIFF WBC: CPT

## 2017-05-25 PROCEDURE — 83615 LACTATE (LD) (LDH) ENZYME: CPT

## 2017-05-25 PROCEDURE — 87077 CULTURE AEROBIC IDENTIFY: CPT | Mod: 59

## 2017-05-25 PROCEDURE — 87205 SMEAR GRAM STAIN: CPT | Mod: 91

## 2017-05-25 PROCEDURE — 87070 CULTURE OTHR SPECIMN AEROBIC: CPT | Mod: 59

## 2017-05-25 PROCEDURE — 0R9L3ZX DRAINAGE OF RIGHT ELBOW JOINT, PERCUTANEOUS APPROACH, DIAGNOSTIC: ICD-10-PCS | Performed by: RADIOLOGY

## 2017-05-25 PROCEDURE — 84100 ASSAY OF PHOSPHORUS: CPT

## 2017-05-25 PROCEDURE — 88311 DECALCIFY TISSUE: CPT | Mod: 26,,, | Performed by: PATHOLOGY

## 2017-05-25 PROCEDURE — 88305 TISSUE EXAM BY PATHOLOGIST: CPT | Mod: 26,,, | Performed by: PATHOLOGY

## 2017-05-25 PROCEDURE — 88305 TISSUE EXAM BY PATHOLOGIST: CPT | Performed by: PATHOLOGY

## 2017-05-25 PROCEDURE — 63600175 PHARM REV CODE 636 W HCPCS: Performed by: RADIOLOGY

## 2017-05-25 PROCEDURE — 87186 SC STD MICRODIL/AGAR DIL: CPT

## 2017-05-25 PROCEDURE — 25000003 PHARM REV CODE 250: Performed by: INTERNAL MEDICINE

## 2017-05-25 PROCEDURE — 25000003 PHARM REV CODE 250: Performed by: NURSE PRACTITIONER

## 2017-05-25 PROCEDURE — 87040 BLOOD CULTURE FOR BACTERIA: CPT

## 2017-05-25 PROCEDURE — 0PBH3ZX EXCISION OF RIGHT RADIUS, PERCUTANEOUS APPROACH, DIAGNOSTIC: ICD-10-PCS | Performed by: RADIOLOGY

## 2017-05-25 PROCEDURE — 20600001 HC STEP DOWN PRIVATE ROOM

## 2017-05-25 PROCEDURE — 99232 SBSQ HOSP IP/OBS MODERATE 35: CPT | Mod: GC,,, | Performed by: HOSPITALIST

## 2017-05-25 RX ORDER — FENTANYL CITRATE 50 UG/ML
INJECTION, SOLUTION INTRAMUSCULAR; INTRAVENOUS CODE/TRAUMA/SEDATION MEDICATION
Status: COMPLETED | OUTPATIENT
Start: 2017-05-25 | End: 2017-05-25

## 2017-05-25 RX ORDER — MIDAZOLAM HYDROCHLORIDE 1 MG/ML
INJECTION, SOLUTION INTRAMUSCULAR; INTRAVENOUS CODE/TRAUMA/SEDATION MEDICATION
Status: COMPLETED | OUTPATIENT
Start: 2017-05-25 | End: 2017-05-25

## 2017-05-25 RX ADMIN — MIDAZOLAM 0.5 MG: 1 INJECTION INTRAMUSCULAR; INTRAVENOUS at 08:05

## 2017-05-25 RX ADMIN — ZIPRASIDONE HYDROCHLORIDE 40 MG: 40 CAPSULE ORAL at 11:05

## 2017-05-25 RX ADMIN — HYDROMORPHONE HYDROCHLORIDE 1 MG: 1 INJECTION, SOLUTION INTRAMUSCULAR; INTRAVENOUS; SUBCUTANEOUS at 08:05

## 2017-05-25 RX ADMIN — MIDAZOLAM 0.5 MG: 1 INJECTION INTRAMUSCULAR; INTRAVENOUS at 09:05

## 2017-05-25 RX ADMIN — FENTANYL CITRATE 25 MCG: 50 INJECTION, SOLUTION INTRAMUSCULAR; INTRAVENOUS at 09:05

## 2017-05-25 RX ADMIN — HYDROMORPHONE HYDROCHLORIDE 1 MG: 1 INJECTION, SOLUTION INTRAMUSCULAR; INTRAVENOUS; SUBCUTANEOUS at 01:05

## 2017-05-25 RX ADMIN — HYDROMORPHONE HYDROCHLORIDE 1 MG: 1 INJECTION, SOLUTION INTRAMUSCULAR; INTRAVENOUS; SUBCUTANEOUS at 06:05

## 2017-05-25 RX ADMIN — OXYCODONE HYDROCHLORIDE 15 MG: 5 TABLET ORAL at 01:05

## 2017-05-25 RX ADMIN — CITALOPRAM HYDROBROMIDE 20 MG: 20 TABLET ORAL at 11:05

## 2017-05-25 RX ADMIN — MIDAZOLAM 1 MG: 1 INJECTION INTRAMUSCULAR; INTRAVENOUS at 08:05

## 2017-05-25 RX ADMIN — CLONAZEPAM 1 MG: 0.5 TABLET ORAL at 08:05

## 2017-05-25 RX ADMIN — ZIPRASIDONE HYDROCHLORIDE 40 MG: 40 CAPSULE ORAL at 05:05

## 2017-05-25 RX ADMIN — HYDROMORPHONE HYDROCHLORIDE 1 MG: 1 INJECTION, SOLUTION INTRAMUSCULAR; INTRAVENOUS; SUBCUTANEOUS at 05:05

## 2017-05-25 RX ADMIN — HYDROMORPHONE HYDROCHLORIDE 1 MG: 1 INJECTION, SOLUTION INTRAMUSCULAR; INTRAVENOUS; SUBCUTANEOUS at 11:05

## 2017-05-25 RX ADMIN — FENTANYL CITRATE 50 MCG: 50 INJECTION, SOLUTION INTRAMUSCULAR; INTRAVENOUS at 08:05

## 2017-05-25 RX ADMIN — HYDROMORPHONE HYDROCHLORIDE 1 MG: 1 INJECTION, SOLUTION INTRAMUSCULAR; INTRAVENOUS; SUBCUTANEOUS at 02:05

## 2017-05-25 RX ADMIN — FENTANYL CITRATE 25 MCG: 50 INJECTION, SOLUTION INTRAMUSCULAR; INTRAVENOUS at 08:05

## 2017-05-25 RX ADMIN — BUSPIRONE HYDROCHLORIDE 15 MG: 5 TABLET ORAL at 01:05

## 2017-05-25 RX ADMIN — CEFEPIME HYDROCHLORIDE 2 G: 2 INJECTION, SOLUTION INTRAVENOUS at 06:05

## 2017-05-25 RX ADMIN — BUSPIRONE HYDROCHLORIDE 15 MG: 5 TABLET ORAL at 10:05

## 2017-05-25 RX ADMIN — CLONAZEPAM 1 MG: 0.5 TABLET ORAL at 11:05

## 2017-05-25 RX ADMIN — MORPHINE SULFATE 30 MG: 30 TABLET, EXTENDED RELEASE ORAL at 08:05

## 2017-05-25 RX ADMIN — MORPHINE SULFATE 30 MG: 30 TABLET, EXTENDED RELEASE ORAL at 11:05

## 2017-05-25 RX ADMIN — BUSPIRONE HYDROCHLORIDE 15 MG: 5 TABLET ORAL at 06:05

## 2017-05-25 NOTE — PROCEDURES
Radiology Post-Procedure Note    Pre Op Diagnosis: right radius lesion and right elbow effusion    Post Op Diagnosis:  Same    Procedure:right radius bone biopsy; right elbow aspiration/lavage    Procedure performed by: Antwon Johnson MD    Written Informed Consent Obtained: Yes    Specimen Removed: YES 3 bone biopsy cores; 5 mL right elbow lavage    Estimated Blood Loss: Minimal    Findings: Permeative destructive lesion throughout the right radius and ulna, most suggestive of osteomyelitis. Small right elbow effusion.  Specimen sent to pathology.   Additional specimen sent to lab: Yes  Patient tolerated procedure well.    Antwon Johnson MD  Staff Radiologist  Department of Radiology  Pager: [phone]

## 2017-05-25 NOTE — NURSING TRANSFER
Nursing Transfer Note      5/25/2017     Transfer To: [id number]    Transfer via stretcher    Transported by [facility] (transporter)    Medicines sent: none    Chart send with patient: Yes

## 2017-05-25 NOTE — H&P
Inpatient Radiology Pre-procedure Note    History of Present Illness:  Yamila Lan is a 46 y.o. female who presents for Bone biopsy and possible fluid collection aspiration of the right arm.    Admission H&P reviewed.  Past Medical History:   Diagnosis Date    Hypertension     Sickle cell anemia      Past Surgical History:   Procedure Laterality Date     SECTION      LEG SURGERY      PORTACATH PLACEMENT      portacath removal      TUBAL LIGATION         Review of Systems:   As documented in primary team H&P    Home Meds:   Prior to Admission medications    Medication Sig Start Date End Date Taking? Authorizing Provider   busPIRone (BUSPAR) 15 MG tablet Take 15 mg by mouth 3 (three) times daily. 3-4 x/day   Yes Historical Provider, MD   citalopram (CELEXA) 20 MG tablet Take 20 mg by mouth once daily.   Yes Historical Provider, MD   clonazePAM (KLONOPIN) 1 MG tablet Take 1 mg by mouth 2 (two) times daily.   Yes Historical Provider, MD   lubiprostone (AMITIZA) 24 MCG Cap Take 24 mcg by mouth 2 (two) times daily with meals.   Yes Historical Provider, MD   morphine (MSIR) 30 MG tablet Take 30 mg by mouth 2 (two) times daily as needed for Pain.   Yes Historical Provider, MD   oxycodone (ROXICODONE) 5 MG immediate release tablet Take 5 mg by mouth every 4 (four) hours as needed for Pain.   Yes Historical Provider, MD   potassium chloride SA (K-DUR,KLOR-CON) 10 MEQ tablet Take 10 mEq by mouth 2 (two) times daily.   Yes Historical Provider, MD   ziprasidone (GEODON) 40 MG Cap Take 40 mg by mouth 2 (two) times daily with meals.   Yes Historical Provider, MD   carvedilol (COREG) 6.25 MG tablet Take 6.25 mg by mouth 2 (two) times daily with meals.    Historical Provider, MD   cyclobenzaprine (FLEXERIL) 5 MG tablet Take 10 mg by mouth once daily.     Historical Provider, MD   zolpidem (AMBIEN) 5 MG Tab Take 5 mg by mouth nightly as needed.    Historical Provider, MD     Scheduled Meds:    busPIRone  15 mg Oral  "TID    ceFEPime (MAXIPIME) IVPB  2 g Intravenous Q12H    citalopram  20 mg Oral Daily    clonazePAM  1 mg Oral BID    lubiprostone  24 mcg Oral BID WM    morphine  30 mg Oral Q12H    senna-docusate 8.6-50 mg  1 tablet Oral BID    ziprasidone  40 mg Oral BID WM     Continuous Infusions:    PRN Meds:sodium chloride, acetaminophen, cyclobenzaprine, HYDROmorphone, loperamide, ondansetron, ondansetron, oxycodone, oxycodone, zolpidem  Anticoagulants/Antiplatelets: no anticoagulation    Allergies:   Review of patient's allergies indicates:   Allergen Reactions    Floxin [ofloxacin]     Folic acid containing drugs     Toradol [ketorolac]      Sedation Hx: have not been any systemic reactions    Labs:    Recent Labs  Lab 05/23/17  0234   INR 1.3*       Recent Labs  Lab 05/24/17  0355 05/25/17  0639   WBC 4.13 7.25   HGB 7.4*  7.6* 6.5*   HCT 22.1* 19.1*   MCV 74* 74*   *  --       Recent Labs  Lab 05/23/17  0012  05/24/17  0355 05/25/17  0639   GLU 92  < > 141* 159*     < > 135* 141   K 2.8*  < > 3.1* 3.5     < > 107 109   CO2 24  < > 21* 27   BUN 10  < > 7 8   CREATININE 0.6  < > 0.7 0.6   CALCIUM 8.4*  < > 7.9* 8.3*   MG 1.4*  < > 1.3* 2.3   ALT 23  --   --   --    AST 39  --   --   --    ALBUMIN 2.0*  --   --   --    BILITOT 1.3*  --  1.8*  --    BILIDIR  --   --  0.9*  --    < > = values in this interval not displayed.      Vitals:  Temp: 97.4 °F (36.3 °C) (05/24/17 2000)  Pulse: 75 (05/25/17 0700)  Resp: 14 (05/25/17 0334)  BP: 96/67 (05/25/17 0334)  SpO2: 96 % (05/25/17 0332)     Physical Exam:  ASA: 2  Mallampati: 2    General: no acute distress  Mental Status: alert and oriented to person, place and time  HEENT: normocephalic, atraumatic  Chest: unlabored breathing  Heart: regular heart rate  Abdomen: nondistended  Extremity: moves all extremities    Plan: CT guided bone biopsy and possible aspiration of fluid collection   Sedation Plan: jan Sosa MD ([facility])  Radiology " PGY-3  [phone]

## 2017-05-25 NOTE — PROGRESS NOTES
Patient not seen secondary to being in a procedure. Port removed today. IR biopsied bone today. Will follow from af[place].     1. Serratia growing on our cultures- pan sensitive. Will continue cefepime until cultures are cleared and will likely switch to cipro following. Would follow cultures until they are cleared.     2. Will follow up bone biopsy/culture.

## 2017-05-25 NOTE — PROGRESS NOTES
"[facility] Hospital Medicine  Progress Note    Patient Name: Yamila Lan  MRN: [id number]  Patient Class: IP- Inpatient   Admission Date: 5/22/2017  Length of Stay: 3 days  Attending Physician: Edgardo Avila MD  Primary Care Provider: Silvana Dhaliwal MD    [facility] Medicine Team: [facility] HOSP MED L Moe Shields MD    Subjective:     Principal Problem:Osteonecrosis    HPI:  Patient is a 46 y.o. female with significant past medical history of HTN and sickle cell dz was transferred from [facility]. Pt had originally presented to the hospital c/o sickle cell crisis; specifically bilateral arm pain (L > R) for two weeks. Pt thought pain may have been related to a fall she sustained approximately 1 month ago.  She also endorses h/o fever, chills and body aches. While admitted the pt began having fevers and Bcx grew out GNR (initally tx with cefepime, now on meropenem). BUE xrays are showing periosteal dz concerning for possible osteomyelitis vs metastatic dz. Prior to admission pt had port in place 2/2 poor venous access.     Latest labs at OSH revealed WBC 7.1, anemia (H/H 6.3/19), retic count 4.7, mild hypokalemia (K 3.1), mild hypomagnesemia (Mag 1.5), ESR & CRP elevated (118 and 103 respectively), lactic 1.1; INR 1.4, UA neg for infection, UPT neg. Bcx preliminary results showing GNR in both aerobic and anaerobic bottles. Xray LUE  showed "osseous erosive lesions within the mid humeral diaphysis bone marrow with additional destructive lesions noted in the proximal radial diaphysis. Shoulder and elbow joints intact. Findings concerning for metastatic disease to humerus and proximal radius." Xray RUE revealed "destructive mauro lesion within mid humeral diaphyseal bone marrow. Destructive lesions additional seening involiving radius & ulna. Should and elbow joints intact." Pt also had MRI of left shoulder performed revealing changes consistent with "early osteonecrosis along superior " "and medial aspect of left humeral head."  CXRY was neg for acute cardiopulmonary process. Pt was originally supposed to be transfused 3 units PRBCs, however pt has h/o antibodies with difficulty matching blood and had a questionable reaction to one of the units. It is unclear from chart how many units were received. Per OSH records, the pt's port was d/c'd 2/2 bacteremia and PICC was placed, however it appears that this did not occur and pt's port is still in place. L shoulder arthrocentesis was performed, gram stain without organisms or WBCs.       Hospital Course:  Patient admitted to hospital medicine with infectious disease, orthopedics, and hematology/oncology consults. Contacted OSH for further culture results, patient is growing serratia, sensitive to cipro, ceftriaxone, cefepime, bactrim, amikacin. Resistant to cefazolin. Intermediate to cefoxitin, tobra.  5/23: Pt had xray, then MRI of right forearm with concern for possible multi-focal osteomyelitis, possible septic joint. Repeat blood cultures drawn here growing gram negative rods. Fever and tachycardia overnight around the time of blood transfusion.  5/24: Since repeat blood cultures still +, general surgery was consulted and removed the patient's port. Sensitivities here result as pan-sensitive serratia  5/25: IR performed bone biopsy of osteomyelitis as well as joint aspiration of right elbow. In addition to those cultures, repeat blood cultures were drawn to assess for clearance.      Interval History: NAEON. Port removed yesterday evening with gen surg after midline placed. This morning, pt reports feeling okay. No fevers/chills, no CP/SOB, no nausea/vomit/dysuria. Still some diarrhea, but improving. Still pain, arms/legs, requesting more frequent pain medication. Reports she sometimes gets dilaudid q2 hours at other hospitals. Otherwise, patient is wondering about culture results, and when she will be able to go home.    Review of Systems "   Constitutional: Positive for chills, fatigue and fever. Negative for appetite change.   HENT: Negative.  Negative for sinus pressure and sore throat.    Eyes: Negative.  Negative for visual disturbance.   Respiratory: Positive for shortness of breath (with exertion). Negative for cough.    Cardiovascular: Negative.  Negative for chest pain and palpitations.   Gastrointestinal: Positive for diarrhea (for 2 weeks, non-bloody). Negative for abdominal pain, nausea and vomiting.   Endocrine: Negative.    Genitourinary: Negative.  Negative for difficulty urinating and dysuria.   Musculoskeletal: Positive for arthralgias and myalgias. Negative for joint swelling.   Skin: Negative.    Allergic/Immunologic: Negative.    Neurological: Negative.  Negative for headaches.   Psychiatric/Behavioral: Negative.      Objective:     Vital Signs (Most Recent):  Temp: 98 °F (36.7 °C) (05/25/17 1025)  Pulse: 88 (05/25/17 1100)  Resp: 18 (05/25/17 1054)  BP: 107/76 (05/25/17 1054)  SpO2: 100 % (05/25/17 1054) Vital Signs (24h Range):  Temp:  [97.4 °F (36.3 °C)-98 °F (36.7 °C)] 98 °F (36.7 °C)  Pulse:  [] 88  Resp:  [13-20] 18  SpO2:  [95 %-100 %] 100 %  BP: ()/(58-91) 107/76     Weight: 76.6 kg (168 lb 14 oz)  Body mass index is 28.99 kg/m².    Intake/Output Summary (Last 24 hours) at 05/25/17 1145  Last data filed at 05/25/17 0438   Gross per 24 hour   Intake             1020 ml   Output             2350 ml   Net            -1330 ml      Physical Exam   Constitutional: She is oriented to person, place, and time. She appears well-developed and well-nourished. No distress.   HENT:   Head: Normocephalic and atraumatic.   Eyes: EOM are normal. Pupils are equal, round, and reactive to light.   Neck: Normal range of motion. Neck supple.   Cardiovascular: Normal rate, regular rhythm, normal heart sounds and intact distal pulses.    Pulmonary/Chest: Effort normal and breath sounds normal. No respiratory distress. She has no  wheezes. She has no rales. She exhibits no tenderness.   S/p port removal from left subclavian   Abdominal: Soft. Bowel sounds are normal. She exhibits no distension. There is no tenderness.   Musculoskeletal: Normal range of motion. She exhibits tenderness. She exhibits no edema or deformity.   Neurological: She is alert and oriented to person, place, and time.   Skin: Skin is warm and dry. She is not diaphoretic.   Psychiatric: Her behavior is normal.   Nursing note and vitals reviewed.      Significant Labs:   Blood Culture:   No results for input(s): LABBLOO in the last 48 hours.  BMP:     Recent Labs  Lab 05/25/17  0639   *      K 3.5      CO2 27   BUN 8   CREATININE 0.6   CALCIUM 8.3*   MG 2.3     CBC:     Recent Labs  Lab 05/24/17  0355 05/25/17  0639   WBC 4.13 7.25   HGB 7.4*  7.6* 6.5*   HCT 22.1* 19.1*   * 149*       Significant Imaging:   Imaging Results          IR Abscess Aspiration (In process)                MRI Upper Extremity W WO Contrast Right (Final result)     Abnormal  Result time 05/23/17 19:50:57    Final result by Adilson Vela MD (05/23/17 19:50:57)                 Impression:        Findings most compatible with extensive multifocal osteomyelitis and myositis as above.    Small periarticular abscess involving the lateral aspect of the distal humerus with associated joint effusion concerning for septic joint.    Report has been flagged in the EPIC medical record system.    ______________________________________     Electronically signed by resident: VINICIO PICHARDO MD  Date:     05/23/17  Time:    19:19            As the supervising and teaching physician, I personally reviewed the images and resident's interpretation and I agree with the findings.          Electronically signed by: ADILSON VELA MD  Date:     05/23/17  Time:    19:50              Narrative:    Technique: Multiplanar multisequence MRI of the right forearm before and after the administration of 8  cc of Gadavist intravenous contrast.    Comparison: Radiograph same day.    Findings:    There is diffuse soft tissue edema throughout the right forearm which enhances after the administration of contrast.  These findings are primarily radha-osseous involving the musculature throughout the forearm compatible with myositis.  Examination is somewhat limited secondary to patient motion artifact.  There is a small fluid collection posterior to the lateral aspect of the distal humerus demonstrate peripheral enhancement with central T2 hyperintensity measuring 1.2 cm most compatible with a soft tissue abscess.  There is associated elbow effusion with septic joint not excluded.    There is multifocal areas of cortical destruction throughout the radius involving the proximal and distal metaphyses and diaphysis as well as the mid ulna demonstrate abnormal T1 hypointensity with postcontrast enhancement most compatible with osteomyelitis.  No focal intraosseous fluid collections.                             X-Ray Chest 1 View (Final result)  Result time 05/23/17 12:23:54    Final result by Yan Haskins MD (05/23/17 12:23:54)                 Impression:     As above.          Electronically signed by: Yan Haskins MD  Date:     05/23/17  Time:    12:23              Narrative:    No prior chest radiographs available.    Vascular catheter enters from a left subclavian approach, its tip in the superior vena cava.  No pneumothorax.  Heart size is normal, as is the appearance of the pulmonary vascularity.  A minimal linear opacity consistent with subsegmental atelectasis or parenchymal scarring is observed in the left lower lung zone, but overall the lung zones are essentially clear and are free of significant airspace consolidation or volume loss.  No pleural fluid.  No hilar or mediastinal mass lesion.  Some sclerosis involving the left humeral head is observed, and reference should be made to the report of an earlier examination of  the humeri dated 5/23/17 and 9:22 AM.                             X-Ray Forearm Bilateral (Final result)  Result time 05/23/17 10:11:41   Procedure changed from X-Ray Forearm Left     Final result by Freddy Ko III, MD (05/23/17 10:11:41)                 Impression:     Bilateral forearm bone infarcts.  Infection less likely.      Electronically signed by: FREDDY KO  Date:     05/23/17  Time:    10:11              Narrative:    2 views bilateral.    Right radius demonstrates 2 destructive lesions right ulna one.  Left radius demonstrates one destructive process.  These are most likely bone infarcts.  Osteomyelitis probably less likely.                             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  Infection less likely.      Electronically signed by: FREDDY KO  Date:     05/23/17  Time:    10:00                              X-Ray Tibia Fibula Bilateral (Final result)  Result time 05/23/17 10:01:24    Final result by Freddy Ko III, MD (05/23/17 10:01:24)                 Narrative:    2 views bilateral.    Right: There is a distal femur proximal tibia distal tibia lesion consistent with bone infarcts.    Left: There are multiple tibial bone infarcts in the distal femur bone infarct.  No pathologic fracture seen.      Electronically signed by: FREDDY KO  Date:     05/23/17  Time:    10:01                              X-Ray Femur 2 View Bilateral (Final result)  Result time 05/23/17 10:04:27    Final result by Freddy Ko III, MD (05/23/17 10:04:27)                 Narrative:    2 views bilateral.    Right: There are multiple bone infarcts suspected throughout the right femur.  Also the proximal right tibia.  No fracture dislocation bone destruction  seen.    Left: There are multiple bone infarcts suspected throughout the left femur and proximal tibia.  No fracture dislocation bone destruction seen.      Electronically signed by: TAMIA KO  Date:     05/23/17  Time:    10:04                              X-Ray Forearm Right (No Result on File)                   Assessment/Plan:      Gram-negative bacteremia    - Bcx at [facility] with preliminary result of GNR in aerobic & anaerobic bottles    - Serratia, sensitive to ceftriaxone, cipro, cefepime, bactrim. Resistant to cefazolin, intermidate to tobra and cefoxitin.  - repeat blood cx here also with serratia, pan-sensitive  - ID consulted, appreciate their recs  - potential sources include port and osteomyelitis.    - s/p port removal 5/24 with gen surg, appreciate their assistance   - patient's MRI right arm with multiple lesions concerning for osteomyelitis, along with possible septic joint     - s/p IR bone biopsy and joint aspiration 5/25  - Many cultures still pending - repeat blood cx, bone cx, joint fluid culture, port tip culture        Acute hematogenous osteomyelitis    Xray bilateral upper extremity with multiple abnormalities, R>L, f/u MRI right arm with multifocal lesions concerning for osteomyelitis   - Initial theory is that patient potentially had osteonecrosis from her sickle cell to begin with, then became bacteremic from her port, which then seeded the previously damaged areas of bone leading to osteomyelitis   - appreciate ortho consult   - management per gram negative bacteremia section          Sickle cell crisis    - H/H 6.3/18 at OSH, no improvement with transfusion 1 unit PRBCs   - with hx sickle cell disease, pt reports her hemoglobin is usually around 7    - 7.4 yesterday, down to 6.5 today    - will continue to monitor, but likely not attempt transfusion again unless hemoglobin reaches ~6 (or worsening SOB, or CP)   - pt does not appear to have acute chest at this time   - pain control  (pt requests q2 hours IV dilaudid, however in the afternoons patient tends to become somnolent, will not increase IV pain medication dose), oxygen PRN, IVF   - hem/onc also consulted, appreciate their recs          Diarrhea    c dif negative   - electrolyte abnormalities   - improving slightly, but still a few times a day   - will treat symptomatically with loperamide          Sepsis    Per gram negative bacteremia section          Tachycardia    Most likely 2/2 sepsis, bacteremia   - Will continue to treat the sepsis          * Osteonecrosis    - BUE pain with evidence of osteonecrosis on xrays concerning for osteomyelitis vs malignancy, vs osteonecrosis 2/2 sickle cell   - MRI concerning for osteomyelitis   - management per above          VTE Risk Mitigation     None        Dispo: Pending clearance of blood cultures, results from bone to determine abx duration, PICC placement and HH for IV abx. Likely early/middle of next week.    Moe Shields MD  Department of Hospital Medicine   [facility]

## 2017-05-25 NOTE — PROCEDURES
DATE: 5/24/17.    PREOPERATIVE DIAGNOSIS: Bacteremia.    POSTOPERATIVE DIAGNOSIS: Bacteremia.    PROCEDURE: Left chest port removal.    ATTENDING SURGEON: Jewel Boyd MD.    RESIDENT: Uday Anthony MD.    DETAIL: Left chest prepped and draped. Local anesthetic was applied. Skin was incised with scalpel to the level of the port capsule. Port capsule widely opened with scissors. Port was mobilized from its pocket. Catheter was easily removed, intact, while patient exhaled. Hemostasis was confirmed. Skin closed with interrupted 3-0 Monocryl. Dressing was applied. Patient tolerated well.    EBL: Minimal.    FINDINGS: Left chest port removed, intact, sent for culture.    COMPLICATIONS: None.    Uday Anthony MD  General Surgery  PGY-V  [phone]

## 2017-05-25 NOTE — SUBJECTIVE & OBJECTIVE
Interval History: NAEON. Port removed yesterday evening with gen surg after midline placed. This morning, pt reports feeling okay. No fevers/chills, no CP/SOB, no nausea/vomit/dysuria. Still some diarrhea, but improving. Still pain, arms/legs, requesting more frequent pain medication. Reports she sometimes gets dilaudid q2 hours at other hospitals. Otherwise, patient is wondering about culture results, and when she will be able to go home.    Review of Systems   Constitutional: Positive for chills, fatigue and fever. Negative for appetite change.   HENT: Negative.  Negative for sinus pressure and sore throat.    Eyes: Negative.  Negative for visual disturbance.   Respiratory: Positive for shortness of breath (with exertion). Negative for cough.    Cardiovascular: Negative.  Negative for chest pain and palpitations.   Gastrointestinal: Positive for diarrhea (for 2 weeks, non-bloody). Negative for abdominal pain, nausea and vomiting.   Endocrine: Negative.    Genitourinary: Negative.  Negative for difficulty urinating and dysuria.   Musculoskeletal: Positive for arthralgias and myalgias. Negative for joint swelling.   Skin: Negative.    Allergic/Immunologic: Negative.    Neurological: Negative.  Negative for headaches.   Psychiatric/Behavioral: Negative.      Objective:     Vital Signs (Most Recent):  Temp: 98 °F (36.7 °C) (05/25/17 1025)  Pulse: 88 (05/25/17 1100)  Resp: 18 (05/25/17 1054)  BP: 107/76 (05/25/17 1054)  SpO2: 100 % (05/25/17 1054) Vital Signs (24h Range):  Temp:  [97.4 °F (36.3 °C)-98 °F (36.7 °C)] 98 °F (36.7 °C)  Pulse:  [] 88  Resp:  [13-20] 18  SpO2:  [95 %-100 %] 100 %  BP: ()/(58-91) 107/76     Weight: 76.6 kg (168 lb 14 oz)  Body mass index is 28.99 kg/m².    Intake/Output Summary (Last 24 hours) at 05/25/17 1145  Last data filed at 05/25/17 0438   Gross per 24 hour   Intake             1020 ml   Output             2350 ml   Net            -1330 ml      Physical Exam    Constitutional: She is oriented to person, place, and time. She appears well-developed and well-nourished. No distress.   HENT:   Head: Normocephalic and atraumatic.   Eyes: EOM are normal. Pupils are equal, round, and reactive to light.   Neck: Normal range of motion. Neck supple.   Cardiovascular: Normal rate, regular rhythm, normal heart sounds and intact distal pulses.    Pulmonary/Chest: Effort normal and breath sounds normal. No respiratory distress. She has no wheezes. She has no rales. She exhibits no tenderness.   S/p port removal from left subclavian   Abdominal: Soft. Bowel sounds are normal. She exhibits no distension. There is no tenderness.   Musculoskeletal: Normal range of motion. She exhibits tenderness. She exhibits no edema or deformity.   Neurological: She is alert and oriented to person, place, and time.   Skin: Skin is warm and dry. She is not diaphoretic.   Psychiatric: Her behavior is normal.   Nursing note and vitals reviewed.      Significant Labs:   Blood Culture:   No results for input(s): LABBLOO in the last 48 hours.  BMP:     Recent Labs  Lab 05/25/17  0639   *      K 3.5      CO2 27   BUN 8   CREATININE 0.6   CALCIUM 8.3*   MG 2.3     CBC:     Recent Labs  Lab 05/24/17  0355 05/25/17  0639   WBC 4.13 7.25   HGB 7.4*  7.6* 6.5*   HCT 22.1* 19.1*   * 149*       Significant Imaging:   Imaging Results          IR Abscess Aspiration (In process)                MRI Upper Extremity W WO Contrast Right (Final result)     Abnormal  Result time 05/23/17 19:50:57    Final result by Adilson Vela MD (05/23/17 19:50:57)                 Impression:        Findings most compatible with extensive multifocal osteomyelitis and myositis as above.    Small periarticular abscess involving the lateral aspect of the distal humerus with associated joint effusion concerning for septic joint.    Report has been flagged in the EPIC medical record  system.    ______________________________________     Electronically signed by resident: VINICIO PICHARDO MD  Date:     05/23/17  Time:    19:19            As the supervising and teaching physician, I personally reviewed the images and resident's interpretation and I agree with the findings.          Electronically signed by: FREDY MURRAY MD  Date:     05/23/17  Time:    19:50              Narrative:    Technique: Multiplanar multisequence MRI of the right forearm before and after the administration of 8 cc of Gadavist intravenous contrast.    Comparison: Radiograph same day.    Findings:    There is diffuse soft tissue edema throughout the right forearm which enhances after the administration of contrast.  These findings are primarily radha-osseous involving the musculature throughout the forearm compatible with myositis.  Examination is somewhat limited secondary to patient motion artifact.  There is a small fluid collection posterior to the lateral aspect of the distal humerus demonstrate peripheral enhancement with central T2 hyperintensity measuring 1.2 cm most compatible with a soft tissue abscess.  There is associated elbow effusion with septic joint not excluded.    There is multifocal areas of cortical destruction throughout the radius involving the proximal and distal metaphyses and diaphysis as well as the mid ulna demonstrate abnormal T1 hypointensity with postcontrast enhancement most compatible with osteomyelitis.  No focal intraosseous fluid collections.                             X-Ray Chest 1 View (Final result)  Result time 05/23/17 12:23:54    Final result by Yan Haskins MD (05/23/17 12:23:54)                 Impression:     As above.          Electronically signed by: Yan Haskins MD  Date:     05/23/17  Time:    12:23              Narrative:    No prior chest radiographs available.    Vascular catheter enters from a left subclavian approach, its tip in the superior vena cava.  No pneumothorax.  Heart  size is normal, as is the appearance of the pulmonary vascularity.  A minimal linear opacity consistent with subsegmental atelectasis or parenchymal scarring is observed in the left lower lung zone, but overall the lung zones are essentially clear and are free of significant airspace consolidation or volume loss.  No pleural fluid.  No hilar or mediastinal mass lesion.  Some sclerosis involving the left humeral head is observed, and reference should be made to the report of an earlier examination of the humeri dated 5/23/17 and 9:22 AM.                             X-Ray Forearm Bilateral (Final result)  Result time 05/23/17 10:11:41   Procedure changed from X-Ray Forearm Left     Final result by Freddy Ko III, MD (05/23/17 10:11:41)                 Impression:     Bilateral forearm bone infarcts.  Infection less likely.      Electronically signed by: FREDDY KO  Date:     05/23/17  Time:    10:11              Narrative:    2 views bilateral.    Right radius demonstrates 2 destructive lesions right ulna one.  Left radius demonstrates one destructive process.  These are most likely bone infarcts.  Osteomyelitis probably less likely.                             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  Infection less likely.      Electronically signed by: FREDDY KO  Date:     05/23/17  Time:    10:00                              X-Ray Tibia Fibula Bilateral (Final result)  Result time 05/23/17 10:01:24    Final result by Freddy Ko III, MD (05/23/17 10:01:24)                 Narrative:    2 views bilateral.    Right: There is a distal femur proximal tibia distal tibia lesion consistent with bone infarcts.    Left: There are multiple tibial bone infarcts in the distal femur  bone infarct.  No pathologic fracture seen.      Electronically signed by: FREDDY OK  Date:     05/23/17  Time:    10:01                              X-Ray Femur 2 View Bilateral (Final result)  Result time 05/23/17 10:04:27    Final result by Freddy Ko III, MD (05/23/17 10:04:27)                 Narrative:    2 views bilateral.    Right: There are multiple bone infarcts suspected throughout the right femur.  Also the proximal right tibia.  No fracture dislocation bone destruction seen.    Left: There are multiple bone infarcts suspected throughout the left femur and proximal tibia.  No fracture dislocation bone destruction seen.      Electronically signed by: FREDDY KO  Date:     05/23/17  Time:    10:04                              X-Ray Forearm Right (No Result on File)

## 2017-05-25 NOTE — PROGRESS NOTES
Patient seen and examined. No AEON. Port removal site looks c/d/i.   -Can remove dressing and OK to shower tomorrow.  -Do not submerge in water for 2 weeks  -No formal follow-up needed. Stitches will fall out on own.  -Patient is not candidate for port replacement until arm necrosis is better addressed or resolved.  -Call with questions.  Uday Anthony MD  General Surgery  PGY-V  [phone]

## 2017-05-25 NOTE — PROGRESS NOTES
Pt arrive to the IR dept for  Bone biopsy and possible fluid collection aspiration of the right arm.

## 2017-05-25 NOTE — ASSESSMENT & PLAN NOTE
- Bcx at [facility] with preliminary result of GNR in aerobic & anaerobic bottles    - Serratia, sensitive to ceftriaxone, cipro, cefepime, bactrim. Resistant to cefazolin, intermidate to tobra and cefoxitin.  - repeat blood cx here also with serratia, pan-sensitive  - ID consulted, appreciate their recs  - potential sources include port and osteomyelitis.    - s/p port removal 5/24 with gen surg, appreciate their assistance   - patient's MRI right arm with multiple lesions concerning for osteomyelitis, along with possible septic joint     - s/p IR bone biopsy and joint aspiration 5/25  - Many cultures still pending - repeat blood cx, bone cx, joint fluid culture, port tip culture

## 2017-05-25 NOTE — PLAN OF CARE
Problem: Patient Care Overview  Goal: Plan of Care Review  Outcome: Ongoing (interventions implemented as appropriate)  Pt denies Chest pain, SOB or nausea. C/o pain on Left upper hand; prn dilaudid and oxycodone given timely. No falls, trauma or injury noted. VSS. Plan of care reviewed with patient. No further questions at this time. No significant events. Will continue to monitor.

## 2017-05-25 NOTE — MEDICAL/APP STUDENT
[facility]  Infectious Disease  Progress Note    Patient Name: Yamila Lan  MRN: [id number]  Admission Date: 5/22/2017  Length of Stay: 3 days  Attending Physician: Edgardo Avila MD  Primary Care Provider: Silvana Dhaliwal MD    Isolation Status: No active isolations     Assessment/Plan:      Active Diagnoses:    Diagnosis Date Noted POA    PRINCIPAL PROBLEM:  Osteonecrosis [M87.9] 05/23/2017 Yes    Diarrhea [R19.7] 05/24/2017 Yes    Tachycardia [R00.0] 05/24/2017 Unknown    Sepsis [A41.9] 05/24/2017 Yes    Acute hematogenous osteomyelitis [M86.00]  Unknown    Pyogenic arthritis of right wrist [M00.9]  Unknown    Gram-negative bacteremia [R78.81] 05/23/2017 Yes    Sickle cell crisis [D57.00] 05/22/2017 Yes      Problems Resolved During this Admission:    Diagnosis Date Noted Date Resolved POA    Hypokalemia [E87.6] 05/23/2017 05/25/2017 Yes    Hypomagnesemia [E83.42] 05/23/2017 05/25/2017 Yes    Hypophosphatemia [E83.39] 05/23/2017 05/25/2017 Yes     ##Gram Negative Bacteremia  -[facility] blood cx growing serratia sensitive to cefepime, rocephin and cipro  -Port removed 5/24  -Blood Cx and Port Cx here: serratia pan sensitive  -Repeat blood cultures drawn 5/25  -Continue cefepime 2g q 12 hours     ##Osteomyelitis  -Concern for osteomyelitis vs septoc joint 2/2 imaging and possible seeding of osteonecrotic bone in setting of bacteremia  -Bone biopsy as well as joint aspiration of right elbow completed 5/25  -Labs and cytology pending  -5/25 Joint Fluid from Elbow, Right:    Gram Stain Result Moderate WBC's     No organisms seen   -5/25 Joint Fluid from Elbow, Right #2:   Gram Stain Result Rare WBC's      No organisms seen       Thank you for your consult. ID team will continue to follow.    Ioana Vallejo  Infectious Disease  [facility]    Subjective:     Principal Problem:Osteonecrosis    Interval History:   Pt seen after bone biopsy which she narrates as a traumatic and  painful experience. Otherwise, she feels she is doing okay; her pain in her leg and arms is less than it was when initially admitted to hospital. She denies overnight feer, chills, or sweats. Port removed yesterday. Midline placed in right brachial vein.    HPI:  45 yo F w/ PMH of HTN and SCA who presented to OSH ([facility]) with sickle cell crisis pain in legs (pain developing over several months) and arms (pain developing over past 2 months, L>R). Pt did have a fall a month ago as well during which she used forearms to brace herself.       Pt had fevers during admit and blood cx revealed GNR. She was originally started on cefepime for bacteremia, but is now on meropenem. Pt endorses fevers, chills, night sweats at home for past 3 weeks, though not unusual she says for her during a crisis. Also endorses 15lb unintentional  wt loss over past 4 months.  BLUE X rays were concerning for erosive bone process with osteonecrosis vs metastatic dz per outside report. Patient has had a port in place for five years. She denies tenderness or erythema at this site     Review of Systems   Constitutional: Negative for chills and fever.   Respiratory: Negative for cough and shortness of breath.    Cardiovascular: Negative for chest pain and palpitations.   Gastrointestinal: Positive for diarrhea. Negative for abdominal pain, constipation and nausea.   Genitourinary: Negative for dysuria.        Objective:     Vital Signs (Most Recent):  Temp: 98 °F (36.7 °C) (05/25/17 1025)  Pulse: 88 (05/25/17 1100)  Resp: 18 (05/25/17 1054)  BP: 107/76 (05/25/17 1054)  SpO2: 100 % (05/25/17 1054) Vital Signs (24h Range):  Temp:  [97.4 °F (36.3 °C)-98 °F (36.7 °C)] 98 °F (36.7 °C)  Pulse:  [] 88  Resp:  [13-20] 18  SpO2:  [95 %-100 %] 100 %  BP: ()/(58-91) 107/76     Weight: 76.6 kg (168 lb 14 oz)  Body mass index is 28.99 kg/m².    Estimated Creatinine Clearance: 119.9 mL/min (based on Cr of 0.6).    Physical  Exam   Constitutional:   Alert and ambulating   HENT:   Head: Normocephalic and atraumatic.   Eyes: Pupils are equal, round, and reactive to light.   Cardiovascular: Normal rate, regular rhythm, normal heart sounds and intact distal pulses.  Exam reveals no gallop and no friction rub.    No murmur heard.  Pulmonary/Chest: Effort normal and breath sounds normal. No respiratory distress. She has no wheezes. She has no rales. She exhibits no tenderness.   Abdominal: Soft. Bowel sounds are normal. She exhibits no distension and no mass. There is no tenderness. There is no guarding.   Musculoskeletal:   Mild pain with BL upper and lower extremity movement       Significant Labs:   Blood Culture:   Recent Labs  Lab 05/23/17  0012 05/23/17  0233   LABBLOO Gram stain aer bottle: Gram negative rods   Results called to and read back by: Terrence Ewing RN 05/23/2017  17:12  SERRATIA MARCESCENS  No Growth to date  No Growth to date  No Growth to date Gram stain aer bottle: Gram negative rods   Positive results previously called 05/23/2017  19:59  SERRATIA MARCESCENSFor susceptibility see order #[id number]     Bone Marrow Culture: No results for input(s): BONEMARROWCU in the last 4320 hours.  CBC:   Recent Labs  Lab 05/24/17  0355 05/25/17  0639   WBC 4.13 7.25   HGB 7.4*  7.6* 6.5*   HCT 22.1* 19.1*   * 149*     CMP:   Recent Labs  Lab 05/23/17  1528 05/24/17  0355 05/25/17  0639    135* 141   K 3.4* 3.1* 3.5    107 109   CO2 24 21* 27   GLU 88 141* 159*   BUN 6 7 8   CREATININE 0.6 0.7 0.6   CALCIUM 7.9* 7.9* 8.3*   BILITOT  --  1.8*  --    ANIONGAP 8 7* 5*   EGFRNONAA >60.0 >60.0 >60.0     Fungus Culture (Blood or Bone Marrow): No results for input(s): FUNGUSCULTUR in the last 4320 hours.  Microbiology Results (last 7 days)     Procedure Component Value Units Date/Time    Gram stain [[id number]] Collected:  05/25/17 1006    Order Status:  Completed Specimen:  Joint Fluid from Elbow, Right Updated:   05/25/17 1154     Gram Stain Result Moderate WBC's      No organisms seen    Gram stain [[id number]] Collected:  05/25/17 1010    Order Status:  Completed Specimen:  Joint Fluid from Elbow, Right Updated:  05/25/17 1153     Gram Stain Result Rare WBC's      No organisms seen    Narrative:       RIGHT RADIUS # 2    AFB Culture & Smear [[id number]] Collected:  05/25/17 1012    Order Status:  Sent Specimen:  Joint Fluid from Elbow, Right Updated:  05/25/17 1144    Narrative:       R RADIUS  1    Gram stain [[id number]] Collected:  05/25/17 1012    Order Status:  Sent Specimen:  Joint Fluid from Elbow, Right Updated:  05/25/17 1144    Narrative:       R RADIUS 1    Aerobic culture [[id number]] Collected:  05/25/17 0918    Order Status:  Sent Specimen:  Bone from Elbow, Right Updated:  05/25/17 1144    Culture, Anaerobe [[id number]] Collected:  05/25/17 0918    Order Status:  Sent Specimen:  Bone from Elbow, Right Updated:  05/25/17 1143    Aerobic culture [[id number]] Collected:  05/25/17 0937    Order Status:  Sent Specimen:  Biopsy from Elbow, Right Updated:  05/25/17 1118    Narrative:       RIGHT RADIUS # 2    Culture, Anaerobe [[id number]] Collected:  05/25/17 0937    Order Status:  Sent Specimen:  Biopsy from Elbow, Right Updated:  05/25/17 1118    Narrative:       RIGHT RADIUS # 2    AFB Culture & Smear [[id number]] Collected:  05/25/17 1010    Order Status:  Sent Specimen:  Joint Fluid from Elbow, Right Updated:  05/25/17 1118    Narrative:       RIGHT RADIUS # 2    AFB Culture & Smear [[id number]] Collected:  05/25/17 1006    Order Status:  Sent Specimen:  Joint Fluid from Elbow, Right Updated:  05/25/17 1116    Aerobic culture [[id number]] Collected:  05/25/17 1006    Order Status:  Sent Specimen:  Joint Fluid from Elbow, Right Updated:  05/25/17 1115    Culture, Anaerobe [[id number]] Collected:  05/25/17 1006    Order Status:  Sent Specimen:  Joint Fluid from Elbow, Right Updated:  05/25/17 1115    Blood culture  [[id number]] Collected:  05/23/17 0233    Order Status:  Completed Specimen:  Blood from Line, Port A Cath Updated:  05/25/17 0850     Blood Culture, Routine Gram stain aer bottle: Gram negative rods      Blood Culture, Routine Positive results previously called 05/23/2017  19:59     Blood Culture, Routine --     SERRATIA MARCESCENS  For susceptibility see order #[id number]      Narrative:       From port    Blood culture [[id number]]  (Susceptibility) Collected:  05/23/17 0012    Order Status:  Completed Specimen:  Blood Updated:  05/25/17 0849     Blood Culture, Routine Gram stain aer bottle: Gram negative rods      Blood Culture, Routine Results called to and read back by: Terrence Ewing RN 05/23/2017  17:12     Blood Culture, Routine SERRATIA MARCESCENS    Narrative:       Collection has been rescheduled by RNS at 5/22/2017 22:49 Reason: no   armband.  Collection has been rescheduled by RNS at 5/22/2017 22:56 Reason: pt.   in restroom.  Collection has been rescheduled by RNS at 5/22/2017 23:16 Reason: no   armband\ Lew notified they are still waiting to print it out.  Collection has been rescheduled by RNS at 5/22/2017 22:49 Reason: no   armband.  Collection has been rescheduled by RNS at 5/22/2017 22:56 Reason: pt.   in restroom.  Collection has been rescheduled by RNS at 5/22/2017 23:16 Reason: no   armband\ Lew notified they are still waiting to print it out.    Blood culture [[id number]] Collected:  05/25/17 0734    Order Status:  Sent Specimen:  Blood Updated:  05/25/17 0752    Blood culture [[id number]] Collected:  05/25/17 0734    Order Status:  Sent Specimen:  Blood Updated:  05/25/17 0752    Blood culture [[id number]] Collected:  05/23/17 0012    Order Status:  Completed Specimen:  Blood Updated:  05/25/17 0612     Blood Culture, Routine No Growth to date     Blood Culture, Routine No Growth to date     Blood Culture, Routine No Growth to date    Narrative:       Collection has been  rescheduled by RNS at 5/22/2017 22:49 Reason: no   armband.  Collection has been rescheduled by RNS at 5/22/2017 22:56 Reason: pt.   in restroom.  Collection has been rescheduled by RNS at 5/22/2017 23:16 Reason: no   armband\ Lew notified they are still waiting to print it out.  Collection has been rescheduled by RNS at 5/22/2017 22:49 Reason: no   armband.  Collection has been rescheduled by RNS at 5/22/2017 22:56 Reason: pt.   in restroom.  Collection has been rescheduled by RNS at 5/22/2017 23:16 Reason: no   armband\ rn.Hansa notified they are still waiting to print it out.    IV catheter culture [[id number]] Collected:  05/24/17 1926    Order Status:  No result Specimen:  Catheter Tip Updated:  05/24/17 2302    Urine culture [[id number]] Collected:  05/23/17 1529    Order Status:  Completed Specimen:  Urine from Urine, Clean Catch Updated:  05/24/17 2050     Urine Culture, Routine No growth    Culture, Anaerobe [[id number]] Collected:  05/24/17 1926    Order Status:  Canceled Specimen:  Catheter from Chest, Left Updated:  05/24/17 1932    Aerobic culture [[id number]] Collected:  05/24/17 1926    Order Status:  Canceled Specimen:  Catheter Tip from Chest, Left Updated:  05/24/17 1931    Clostridium difficile EIA [[id number]] Collected:  05/23/17 0310    Order Status:  Completed Specimen:  Stool from Stool Updated:  05/23/17 1045     C. diff Antigen Negative     C difficile Toxins A+B, EIA Negative     Comment: Testing not recommended for children <24 months old.           Pathology Results  (Last 10 years)               05/25/17 0639 (A) CBC auto differential (Abnormal) Final result    Narrative:    HGB AND HCT critical result(s) called and verbal readback obtained    from  LEOLA RANDALL RN, 05/25/2017 07:49       05/24/17 0355 (A) CBC auto differential (Abnormal) Final result    05/23/17 0012 (A) CBC auto differential (Abnormal) Final result    Narrative:  H&H   critical result(s) called and verbal  readback obtained from    SONG ABRAHAM RN AT 0244 ON 05/23/2017 BY HOSSEIN   FINAL RESULTS VERIFIED BY REPEAT ANALYSIS, 05/23/2017 02:45   Collection has been rescheduled by RNS at 5/22/2017 22:49 Reason: no    armband.   Collection has been rescheduled by RNS at 5/22/2017 22:56 Reason: pt.    in restroom.   Collection has been rescheduled by RNS at 5/22/2017 23:16 Reason: no    armband\ rn.Hansa notified they are still waiting to print it out.       06/26/14 1757 (A) CBC auto differential (Abnormal) Final result        Respiratory Culture: No results for input(s): GSRESP, RESPIRATORYC in the last 4320 hours.  Urine Culture:   Recent Labs  Lab 05/23/17  1529   LABURIN No growth     Urine Studies:   Recent Labs  Lab 05/23/17  1544   COLORU Yellow   APPEARANCEUA Clear   PHUR 6.0   SPECGRAV 1.010   PROTEINUA Negative   GLUCUA Negative   KETONESU Negative   BILIRUBINUA Negative   OCCULTUA 1+*   NITRITE Negative   UROBILINOGEN Negative   LEUKOCYTESUR Negative   RBCUA 2   WBCUA 1   SQUAMEPITHEL 0     Wound Culture: No results for input(s): LABAERO in the last 4320 hours.

## 2017-05-25 NOTE — ASSESSMENT & PLAN NOTE
Xray bilateral upper extremity with multiple abnormalities, R>L, f/u MRI right arm with multifocal lesions concerning for osteomyelitis   - Initial theory is that patient potentially had osteonecrosis from her sickle cell to begin with, then became bacteremic from her port, which then seeded the previously damaged areas of bone leading to osteomyelitis   - appreciate ortho consult   - management per gram negative bacteremia section

## 2017-05-25 NOTE — ASSESSMENT & PLAN NOTE
- H/H 6.3/18 at OSH, no improvement with transfusion 1 unit PRBCs   - with hx sickle cell disease, pt reports her hemoglobin is usually around 7    - 7.4 yesterday, down to 6.5 today    - will continue to monitor, but likely not attempt transfusion again unless hemoglobin reaches ~6 (or worsening SOB, or CP)   - pt does not appear to have acute chest at this time   - pain control (pt requests q2 hours IV dilaudid, however in the afternoons patient tends to become somnolent, will not increase IV pain medication dose), oxygen PRN, IVF   - hem/onc also consulted, appreciate their recs

## 2017-05-25 NOTE — ASSESSMENT & PLAN NOTE
- BUE pain with evidence of osteonecrosis on xrays concerning for osteomyelitis vs malignancy, vs osteonecrosis 2/2 sickle cell   - MRI concerning for osteomyelitis   - management per above

## 2017-05-25 NOTE — PROGRESS NOTES
Pt to [facility]. Report received from HORACIO Taylor. No complaints or signs of discomfort at this time. Will continue to monitor.

## 2017-05-25 NOTE — PLAN OF CARE
Problem: Patient Care Overview  Goal: Plan of Care Review  Outcome: Ongoing (interventions implemented as appropriate)  Pt remains free from falls and injury. Plan of care reviewed with pt. Pt understands plan. Pt c/o 7/10 pain in bilateral extremities, VSS. Bone biopsy and fluid swab tested today. Will continue to monitor.

## 2017-05-26 LAB
ANION GAP SERPL CALC-SCNC: 7 MMOL/L
ANISOCYTOSIS BLD QL SMEAR: SLIGHT
BASOPHILS # BLD AUTO: ABNORMAL K/UL
BASOPHILS NFR BLD: 0 %
BUN SERPL-MCNC: 7 MG/DL
CALCIUM SERPL-MCNC: 8 MG/DL
CHLORIDE SERPL-SCNC: 107 MMOL/L
CO2 SERPL-SCNC: 25 MMOL/L
CREAT SERPL-MCNC: 0.7 MG/DL
DIFFERENTIAL METHOD: ABNORMAL
EOSINOPHIL # BLD AUTO: ABNORMAL K/UL
EOSINOPHIL NFR BLD: 0 %
ERYTHROCYTE [DISTWIDTH] IN BLOOD BY AUTOMATED COUNT: 25.4 %
EST. GFR  (AFRICAN AMERICAN): >60 ML/MIN/1.73 M^2
EST. GFR  (NON AFRICAN AMERICAN): >60 ML/MIN/1.73 M^2
GLUCOSE SERPL-MCNC: 119 MG/DL
HCT VFR BLD AUTO: 19 %
HGB BLD-MCNC: 6.3 G/DL
HYPOCHROMIA BLD QL SMEAR: ABNORMAL
LDH SERPL L TO P-CCNC: 370 U/L
LYMPHOCYTES # BLD AUTO: ABNORMAL K/UL
LYMPHOCYTES NFR BLD: 24 %
MAGNESIUM SERPL-MCNC: 1.8 MG/DL
MCH RBC QN AUTO: 24.5 PG
MCHC RBC AUTO-ENTMCNC: 33.2 %
MCV RBC AUTO: 74 FL
MONOCYTES # BLD AUTO: ABNORMAL K/UL
MONOCYTES NFR BLD: 6 %
NEUTROPHILS NFR BLD: 70 %
OVALOCYTES BLD QL SMEAR: ABNORMAL
PHOSPHATE SERPL-MCNC: 3.2 MG/DL
PLATELET # BLD AUTO: 145 K/UL
PLATELET BLD QL SMEAR: ABNORMAL
PMV BLD AUTO: 9.4 FL
POIKILOCYTOSIS BLD QL SMEAR: SLIGHT
POLYCHROMASIA BLD QL SMEAR: ABNORMAL
POTASSIUM SERPL-SCNC: 2.9 MMOL/L
RBC # BLD AUTO: 2.57 M/UL
SODIUM SERPL-SCNC: 139 MMOL/L
SYMPTOMS P TRANSF RX PATIENT-IMP: NORMAL
TARGETS BLD QL SMEAR: ABNORMAL
WBC # BLD AUTO: 5.23 K/UL

## 2017-05-26 PROCEDURE — 63600175 PHARM REV CODE 636 W HCPCS: Performed by: INTERNAL MEDICINE

## 2017-05-26 PROCEDURE — 11000001 HC ACUTE MED/SURG PRIVATE ROOM

## 2017-05-26 PROCEDURE — 83615 LACTATE (LD) (LDH) ENZYME: CPT

## 2017-05-26 PROCEDURE — 25000003 PHARM REV CODE 250: Performed by: INTERNAL MEDICINE

## 2017-05-26 PROCEDURE — 85027 COMPLETE CBC AUTOMATED: CPT

## 2017-05-26 PROCEDURE — 36415 COLL VENOUS BLD VENIPUNCTURE: CPT

## 2017-05-26 PROCEDURE — 63600175 PHARM REV CODE 636 W HCPCS: Performed by: HOSPITALIST

## 2017-05-26 PROCEDURE — 84100 ASSAY OF PHOSPHORUS: CPT

## 2017-05-26 PROCEDURE — 80048 BASIC METABOLIC PNL TOTAL CA: CPT

## 2017-05-26 PROCEDURE — 99232 SBSQ HOSP IP/OBS MODERATE 35: CPT | Mod: GC,,, | Performed by: HOSPITALIST

## 2017-05-26 PROCEDURE — 85007 BL SMEAR W/DIFF WBC COUNT: CPT

## 2017-05-26 PROCEDURE — 25000003 PHARM REV CODE 250: Performed by: NURSE PRACTITIONER

## 2017-05-26 PROCEDURE — 83735 ASSAY OF MAGNESIUM: CPT

## 2017-05-26 RX ORDER — POTASSIUM CHLORIDE 750 MG/1
40 CAPSULE, EXTENDED RELEASE ORAL EVERY 4 HOURS
Status: COMPLETED | OUTPATIENT
Start: 2017-05-26 | End: 2017-05-26

## 2017-05-26 RX ORDER — MAGNESIUM SULFATE HEPTAHYDRATE 40 MG/ML
2 INJECTION, SOLUTION INTRAVENOUS ONCE
Status: COMPLETED | OUTPATIENT
Start: 2017-05-26 | End: 2017-05-26

## 2017-05-26 RX ORDER — HYDROMORPHONE HYDROCHLORIDE 1 MG/ML
1.5 INJECTION, SOLUTION INTRAMUSCULAR; INTRAVENOUS; SUBCUTANEOUS
Status: DISCONTINUED | OUTPATIENT
Start: 2017-05-26 | End: 2017-05-26

## 2017-05-26 RX ORDER — HYDROMORPHONE HYDROCHLORIDE 1 MG/ML
2 INJECTION, SOLUTION INTRAMUSCULAR; INTRAVENOUS; SUBCUTANEOUS
Status: DISCONTINUED | OUTPATIENT
Start: 2017-05-26 | End: 2017-05-27

## 2017-05-26 RX ADMIN — OXYCODONE HYDROCHLORIDE 15 MG: 5 TABLET ORAL at 01:05

## 2017-05-26 RX ADMIN — POTASSIUM CHLORIDE 40 MEQ: 750 CAPSULE, EXTENDED RELEASE ORAL at 08:05

## 2017-05-26 RX ADMIN — HYDROMORPHONE HYDROCHLORIDE 1.5 MG: 1 INJECTION, SOLUTION INTRAMUSCULAR; INTRAVENOUS; SUBCUTANEOUS at 10:05

## 2017-05-26 RX ADMIN — ZIPRASIDONE HYDROCHLORIDE 40 MG: 40 CAPSULE ORAL at 08:05

## 2017-05-26 RX ADMIN — CLONAZEPAM 1 MG: 0.5 TABLET ORAL at 08:05

## 2017-05-26 RX ADMIN — CEFEPIME HYDROCHLORIDE 2 G: 2 INJECTION, SOLUTION INTRAVENOUS at 05:05

## 2017-05-26 RX ADMIN — HYDROMORPHONE HYDROCHLORIDE 2 MG: 1 INJECTION, SOLUTION INTRAMUSCULAR; INTRAVENOUS; SUBCUTANEOUS at 05:05

## 2017-05-26 RX ADMIN — OXYCODONE HYDROCHLORIDE 15 MG: 5 TABLET ORAL at 04:05

## 2017-05-26 RX ADMIN — CITALOPRAM HYDROBROMIDE 20 MG: 20 TABLET ORAL at 08:05

## 2017-05-26 RX ADMIN — HYDROMORPHONE HYDROCHLORIDE 2 MG: 1 INJECTION, SOLUTION INTRAMUSCULAR; INTRAVENOUS; SUBCUTANEOUS at 10:05

## 2017-05-26 RX ADMIN — OXYCODONE HYDROCHLORIDE 15 MG: 5 TABLET ORAL at 07:05

## 2017-05-26 RX ADMIN — MAGNESIUM SULFATE IN WATER 2 G: 40 INJECTION, SOLUTION INTRAVENOUS at 08:05

## 2017-05-26 RX ADMIN — MORPHINE SULFATE 30 MG: 30 TABLET, EXTENDED RELEASE ORAL at 09:05

## 2017-05-26 RX ADMIN — BUSPIRONE HYDROCHLORIDE 15 MG: 5 TABLET ORAL at 05:05

## 2017-05-26 RX ADMIN — CLONAZEPAM 1 MG: 0.5 TABLET ORAL at 09:05

## 2017-05-26 RX ADMIN — BUSPIRONE HYDROCHLORIDE 15 MG: 5 TABLET ORAL at 01:05

## 2017-05-26 RX ADMIN — BUSPIRONE HYDROCHLORIDE 15 MG: 5 TABLET ORAL at 09:05

## 2017-05-26 RX ADMIN — HYDROMORPHONE HYDROCHLORIDE 1.5 MG: 1 INJECTION, SOLUTION INTRAMUSCULAR; INTRAVENOUS; SUBCUTANEOUS at 01:05

## 2017-05-26 RX ADMIN — MORPHINE SULFATE 30 MG: 30 TABLET, EXTENDED RELEASE ORAL at 08:05

## 2017-05-26 RX ADMIN — HYDROMORPHONE HYDROCHLORIDE 1 MG: 1 INJECTION, SOLUTION INTRAMUSCULAR; INTRAVENOUS; SUBCUTANEOUS at 06:05

## 2017-05-26 RX ADMIN — POTASSIUM CHLORIDE 40 MEQ: 750 CAPSULE, EXTENDED RELEASE ORAL at 01:05

## 2017-05-26 RX ADMIN — ZIPRASIDONE HYDROCHLORIDE 40 MG: 40 CAPSULE ORAL at 04:05

## 2017-05-26 RX ADMIN — HYDROMORPHONE HYDROCHLORIDE 1 MG: 1 INJECTION, SOLUTION INTRAMUSCULAR; INTRAVENOUS; SUBCUTANEOUS at 03:05

## 2017-05-26 NOTE — PLAN OF CARE
Problem: Patient Care Overview  Goal: Plan of Care Review  Outcome: Revised  Plan of care discussed with patient. Patient is free of fall/trauma/injury. Denies CP, SOB. Pain being managed with PRN pain medication.  All questions addressed. Will continue to monitor

## 2017-05-26 NOTE — PLAN OF CARE
Problem: Patient Care Overview  Goal: Plan of Care Review  Outcome: Ongoing (interventions implemented as appropriate)  Pt denies Chest pain, SOB or nausea. C/o pain on Left upper hand; prn dilaudid and oxycodone given timely. No falls, trauma or injury noted. VSS. S/p bone biopsy; CDI dsg X2 in R FA. IV antibiotics. Plan of care reviewed with patient. No further questions at this time. No significant events. Will continue to monitor.

## 2017-05-26 NOTE — ASSESSMENT & PLAN NOTE
c dif negative   - electrolyte abnormalities   - improving slightly, but still a few times a day   - will treat symptomatically with loperamide    - was ordered, but not given. Will attempt to use the PRN medication today for her symptoms and evaluate for response.

## 2017-05-26 NOTE — PLAN OF CARE
05/26/17 1359   Right Care Assessment   Can the patient answer the patient profile reliably? Yes, cognitively intact   How often would a person be available to care for the patient? Whenever needed   Describe the patient's ability to walk at the present time. Minor restrictions or changes   How does the patient rate their overall health at the present time? Poor   Number of comorbid conditions (as recorded on the chart) Two   During the past month, has the patient often been bothered by feeling down, depressed or hopeless? No   During the past month, has the patient often been bothered by little interest or pleasure in doing things? No

## 2017-05-26 NOTE — ASSESSMENT & PLAN NOTE
- Bcx at [facility] with preliminary result of GNR in aerobic & anaerobic bottles    - Serratia, sensitive to ceftriaxone, cipro, cefepime, bactrim. Resistant to cefazolin, intermidate to tobra and cefoxitin.  - repeat blood cx here also with serratia, pan-sensitive  - ID consulted, appreciate their recs  - potential sources include port and osteomyelitis.    - s/p port removal 5/24 with gen surg, appreciate their assistance   - patient's MRI right arm with multiple lesions concerning for osteomyelitis, along with possible septic joint     - s/p IR bone biopsy and joint aspiration 5/25  - Many cultures still pending    - So far: Catheter tip with gram negative rods    - bone biopsy x2 with gram negative rods    - repeat blood cx negative so far

## 2017-05-26 NOTE — PROGRESS NOTES
Attestation    I have reviewed the chart and discussed the patient with the academic medicine team, including overnight events, results, and assessment and plan. I have supervised the care provided by the resident team, and personally seen and evaluated the patient, and agree with the resident's documentation      Pain at bx sites not well controlled. Will increase dilaudid to 2 mg q 3 h for a few days. Blood culture from 5/23 and 5/25 and bone and port cultures from 5/25  >  Serratia sensitive to all.     A/  Serratia bacteremia  PORT infection  Osteomyelitis    P/  Continue cefepime for now  Consider de escalation to Cipro  Repeat BC in AM     Edgardo Avila MD [facility] Medicine       [facility] Medicine  Progress Note    Patient Name: Yamila Lan  MRN: [id number]  Patient Class: IP- Inpatient   Admission Date: 5/22/2017  Length of Stay: 4 days  Attending Physician: Edgardo Avila MD  Primary Care Provider: Silvana Dhaliwal MD    [facility] Medicine Team: [facility] L Moe Shields MD    Subjective:     Principal Problem:Osteonecrosis    HPI:  Patient is a 46 y.o. female with significant past medical history of HTN and sickle cell dz was transferred from [facility]. Pt had originally presented to the hospital c/o sickle cell crisis; specifically bilateral arm pain (L > R) for two weeks. Pt thought pain may have been related to a fall she sustained approximately 1 month ago.  She also endorses h/o fever, chills and body aches. While admitted the pt began having fevers and Bcx grew out GNR (initally tx with cefepime, now on meropenem). BUE xrays are showing periosteal dz concerning for possible osteomyelitis vs metastatic dz. Prior to admission pt had port in place 2/2 poor venous access.     Latest labs at OSH revealed WBC 7.1, anemia (H/H 6.3/19), retic count 4.7, mild hypokalemia (K 3.1), mild hypomagnesemia (Mag 1.5), ESR & CRP elevated (118 and 103  "respectively), lactic 1.1; INR 1.4, UA neg for infection, UPT neg. Bcx preliminary results showing GNR in both aerobic and anaerobic bottles. Xray LUE  showed "osseous erosive lesions within the mid humeral diaphysis bone marrow with additional destructive lesions noted in the proximal radial diaphysis. Shoulder and elbow joints intact. Findings concerning for metastatic disease to humerus and proximal radius." Xray RUE revealed "destructive mauro lesion within mid humeral diaphyseal bone marrow. Destructive lesions additional seening involiving radius & ulna. Should and elbow joints intact." Pt also had MRI of left shoulder performed revealing changes consistent with "early osteonecrosis along superior and medial aspect of left humeral head."  CXRY was neg for acute cardiopulmonary process. Pt was originally supposed to be transfused 3 units PRBCs, however pt has h/o antibodies with difficulty matching blood and had a questionable reaction to one of the units. It is unclear from chart how many units were received. Per OSH records, the pt's port was d/c'd 2/2 bacteremia and PICC was placed, however it appears that this did not occur and pt's port is still in place. L shoulder arthrocentesis was performed, gram stain without organisms or WBCs.       Hospital Course:  Patient admitted to hospital medicine with infectious disease, orthopedics, and hematology/oncology consults. Contacted OSH for further culture results, patient is growing serratia, sensitive to cipro, ceftriaxone, cefepime, bactrim, amikacin. Resistant to cefazolin. Intermediate to cefoxitin, tobra.  5/23: Pt had xray, then MRI of right forearm with concern for possible multi-focal osteomyelitis, possible septic joint. Repeat blood cultures drawn here growing gram negative rods. Fever and tachycardia overnight around the time of blood transfusion.  5/24: Since repeat blood cultures still +, general surgery was consulted and removed the patient's port. " Sensitivities here result as pan-sensitive serratia  5/25: IR performed bone biopsy of osteomyelitis as well as joint aspiration of right elbow. In addition to those cultures, repeat blood cultures were drawn to assess for clearance.  5/26: Port catheter tip culture growing gram negative marjorie. So are 2 of the IR bone biopsies. Repeat blood cultures so far negative x1 day.    Interval History: NAEON. This morning, pt reports increased pain at site of IR biopsies to right arm, requesting increased pain medication. Pt also reports back pain after returning from IR, reports she was laying on her back for a while there. Otherwise, this morning denies fevers/chills, no CP/SOB, still diarrhea ~2x/day. No dysuria. No nausea/vomit/dizziness/lightheadedness/numbness/tingling. No other new complaints.      Review of Systems   Constitutional: Positive for chills, fatigue and fever. Negative for appetite change.   HENT: Negative.  Negative for sinus pressure and sore throat.    Eyes: Negative.  Negative for visual disturbance.   Respiratory: Positive for shortness of breath (with exertion). Negative for cough.    Cardiovascular: Negative.  Negative for chest pain and palpitations.   Gastrointestinal: Positive for diarrhea (for 2 weeks, non-bloody). Negative for abdominal pain, nausea and vomiting.   Endocrine: Negative.    Genitourinary: Negative.  Negative for difficulty urinating and dysuria.   Musculoskeletal: Positive for arthralgias, back pain and myalgias. Negative for joint swelling.   Skin: Negative.    Allergic/Immunologic: Negative.    Neurological: Negative.  Negative for headaches.   Psychiatric/Behavioral: Negative.      Objective:     Vital Signs (Most Recent):  Temp: 98.2 °F (36.8 °C) (05/26/17 0717)  Pulse: 86 (05/26/17 0717)  Resp: 19 (05/26/17 0717)  BP: 132/87 (05/26/17 0717)  SpO2: 96 % (05/26/17 0717) Vital Signs (24h Range):  Temp:  [97.9 °F (36.6 °C)-98.2 °F (36.8 °C)] 98.2 °F (36.8 °C)  Pulse:  []  86  Resp:  [10-22] 19  SpO2:  [96 %-100 %] 96 %  BP: ()/(67-87) 132/87     Weight: 76.6 kg (168 lb 14 oz)  Body mass index is 28.99 kg/m².    Intake/Output Summary (Last 24 hours) at 05/26/17 1043  Last data filed at 05/26/17 0600   Gross per 24 hour   Intake             1380 ml   Output              975 ml   Net              405 ml      Physical Exam   Constitutional: She is oriented to person, place, and time. She appears well-developed and well-nourished. No distress.   HENT:   Head: Normocephalic and atraumatic.   Eyes: EOM are normal. Pupils are equal, round, and reactive to light.   Neck: Normal range of motion. Neck supple.   Cardiovascular: Normal rate, regular rhythm, normal heart sounds and intact distal pulses.    Pulmonary/Chest: Effort normal and breath sounds normal. No respiratory distress. She has no wheezes. She has no rales. She exhibits no tenderness.   S/p port removal from left subclavian   Abdominal: Soft. Bowel sounds are normal. She exhibits no distension. There is no tenderness.   Musculoskeletal: Normal range of motion. She exhibits tenderness. She exhibits no edema or deformity.   Spine without tenderness to palpation   Neurological: She is alert and oriented to person, place, and time.   Skin: Skin is warm and dry. She is not diaphoretic.   Psychiatric: Her behavior is normal.   Nursing note and vitals reviewed.      Significant Labs:   Blood Culture:     Recent Labs  Lab 05/25/17  0734   LABBLOO No Growth to date  No Growth to date  No Growth to date  No Growth to date     BMP:     Recent Labs  Lab 05/26/17  0630   *      K 2.9*      CO2 25   BUN 7   CREATININE 0.7   CALCIUM 8.0*   MG 1.8     CBC:     Recent Labs  Lab 05/25/17  0639 05/26/17  0630   WBC 7.25 5.23   HGB 6.5* 6.3*   HCT 19.1* 19.0*   * 145*       Significant Imaging:   Imaging Results          IR Abscess Aspiration (Final result)  Result time 05/25/17 15:54:02    Final result by Antwon  MD Elizabeth (05/25/17 15:54:02)                 Impression:      1.  CT guided biopsy of right radius.  Material submitted for pathology and microbiology analysis.  2.  Ultrasound guided aspiration/lavage of right elbow.  Material submitted for microbiology analysis.      Electronically signed by: JAMAAL NOVA MD  Date:     05/25/17  Time:    15:54              Narrative:    CT guided biopsy of the right radius and ultrasound guided aspiration of right elbow.    Indication: History of sickle cell disease.  Lytic lesion in the right radius.    Equipment: Bonopty 15-gauge introducer and 14-gauge biopsy sets.  Oncontrol 11G biopsy system; 19-gauge Truguide needle.    Procedure:     Risks including infection, bleeding, fracture, tumor spread, neurovascular injury were discussed with the patient and written consent was obtained.  The patient was placed supine on the CT table.  Suitable biopsy site was selected using CT guidance.  The skin was prepped and draped in the usual sterile fashion.  Moderate sedation was administered under my supervision.  Lidocaine was utilized for local anesthesia.  Under CT guidance, the Bonopty penetration needle was advanced into the right radius.  Biopsy was advanced coaxially and 2 passes were performed, yielding no significant material.  Subsequently, Oncontrol needle was advanced into the right radius and a CT guidance and 3 core biopsies were performed.  Aspiration was also performed, to be submitted for microbiology.  All needles were removed and hemostasis was achieved.      Patient's arm was repositioned and was read prepped and draped in sterile fashion.  Lidocaine was utilized for local anesthesia at the posterior aspect of the elbow.  Truguide needle was advanced into the elbow under sonographic guidance.  Aspiration yielded no material, though small effusion was visualized in the olecranon fossa.  Subsequently, 10 mL sterile saline was utilized for lavage, yielding 5 mL  serosanguineous fluid.  Needle was removed with adequate hemostasis.    Findings: Aggressive lytic process identified within the right radius and ulna.                             MRI Upper Extremity W WO Contrast Right (Final result)     Abnormal  Result time 05/23/17 19:50:57    Final result by Adilson Vela MD (05/23/17 19:50:57)                 Impression:        Findings most compatible with extensive multifocal osteomyelitis and myositis as above.    Small periarticular abscess involving the lateral aspect of the distal humerus with associated joint effusion concerning for septic joint.    Report has been flagged in the EPIC medical record system.    ______________________________________     Electronically signed by resident: VINICIO PICHARDO MD  Date:     05/23/17  Time:    19:19            As the supervising and teaching physician, I personally reviewed the images and resident's interpretation and I agree with the findings.          Electronically signed by: ADILSON VELA MD  Date:     05/23/17  Time:    19:50              Narrative:    Technique: Multiplanar multisequence MRI of the right forearm before and after the administration of 8 cc of Gadavist intravenous contrast.    Comparison: Radiograph same day.    Findings:    There is diffuse soft tissue edema throughout the right forearm which enhances after the administration of contrast.  These findings are primarily radha-osseous involving the musculature throughout the forearm compatible with myositis.  Examination is somewhat limited secondary to patient motion artifact.  There is a small fluid collection posterior to the lateral aspect of the distal humerus demonstrate peripheral enhancement with central T2 hyperintensity measuring 1.2 cm most compatible with a soft tissue abscess.  There is associated elbow effusion with septic joint not excluded.    There is multifocal areas of cortical destruction throughout the radius involving the proximal and  distal metaphyses and diaphysis as well as the mid ulna demonstrate abnormal T1 hypointensity with postcontrast enhancement most compatible with osteomyelitis.  No focal intraosseous fluid collections.                             X-Ray Chest 1 View (Final result)  Result time 05/23/17 12:23:54    Final result by Yan Haskins MD (05/23/17 12:23:54)                 Impression:     As above.          Electronically signed by: Yan Haskins MD  Date:     05/23/17  Time:    12:23              Narrative:    No prior chest radiographs available.    Vascular catheter enters from a left subclavian approach, its tip in the superior vena cava.  No pneumothorax.  Heart size is normal, as is the appearance of the pulmonary vascularity.  A minimal linear opacity consistent with subsegmental atelectasis or parenchymal scarring is observed in the left lower lung zone, but overall the lung zones are essentially clear and are free of significant airspace consolidation or volume loss.  No pleural fluid.  No hilar or mediastinal mass lesion.  Some sclerosis involving the left humeral head is observed, and reference should be made to the report of an earlier examination of the humeri dated 5/23/17 and 9:22 AM.                             X-Ray Forearm Bilateral (Final result)  Result time 05/23/17 10:11:41   Procedure changed from X-Ray Forearm Left     Final result by Freddy Ko III, MD (05/23/17 10:11:41)                 Impression:     Bilateral forearm bone infarcts.  Infection less likely.      Electronically signed by: FREDDY KO  Date:     05/23/17  Time:    10:11              Narrative:    2 views bilateral.    Right radius demonstrates 2 destructive lesions right ulna one.  Left radius demonstrates one destructive process.  These are most likely bone infarcts.  Osteomyelitis probably less likely.                             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  Infection less likely.      Electronically signed by: FREDDY KO  Date:     05/23/17  Time:    10:00                              X-Ray Tibia Fibula Bilateral (Final result)  Result time 05/23/17 10:01:24    Final result by Freddy Ko III, MD (05/23/17 10:01:24)                 Narrative:    2 views bilateral.    Right: There is a distal femur proximal tibia distal tibia lesion consistent with bone infarcts.    Left: There are multiple tibial bone infarcts in the distal femur bone infarct.  No pathologic fracture seen.      Electronically signed by: FREDDY KO  Date:     05/23/17  Time:    10:01                              X-Ray Femur 2 View Bilateral (Final result)  Result time 05/23/17 10:04:27    Final result by Freddy Ko III, MD (05/23/17 10:04:27)                 Narrative:    2 views bilateral.    Right: There are multiple bone infarcts suspected throughout the right femur.  Also the proximal right tibia.  No fracture dislocation bone destruction seen.    Left: There are multiple bone infarcts suspected throughout the left femur and proximal tibia.  No fracture dislocation bone destruction seen.      Electronically signed by: FREDDY KO  Date:     05/23/17  Time:    10:04                              X-Ray Forearm Right (No Result on File)                   Assessment/Plan:      Gram-negative bacteremia    - Bcx at [facility] with preliminary result of GNR in aerobic & anaerobic bottles    - Serratia, sensitive to ceftriaxone, cipro, cefepime, bactrim. Resistant to cefazolin, intermidate to tobra and cefoxitin.  - repeat blood cx here also with serratia, pan-sensitive  - ID consulted, appreciate their recs  - potential sources include port and osteomyelitis.    - s/p port removal 5/24 with gen surg, appreciate their  assistance   - patient's MRI right arm with multiple lesions concerning for osteomyelitis, along with possible septic joint     - s/p IR bone biopsy and joint aspiration 5/25  - Many cultures still pending    - So far: Catheter tip with gram negative rods    - bone biopsy x2 with gram negative rods    - repeat blood cx negative so far          Acute hematogenous osteomyelitis    Xray bilateral upper extremity with multiple abnormalities, R>L, f/u MRI right arm with multifocal lesions concerning for osteomyelitis   - Initial theory is that patient potentially had osteonecrosis from her sickle cell to begin with, then became bacteremic from her port, which then seeded the previously damaged areas of bone leading to osteomyelitis   - appreciate ortho consult   - management per gram negative bacteremia section          Sickle cell crisis    - H/H 6.3/18 at OSH, no improvement with transfusion 1 unit PRBCs   - with hx sickle cell disease, pt reports her hemoglobin is usually around 7    - 7.4 -> 6.5 ->6.3 today    - will continue to monitor, but likely not attempt transfusion again unless hemoglobin reaches ~6 (or worsening SOB, or CP)     - will order type and screen with tomorrow's morning labs in case xfusion needed. Consider pre-medication with tylenol/PO benadryl if transfusing   - pt does not appear to have acute chest at this time   - pain control (pt requests q2 hours IV dilaudid, however in the afternoons patient tends to become somnolent. For today though will increase dilaudid to 1.5 mg q3 hours   - hem/onc also consulted, appreciate their recs          Diarrhea    c dif negative   - electrolyte abnormalities   - improving slightly, but still a few times a day   - will treat symptomatically with loperamide    - was ordered, but not given. Will attempt to use the PRN medication today for her symptoms and evaluate for response.          Sepsis    Per gram negative bacteremia section          Tachycardia    Most  likely 2/2 sepsis, bacteremia   - Will continue to treat the sepsis          * Osteonecrosis    - BUE pain with evidence of osteonecrosis on xrays concerning for osteomyelitis vs malignancy, vs osteonecrosis 2/2 sickle cell   - MRI concerning for osteomyelitis   - management per above          VTE Risk Mitigation         Ordered     Medium Risk of VTE  Once      05/25/17 1316     Reason for No Pharmacological VTE Prophylaxis  Once      05/25/17 1316          Moe Shields MD  Department of Hospital Medicine   [facility]

## 2017-05-26 NOTE — ASSESSMENT & PLAN NOTE
- H/H 6.3/18 at OSH, no improvement with transfusion 1 unit PRBCs   - with hx sickle cell disease, pt reports her hemoglobin is usually around 7    - 7.4 -> 6.5 ->6.3 today    - will continue to monitor, but likely not attempt transfusion again unless hemoglobin reaches ~6 (or worsening SOB, or CP)     - will order type and screen with tomorrow's morning labs in case xfusion needed. Consider pre-medication with tylenol/PO benadryl if transfusing   - pt does not appear to have acute chest at this time   - pain control (pt requests q2 hours IV dilaudid, however in the afternoons patient tends to become somnolent. For today though will increase dilaudid to 1.5 mg q3 hours   - hem/onc also consulted, appreciate their recs

## 2017-05-26 NOTE — PROGRESS NOTES
Patient seen and examined at [facility],  She is s/p IR biopsy of arm.  Pain controlled      PE:  [id number]  NVI BL UE  Warm well perfused hands BL  Mild ttp BL arm and foream    Will f/u cultures and biopsy, likely will need long term IV Abx via PICC  will follow while in hospital

## 2017-05-26 NOTE — SUBJECTIVE & OBJECTIVE
Interval History: NAEON. This morning, pt reports increased pain at site of IR biopsies to right arm, requesting increased pain medication. Pt also reports back pain after returning from IR, reports she was laying on her back for a while there. Otherwise, this morning denies fevers/chills, no CP/SOB, still diarrhea ~2x/day. No dysuria. No nausea/vomit/dizziness/lightheadedness/numbness/tingling. No other new complaints.      Review of Systems   Constitutional: Positive for chills, fatigue and fever. Negative for appetite change.   HENT: Negative.  Negative for sinus pressure and sore throat.    Eyes: Negative.  Negative for visual disturbance.   Respiratory: Positive for shortness of breath (with exertion). Negative for cough.    Cardiovascular: Negative.  Negative for chest pain and palpitations.   Gastrointestinal: Positive for diarrhea (for 2 weeks, non-bloody). Negative for abdominal pain, nausea and vomiting.   Endocrine: Negative.    Genitourinary: Negative.  Negative for difficulty urinating and dysuria.   Musculoskeletal: Positive for arthralgias, back pain and myalgias. Negative for joint swelling.   Skin: Negative.    Allergic/Immunologic: Negative.    Neurological: Negative.  Negative for headaches.   Psychiatric/Behavioral: Negative.      Objective:     Vital Signs (Most Recent):  Temp: 98.2 °F (36.8 °C) (05/26/17 0717)  Pulse: 86 (05/26/17 0717)  Resp: 19 (05/26/17 0717)  BP: 132/87 (05/26/17 0717)  SpO2: 96 % (05/26/17 0717) Vital Signs (24h Range):  Temp:  [97.9 °F (36.6 °C)-98.2 °F (36.8 °C)] 98.2 °F (36.8 °C)  Pulse:  [] 86  Resp:  [10-22] 19  SpO2:  [96 %-100 %] 96 %  BP: ()/(67-87) 132/87     Weight: 76.6 kg (168 lb 14 oz)  Body mass index is 28.99 kg/m².    Intake/Output Summary (Last 24 hours) at 05/26/17 1043  Last data filed at 05/26/17 0600   Gross per 24 hour   Intake             1380 ml   Output              975 ml   Net              405 ml      Physical Exam   Constitutional:  She is oriented to person, place, and time. She appears well-developed and well-nourished. No distress.   HENT:   Head: Normocephalic and atraumatic.   Eyes: EOM are normal. Pupils are equal, round, and reactive to light.   Neck: Normal range of motion. Neck supple.   Cardiovascular: Normal rate, regular rhythm, normal heart sounds and intact distal pulses.    Pulmonary/Chest: Effort normal and breath sounds normal. No respiratory distress. She has no wheezes. She has no rales. She exhibits no tenderness.   S/p port removal from left subclavian   Abdominal: Soft. Bowel sounds are normal. She exhibits no distension. There is no tenderness.   Musculoskeletal: Normal range of motion. She exhibits tenderness. She exhibits no edema or deformity.   Spine without tenderness to palpation   Neurological: She is alert and oriented to person, place, and time.   Skin: Skin is warm and dry. She is not diaphoretic.   Psychiatric: Her behavior is normal.   Nursing note and vitals reviewed.      Significant Labs:   Blood Culture:     Recent Labs  Lab 05/25/17  0734   LABBLOO No Growth to date  No Growth to date  No Growth to date  No Growth to date     BMP:     Recent Labs  Lab 05/26/17  0630   *      K 2.9*      CO2 25   BUN 7   CREATININE 0.7   CALCIUM 8.0*   MG 1.8     CBC:     Recent Labs  Lab 05/25/17  0639 05/26/17  0630   WBC 7.25 5.23   HGB 6.5* 6.3*   HCT 19.1* 19.0*   * 145*       Significant Imaging:   Imaging Results          IR Abscess Aspiration (Final result)  Result time 05/25/17 15:54:02    Final result by Jamaal Johnson MD (05/25/17 15:54:02)                 Impression:      1.  CT guided biopsy of right radius.  Material submitted for pathology and microbiology analysis.  2.  Ultrasound guided aspiration/lavage of right elbow.  Material submitted for microbiology analysis.      Electronically signed by: JAMAAL JOHNSON MD  Date:     05/25/17  Time:    15:54              Narrative:    CT  guided biopsy of the right radius and ultrasound guided aspiration of right elbow.    Indication: History of sickle cell disease.  Lytic lesion in the right radius.    Equipment: Bonopty 15-gauge introducer and 14-gauge biopsy sets.  Oncontrol 11G biopsy system; 19-gauge Truguide needle.    Procedure:     Risks including infection, bleeding, fracture, tumor spread, neurovascular injury were discussed with the patient and written consent was obtained.  The patient was placed supine on the CT table.  Suitable biopsy site was selected using CT guidance.  The skin was prepped and draped in the usual sterile fashion.  Moderate sedation was administered under my supervision.  Lidocaine was utilized for local anesthesia.  Under CT guidance, the Bonopty penetration needle was advanced into the right radius.  Biopsy was advanced coaxially and 2 passes were performed, yielding no significant material.  Subsequently, Oncontrol needle was advanced into the right radius and a CT guidance and 3 core biopsies were performed.  Aspiration was also performed, to be submitted for microbiology.  All needles were removed and hemostasis was achieved.      Patient's arm was repositioned and was read prepped and draped in sterile fashion.  Lidocaine was utilized for local anesthesia at the posterior aspect of the elbow.  Truguide needle was advanced into the elbow under sonographic guidance.  Aspiration yielded no material, though small effusion was visualized in the olecranon fossa.  Subsequently, 10 mL sterile saline was utilized for lavage, yielding 5 mL serosanguineous fluid.  Needle was removed with adequate hemostasis.    Findings: Aggressive lytic process identified within the right radius and ulna.                             MRI Upper Extremity W WO Contrast Right (Final result)     Abnormal  Result time 05/23/17 19:50:57    Final result by Adilson Vela MD (05/23/17 19:50:57)                 Impression:        Findings most  compatible with extensive multifocal osteomyelitis and myositis as above.    Small periarticular abscess involving the lateral aspect of the distal humerus with associated joint effusion concerning for septic joint.    Report has been flagged in the EP[employer] medical record system.    ______________________________________     Electronically signed by resident: VINICIO PICHARDO MD  Date:     05/23/17  Time:    19:19            As the supervising and teaching physician, I personally reviewed the images and resident's interpretation and I agree with the findings.          Electronically signed by: FREDY MURRAY MD  Date:     05/23/17  Time:    19:50              Narrative:    Technique: Multiplanar multisequence MRI of the right forearm before and after the administration of 8 cc of Gadavist intravenous contrast.    Comparison: Radiograph same day.    Findings:    There is diffuse soft tissue edema throughout the right forearm which enhances after the administration of contrast.  These findings are primarily radha-osseous involving the musculature throughout the forearm compatible with myositis.  Examination is somewhat limited secondary to patient motion artifact.  There is a small fluid collection posterior to the lateral aspect of the distal humerus demonstrate peripheral enhancement with central T2 hyperintensity measuring 1.2 cm most compatible with a soft tissue abscess.  There is associated elbow effusion with septic joint not excluded.    There is multifocal areas of cortical destruction throughout the radius involving the proximal and distal metaphyses and diaphysis as well as the mid ulna demonstrate abnormal T1 hypointensity with postcontrast enhancement most compatible with osteomyelitis.  No focal intraosseous fluid collections.                             X-Ray Chest 1 View (Final result)  Result time 05/23/17 12:23:54    Final result by Yan Haskins MD (05/23/17 12:23:54)                 Impression:     As  above.          Electronically signed by: Yan Haskins MD  Date:     05/23/17  Time:    12:23              Narrative:    No prior chest radiographs available.    Vascular catheter enters from a left subclavian approach, its tip in the superior vena cava.  No pneumothorax.  Heart size is normal, as is the appearance of the pulmonary vascularity.  A minimal linear opacity consistent with subsegmental atelectasis or parenchymal scarring is observed in the left lower lung zone, but overall the lung zones are essentially clear and are free of significant airspace consolidation or volume loss.  No pleural fluid.  No hilar or mediastinal mass lesion.  Some sclerosis involving the left humeral head is observed, and reference should be made to the report of an earlier examination of the humeri dated 5/23/17 and 9:22 AM.                             X-Ray Forearm Bilateral (Final result)  Result time 05/23/17 10:11:41   Procedure changed from X-Ray Forearm Left     Final result by Freddy Ko III, MD (05/23/17 10:11:41)                 Impression:     Bilateral forearm bone infarcts.  Infection less likely.      Electronically signed by: FREDDY KO  Date:     05/23/17  Time:    10:11              Narrative:    2 views bilateral.    Right radius demonstrates 2 destructive lesions right ulna one.  Left radius demonstrates one destructive process.  These are most likely bone infarcts.  Osteomyelitis probably less likely.                             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  Infection less likely.      Electronically signed by: FREDDY KO  Date:     05/23/17  Time:    10:00                              X-Ray Tibia Fibula Bilateral (Final result)  Result time 05/23/17 10:01:24    Final  result by Freddy Ko III, MD (05/23/17 10:01:24)                 Narrative:    2 views bilateral.    Right: There is a distal femur proximal tibia distal tibia lesion consistent with bone infarcts.    Left: There are multiple tibial bone infarcts in the distal femur bone infarct.  No pathologic fracture seen.      Electronically signed by: FREDDY KO  Date:     05/23/17  Time:    10:01                              X-Ray Femur 2 View Bilateral (Final result)  Result time 05/23/17 10:04:27    Final result by Freddy Ko III, MD (05/23/17 10:04:27)                 Narrative:    2 views bilateral.    Right: There are multiple bone infarcts suspected throughout the right femur.  Also the proximal right tibia.  No fracture dislocation bone destruction seen.    Left: There are multiple bone infarcts suspected throughout the left femur and proximal tibia.  No fracture dislocation bone destruction seen.      Electronically signed by: FREDDY KO  Date:     05/23/17  Time:    10:04                              X-Ray Forearm Right (No Result on File)

## 2017-05-27 LAB
ABO + RH BLD: NORMAL
ANION GAP SERPL CALC-SCNC: 4 MMOL/L
ANISOCYTOSIS BLD QL SMEAR: SLIGHT
BACTERIA CATH TIP CULT: NORMAL
BACTERIA SPEC AEROBE CULT: NORMAL
BACTERIA SPEC AEROBE CULT: NORMAL
BASOPHILS # BLD AUTO: ABNORMAL K/UL
BASOPHILS NFR BLD: 0 %
BLD GP AB SCN CELLS X3 SERPL QL: NORMAL
BLD PROD TYP BPU: NORMAL
BLD PROD TYP BPU: NORMAL
BLOOD UNIT EXPIRATION DATE: NORMAL
BLOOD UNIT EXPIRATION DATE: NORMAL
BLOOD UNIT TYPE CODE: 5100
BLOOD UNIT TYPE CODE: 5100
BLOOD UNIT TYPE: NORMAL
BLOOD UNIT TYPE: NORMAL
BUN SERPL-MCNC: 4 MG/DL
CALCIUM SERPL-MCNC: 8 MG/DL
CHLORIDE SERPL-SCNC: 109 MMOL/L
CO2 SERPL-SCNC: 28 MMOL/L
CODING SYSTEM: NORMAL
CODING SYSTEM: NORMAL
CREAT SERPL-MCNC: 0.6 MG/DL
DIFFERENTIAL METHOD: ABNORMAL
DISPENSE STATUS: NORMAL
DISPENSE STATUS: NORMAL
EOSINOPHIL # BLD AUTO: ABNORMAL K/UL
EOSINOPHIL NFR BLD: 0 %
ERYTHROCYTE [DISTWIDTH] IN BLOOD BY AUTOMATED COUNT: 26.1 %
EST. GFR  (AFRICAN AMERICAN): >60 ML/MIN/1.73 M^2
EST. GFR  (NON AFRICAN AMERICAN): >60 ML/MIN/1.73 M^2
GLUCOSE SERPL-MCNC: 83 MG/DL
HCT VFR BLD AUTO: 19.7 %
HGB BLD-MCNC: 6.2 G/DL
HYPOCHROMIA BLD QL SMEAR: ABNORMAL
LDH SERPL L TO P-CCNC: 378 U/L
LYMPHOCYTES # BLD AUTO: ABNORMAL K/UL
LYMPHOCYTES NFR BLD: 19 %
MAGNESIUM SERPL-MCNC: 1.9 MG/DL
MCH RBC QN AUTO: 23.7 PG
MCHC RBC AUTO-ENTMCNC: 31.5 %
MCV RBC AUTO: 75 FL
METAMYELOCYTES NFR BLD MANUAL: 3 %
MONOCYTES # BLD AUTO: ABNORMAL K/UL
MONOCYTES NFR BLD: 7 %
NEUTROPHILS NFR BLD: 71 %
NUM UNITS TRANS PACKED RBC: NORMAL
NUM UNITS TRANS PACKED RBC: NORMAL
PHOSPHATE SERPL-MCNC: 3.4 MG/DL
PLATELET # BLD AUTO: 181 K/UL
PLATELET BLD QL SMEAR: ABNORMAL
PMV BLD AUTO: 9.6 FL
POTASSIUM SERPL-SCNC: 3.8 MMOL/L
RBC # BLD AUTO: 2.62 M/UL
SODIUM SERPL-SCNC: 141 MMOL/L
WBC # BLD AUTO: 5.18 K/UL

## 2017-05-27 PROCEDURE — 80048 BASIC METABOLIC PNL TOTAL CA: CPT

## 2017-05-27 PROCEDURE — 25000003 PHARM REV CODE 250: Performed by: NURSE PRACTITIONER

## 2017-05-27 PROCEDURE — 84100 ASSAY OF PHOSPHORUS: CPT

## 2017-05-27 PROCEDURE — 63600175 PHARM REV CODE 636 W HCPCS: Performed by: HOSPITALIST

## 2017-05-27 PROCEDURE — 63600175 PHARM REV CODE 636 W HCPCS: Performed by: INTERNAL MEDICINE

## 2017-05-27 PROCEDURE — 85007 BL SMEAR W/DIFF WBC COUNT: CPT

## 2017-05-27 PROCEDURE — 25000003 PHARM REV CODE 250: Performed by: INTERNAL MEDICINE

## 2017-05-27 PROCEDURE — 83735 ASSAY OF MAGNESIUM: CPT

## 2017-05-27 PROCEDURE — 86850 RBC ANTIBODY SCREEN: CPT

## 2017-05-27 PROCEDURE — 85027 COMPLETE CBC AUTOMATED: CPT

## 2017-05-27 PROCEDURE — 25000003 PHARM REV CODE 250: Performed by: HOSPITALIST

## 2017-05-27 PROCEDURE — 83615 LACTATE (LD) (LDH) ENZYME: CPT

## 2017-05-27 PROCEDURE — 11000001 HC ACUTE MED/SURG PRIVATE ROOM

## 2017-05-27 PROCEDURE — 99232 SBSQ HOSP IP/OBS MODERATE 35: CPT | Mod: ,,, | Performed by: INTERNAL MEDICINE

## 2017-05-27 PROCEDURE — 86900 BLOOD TYPING SEROLOGIC ABO: CPT

## 2017-05-27 RX ORDER — OXYCODONE HYDROCHLORIDE 5 MG/1
15 TABLET ORAL EVERY 4 HOURS PRN
Status: DISCONTINUED | OUTPATIENT
Start: 2017-05-27 | End: 2017-05-31 | Stop reason: HOSPADM

## 2017-05-27 RX ORDER — CYCLOBENZAPRINE HCL 5 MG
5 TABLET ORAL 3 TIMES DAILY PRN
Status: DISCONTINUED | OUTPATIENT
Start: 2017-05-27 | End: 2017-05-27

## 2017-05-27 RX ORDER — OXYCODONE HYDROCHLORIDE 5 MG/1
20 TABLET ORAL EVERY 6 HOURS PRN
Status: DISCONTINUED | OUTPATIENT
Start: 2017-05-27 | End: 2017-05-31 | Stop reason: HOSPADM

## 2017-05-27 RX ORDER — HYDROMORPHONE HYDROCHLORIDE 1 MG/ML
2.5 INJECTION, SOLUTION INTRAMUSCULAR; INTRAVENOUS; SUBCUTANEOUS
Status: DISCONTINUED | OUTPATIENT
Start: 2017-05-27 | End: 2017-05-29

## 2017-05-27 RX ADMIN — CYCLOBENZAPRINE HYDROCHLORIDE 10 MG: 10 TABLET, FILM COATED ORAL at 09:05

## 2017-05-27 RX ADMIN — CLONAZEPAM 1 MG: 0.5 TABLET ORAL at 09:05

## 2017-05-27 RX ADMIN — HYDROMORPHONE HYDROCHLORIDE 2.5 MG: 1 INJECTION, SOLUTION INTRAMUSCULAR; INTRAVENOUS; SUBCUTANEOUS at 11:05

## 2017-05-27 RX ADMIN — LOPERAMIDE HYDROCHLORIDE 2 MG: 2 CAPSULE ORAL at 08:05

## 2017-05-27 RX ADMIN — OXYCODONE HYDROCHLORIDE 15 MG: 5 TABLET ORAL at 10:05

## 2017-05-27 RX ADMIN — HYDROMORPHONE HYDROCHLORIDE 2.5 MG: 1 INJECTION, SOLUTION INTRAMUSCULAR; INTRAVENOUS; SUBCUTANEOUS at 05:05

## 2017-05-27 RX ADMIN — MORPHINE SULFATE 30 MG: 30 TABLET, EXTENDED RELEASE ORAL at 08:05

## 2017-05-27 RX ADMIN — OXYCODONE HYDROCHLORIDE 15 MG: 5 TABLET ORAL at 04:05

## 2017-05-27 RX ADMIN — CITALOPRAM HYDROBROMIDE 20 MG: 20 TABLET ORAL at 10:05

## 2017-05-27 RX ADMIN — CEFEPIME HYDROCHLORIDE 2 G: 2 INJECTION, SOLUTION INTRAVENOUS at 06:05

## 2017-05-27 RX ADMIN — OXYCODONE HYDROCHLORIDE 10 MG: 5 TABLET ORAL at 12:05

## 2017-05-27 RX ADMIN — MORPHINE SULFATE 30 MG: 30 TABLET, EXTENDED RELEASE ORAL at 09:05

## 2017-05-27 RX ADMIN — ZOLPIDEM TARTRATE 5 MG: 5 TABLET, FILM COATED ORAL at 09:05

## 2017-05-27 RX ADMIN — HYDROMORPHONE HYDROCHLORIDE 2.5 MG: 1 INJECTION, SOLUTION INTRAMUSCULAR; INTRAVENOUS; SUBCUTANEOUS at 02:05

## 2017-05-27 RX ADMIN — STANDARDIZED SENNA CONCENTRATE AND DOCUSATE SODIUM 1 TABLET: 8.6; 5 TABLET, FILM COATED ORAL at 08:05

## 2017-05-27 RX ADMIN — BUSPIRONE HYDROCHLORIDE 15 MG: 5 TABLET ORAL at 05:05

## 2017-05-27 RX ADMIN — HYDROMORPHONE HYDROCHLORIDE 2 MG: 1 INJECTION, SOLUTION INTRAMUSCULAR; INTRAVENOUS; SUBCUTANEOUS at 05:05

## 2017-05-27 RX ADMIN — HYDROMORPHONE HYDROCHLORIDE 2 MG: 1 INJECTION, SOLUTION INTRAMUSCULAR; INTRAVENOUS; SUBCUTANEOUS at 01:05

## 2017-05-27 RX ADMIN — ZIPRASIDONE HYDROCHLORIDE 40 MG: 40 CAPSULE ORAL at 05:05

## 2017-05-27 RX ADMIN — HYDROMORPHONE HYDROCHLORIDE 2.5 MG: 1 INJECTION, SOLUTION INTRAMUSCULAR; INTRAVENOUS; SUBCUTANEOUS at 08:05

## 2017-05-27 RX ADMIN — OXYCODONE HYDROCHLORIDE 15 MG: 5 TABLET ORAL at 08:05

## 2017-05-27 RX ADMIN — OXYCODONE HYDROCHLORIDE 15 MG: 5 TABLET ORAL at 03:05

## 2017-05-27 RX ADMIN — LUBIPROSTONE 24 MCG: 24 CAPSULE, GELATIN COATED ORAL at 08:05

## 2017-05-27 RX ADMIN — BUSPIRONE HYDROCHLORIDE 15 MG: 5 TABLET ORAL at 09:05

## 2017-05-27 RX ADMIN — BUSPIRONE HYDROCHLORIDE 15 MG: 5 TABLET ORAL at 02:05

## 2017-05-27 RX ADMIN — CLONAZEPAM 1 MG: 0.5 TABLET ORAL at 08:05

## 2017-05-27 RX ADMIN — HYDROMORPHONE HYDROCHLORIDE 2 MG: 1 INJECTION, SOLUTION INTRAMUSCULAR; INTRAVENOUS; SUBCUTANEOUS at 08:05

## 2017-05-27 RX ADMIN — ZIPRASIDONE HYDROCHLORIDE 40 MG: 40 CAPSULE ORAL at 08:05

## 2017-05-27 NOTE — ASSESSMENT & PLAN NOTE
- Blood cx and bone sample with serratia   - bloopd cx cleared on 5/25  - since she has clear blood cxs and is afebrile x 48 hrs can transition her to cipro 750 mg po BID to complete her therapy  -will needs 6 weeks of cipro from 5/25 (since she has diffuse osteo may need to extend)  -please arrange for ID follow up in 3-4 weeks  -needs weekly cbc, crp, sed rate, and chem 7 while on therapy. Please fax results to ID clinic

## 2017-05-27 NOTE — SUBJECTIVE & OBJECTIVE
Interval History: No events    Review of Systems   Constitutional: Positive for appetite change, fatigue and unexpected weight change. Negative for chills and fever.   HENT: Negative for congestion.    Respiratory: Negative for cough and shortness of breath.    Gastrointestinal: Positive for diarrhea. Negative for abdominal pain and nausea.   Genitourinary: Negative for dysuria.   Musculoskeletal: Positive for arthralgias.   Skin: Negative for rash.   Neurological: Negative for headaches.   Psychiatric/Behavioral: Negative for sleep disturbance.     Objective:     Vital Signs (Most Recent):  Temp: 97.9 °F (36.6 °C) (05/27/17 0800)  Pulse: 97 (05/27/17 0800)  Resp: 16 (05/27/17 0800)  BP: 109/65 (05/27/17 0800)  SpO2: 99 % (05/27/17 0800) Vital Signs (24h Range):  Temp:  [97.6 °F (36.4 °C)-98 °F (36.7 °C)] 97.9 °F (36.6 °C)  Pulse:  [] 97  Resp:  [13-16] 16  SpO2:  [96 %-99 %] 99 %  BP: (109-140)/(65-97) 109/65     Weight: 76.6 kg (168 lb 14 oz)  Body mass index is 28.99 kg/m².    Estimated Creatinine Clearance: 119.9 mL/min (based on Cr of 0.6).    Physical Exam   Constitutional: She is oriented to person, place, and time. She appears well-developed and well-nourished. No distress.   HENT:   Head: Normocephalic and atraumatic.   Eyes: EOM are normal. Pupils are equal, round, and reactive to light.   Neck: Normal range of motion. Neck supple.   Cardiovascular: Normal rate, regular rhythm, normal heart sounds and intact distal pulses.    Pulmonary/Chest:   Left subclavian port without surrounding tenderness, erythema or edema   Abdominal: Soft. Bowel sounds are normal. She exhibits no distension. There is no tenderness.   Musculoskeletal: Normal range of motion. She exhibits tenderness. She exhibits no edema or deformity.   Pain x4 extremities, L arm > R arm; BLE pain greatest just distal to knees   Neurological: She is alert and oriented to person, place, and time.   Skin: Skin is warm. Capillary refill takes  less than 2 seconds.   Psychiatric: She has a normal mood and affect. Her behavior is normal.   Nursing note and vitals reviewed.      Significant Labs: All pertinent labs within the past 24 hours have been reviewed.    Significant Imaging: I have reviewed all pertinent imaging results/findings within the past 24 hours.

## 2017-05-27 NOTE — PROGRESS NOTES
Nursing Transfer Note      5/26/2017     Transfer To: 1104    Transfer via wheelchair    Transfer with cardiac monitoring    Transported by RN     Medicines sent: NO     Chart send with patient: Yes      Upon arrival to floor: patient oriented to room, call bell in reach and bed in lowest position

## 2017-05-27 NOTE — PROGRESS NOTES
"Progress Note  Hospital Medicine      Admit Date: 5/22/2017   [facility] [place] [facility]    SUBJECTIVE:     Follow-up For:  Osteonecrosis     Patient is a 46 y.o. female with significant past medical history of HTN and sickle cell dz was transferred from [facility]. Pt had originally presented to the hospital c/o sickle cell crisis; specifically bilateral arm pain (L > R) for two weeks. Pt thought pain may have been related to a fall she sustained approximately 1 month ago.  She also endorses h/o fever, chills and body aches. While admitted the pt began having fevers and Bcx grew out GNR (initally tx with cefepime, now on meropenem). BUE xrays are showing periosteal dz concerning for possible osteomyelitis vs metastatic dz. Prior to admission pt had port in place 2/2 poor venous access.      Latest labs at OSH revealed WBC 7.1, anemia (H/H 6.3/19), retic count 4.7, mild hypokalemia (K 3.1), mild hypomagnesemia (Mag 1.5), ESR & CRP elevated (118 and 103 respectively), lactic 1.1; INR 1.4, UA neg for infection, UPT neg. Bcx preliminary results showing GNR in both aerobic and anaerobic bottles. Xray LUE  showed "osseous erosive lesions within the mid humeral diaphysis bone marrow with additional destructive lesions noted in the proximal radial diaphysis. Shoulder and elbow joints intact. Findings concerning for metastatic disease to humerus and proximal radius." Xray RUE revealed "destructive mauro lesion within mid humeral diaphyseal bone marrow. Destructive lesions additional seening involiving radius & ulna. Should and elbow joints intact." Pt also had MRI of left shoulder performed revealing changes consistent with "early osteonecrosis along superior and medial aspect of left humeral head."  CXRY was neg for acute cardiopulmonary process. Pt was originally supposed to be transfused 3 units PRBCs, however pt has h/o antibodies with difficulty matching blood and had a questionable reaction to one of the units. " It is unclear from chart how many units were received. Per OSH records, the pt's port was d/c'd 2/2 bacteremia and PICC was placed, however it appears that this did not occur and pt's port is still in place. L shoulder arthrocentesis was performed, gram stain without organisms or WBCs.         Hospital Course:  Patient admitted to hospital medicine with infectious disease, orthopedics, and hematology/oncology consults. Contacted OSH for further culture results, patient is growing serratia, sensitive to cipro, ceftriaxone, cefepime, bactrim, amikacin. Resistant to cefazolin. Intermediate to cefoxitin, tobra.  5/23: Pt had xray, then MRI of right forearm with concern for possible multi-focal osteomyelitis, possible septic joint. Repeat blood cultures drawn here growing gram negative rods. Fever and tachycardia overnight around the time of blood transfusion.  5/24: Since repeat blood cultures still +, general surgery was consulted and removed the patient's port. Sensitivities here result as pan-sensitive serratia  5/25: IR performed bone biopsy of osteomyelitis as well as joint aspiration of right elbow. In addition to those cultures, repeat blood cultures were drawn to assess for clearance.  5/26: Port catheter tip culture growing gram negative marjorie. So are 2 of the IR bone biopsies. Repeat blood cultures so far negative x1 day.     Interval History: NAEON. This morning, pt reports increased pain at site of IR biopsies to right arm, requesting increased pain medication. Pt also reports back pain after returning from IR, reports she was laying on her back for a while there. Otherwise, this morning denies fevers/chills, no CP/SOB, still diarrhea ~2x/day. No dysuria. No nausea/vomit/dizziness/lightheadedness/numbness/tingling. No other new complaints.    OBJECTIVE:     Body mass index is 28.99 kg/m².    Vital Signs Range (Last 24H):  Temp:  [97.3 °F (36.3 °C)-98 °F (36.7 °C)]   Pulse:  [82-98]   Resp:  [13-16]   BP:  (109-140)/(65-97)   SpO2:  [98 %-99 %]     I & O (Last 24H):    Intake/Output Summary (Last 24 hours) at 05/27/17 1542  Last data filed at 05/27/17 1115   Gross per 24 hour   Intake             8325 ml   Output                0 ml   Net             8325 ml        Physical Exam:  Constitutional: She is oriented to person, place, and time. She appears well-developed and well-nourished. No distress.   HENT:   Head: Normocephalic and atraumatic.   Eyes: EOM are normal. Pupils are equal, round, and reactive to light.   Neck: Normal range of motion. Neck supple.   Cardiovascular: Normal rate, regular rhythm, normal heart sounds and intact distal pulses.    Pulmonary/Chest: Effort normal and breath sounds normal. No respiratory distress. She has no wheezes. She has no rales. She exhibits no tenderness.   S/p port removal from left subclavian   Abdominal: Soft. Bowel sounds are normal. She exhibits no distension. There is no tenderness.   Musculoskeletal: Normal range of motion. She exhibits tenderness. She exhibits no edema or deformity.   Spine without tenderness to palpation   Neurological: She is alert and oriented to person, place, and time.   Skin: Skin is warm and dry. She is not diaphoretic.   Psychiatric: Her behavior is normal.   Nursing note and vitals reviewed.      Recent Labs  Lab 05/25/17 0639 05/26/17 0630 05/27/17  0457    139 141   K 3.5 2.9* 3.8    107 109   CO2 27 25 28   BUN 8 7 4*   CREATININE 0.6 0.7 0.6   * 119* 83   CALCIUM 8.3* 8.0* 8.0*   MG 2.3 1.8 1.9   PHOS 2.8 3.2 3.4       Recent Labs  Lab 05/23/17  0012 05/23/17  0234 05/24/17  0355   ALKPHOS 154*  --   --    ALT 23  --   --    AST 39  --   --    ALBUMIN 2.0*  --   --    PROT 7.4  --   --    BILITOT 1.3*  --  1.8*   INR  --  1.3*  --          Recent Labs  Lab 05/25/17  0639 05/26/17  0630 05/27/17  0457   WBC 7.25 5.23 5.18   HGB 6.5* 6.3* 6.2*   HCT 19.1* 19.0* 19.7*   * 145* 181   GRAN 82.2*  6.0 70.0 71.0    LYMPH 11.6*  0.8* 24.0  Test Not Performed 19.0  Test Not Performed   MONO 5.8  0.4 6.0  Test Not Performed 7.0  Test Not Performed         busPIRone 15 mg Oral TID   ceFEPime (MAXIPIME) IVPB 2 g Intravenous Q12H   citalopram 20 mg Oral Daily   clonazePAM 1 mg Oral BID   lubiprostone 24 mcg Oral BID WM   morphine 30 mg Oral Q12H   senna-docusate 8.6-50 mg 1 tablet Oral BID   ziprasidone 40 mg Oral BID WM        As Needed sodium chloride, acetaminophen, cyclobenzaprine, HYDROmorphone, loperamide, ondansetron, ondansetron, oxycodone, oxycodone, zolpidem    ASSESSMENT/PLAN:     Assessment: Yamila Lan is a 46 y.o. female here with:     Active Hospital Problems    Diagnosis  POA    *Osteonecrosis [M87.9]  Yes    Diarrhea [R19.7]  Yes    Tachycardia [R00.0]  Unknown    Sepsis [A41.9]  Yes    Acute hematogenous osteomyelitis [M86.00]  Unknown    Pyogenic arthritis of right wrist [M00.9]  Unknown    Gram-negative bacteremia [R78.81]  Yes    Sickle cell crisis [D57.00]  Yes      Resolved Hospital Problems    Diagnosis Date Resolved POA    Hypokalemia [E87.6] 05/25/2017 Yes    Hypomagnesemia [E83.42] 05/25/2017 Yes    Hypophosphatemia [E83.39] 05/25/2017 Yes        Plan:   Gram-negative bacteremia     - Bcx     - Serratia, sensitive to ceftriaxone, cipro, cefepime, bactrim. Resistant to cefazolin, intermidate to tobra and cefoxitin.  - repeat blood cx here also with serratia, pan-sensitive  -- potential sources include port and osteomyelitis.    - s/p port removal 5/24 with gen surg, appreciate their assistance   - patient's MRI right arm with multiple lesions concerning for osteomyelitis, along with possible septic joint     - s/p IR bone biopsy and joint aspiration 5/25  - Many cultures still pending    - So far: Catheter tip with gram negative rods    - bone biopsy x2 with gram negative rods    - repeat blood cx negative so far  -- ID consult reviewed. If remains afebrile overnight and cultures remain  clear will transition to oral cipro          Acute hematogenous osteomyelitis     Xray bilateral upper extremity with multiple abnormalities, R>L, f/u MRI right arm with multifocal lesions concerning for osteomyelitis   - Initial theory is that patient potentially had osteonecrosis from her sickle cell to begin with, then became bacteremic from her port, which then seeded the previously damaged areas of bone leading to osteomyelitis   - appreciate ortho consult             Sickle cell crisis     - H/H 6.3/18 at OSH, no improvement with transfusion 1 unit PRBCs   - with hx sickle cell disease, pt reports her hemoglobin is usually around 7    - 7.4 -> 6.5 ->6.3 today    - will continue to monitor, but likely not attempt transfusion again unless hemoglobin reaches ~6 (or worsening SOB, or CP)     - will order type and screen with tomorrow's morning labs in case xfusion needed. Consider pre-medication with tylenol/PO benadryl if transfusing   - pt does not appear to have acute chest at this time   - pain control (pt requests q2 hours IV dilaudid, however in the afternoons patient tends to become somnolent. For today though will increase dilaudid to 1.5 mg q3 hours   - hem/onc also consulted, appreciate their recs          Diarrhea     c dif negative   - electrolyte abnormalities   - improving slightly, but still a few times a day   - will treat symptomatically with loperamide    - was ordered, but not given. Will attempt to use the PRN medication today for her symptoms and evaluate for response.          Sepsis     Per gram negative bacteremia section          Tachycardia     Most likely 2/2 sepsis, bacteremia   - Will continue to treat the sepsis          * Osteonecrosis     - BUE pain with evidence of osteonecrosis on xrays concerning for osteomyelitis vs malignancy, vs osteonecrosis 2/2 sickle cell   - MRI concerning for osteomyelitis   - management per above                Amanda Heredia MD

## 2017-05-27 NOTE — PROGRESS NOTES
[facility]  Infectious Disease  Progress Note    Patient Name: Yamila Lan  MRN: [id number]  Admission Date: 5/22/2017  Length of Stay: 5 days  Attending Physician: Amanda Heredia MD  Primary Care Provider: Silvana Dhaliwal MD    Isolation Status: No active isolations  Assessment/Plan:      Gram-negative bacteremia    - Blood cx and bone sample with serratia   - bloopd cx cleared on 5/25  - since she has clear blood cxs and is afebrile x 48 hrs can transition her to cipro 750 mg po BID to complete her therapy  -will needs 6 weeks of cipro from 5/25 (since she has diffuse osteo may need to extend)  -please arrange for ID follow up in 3-4 weeks  -needs weekly cbc, crp, sed rate, and chem 7 while on therapy. Please fax results to ID clinic             Thank you for your consult. I will sign off. Please contact us if you have any additional questions.    Yan Sylvester MD  Infectious Disease  [facility]    Subjective:     Principal Problem:Osteonecrosis    HPI: Ms. Lan is a 46 year old female with history of sickle cell disease who presented to [facility] complaining of bilateral arm pain L>R that had been persistent for about 4 weeks. She states that she experienced a fall and extended both arms out to catch her fall. She endorses a 15 pound unintentional weight loss since the beginning of the year but feels this is secondary to being hospitalized so much more frequently since January. Patient denied fevers chills, night sweats.      At the OSH, she developed fever after receiving a blood transfusion. Blood cultures drawn 5/21 grew gram negative rods consistent with serratia. BLUE X rays were concerning for erosive bone process with osteonecrosis vs metastatic dz per outside report. Patient has had a port in place for five years. She denies tenderness or erythema at this site. She is hesitant to have port removed. Has had diarrhea since starting abx for positive  blood cultures and is C. Diff negative. She denies every having bacteremia or serious infectious in the past.   Interval History: No events    Review of Systems   Constitutional: Positive for appetite change, fatigue and unexpected weight change. Negative for chills and fever.   HENT: Negative for congestion.    Respiratory: Negative for cough and shortness of breath.    Gastrointestinal: Positive for diarrhea. Negative for abdominal pain and nausea.   Genitourinary: Negative for dysuria.   Musculoskeletal: Positive for arthralgias.   Skin: Negative for rash.   Neurological: Negative for headaches.   Psychiatric/Behavioral: Negative for sleep disturbance.     Objective:     Vital Signs (Most Recent):  Temp: 97.9 °F (36.6 °C) (05/27/17 0800)  Pulse: 97 (05/27/17 0800)  Resp: 16 (05/27/17 0800)  BP: 109/65 (05/27/17 0800)  SpO2: 99 % (05/27/17 0800) Vital Signs (24h Range):  Temp:  [97.6 °F (36.4 °C)-98 °F (36.7 °C)] 97.9 °F (36.6 °C)  Pulse:  [] 97  Resp:  [13-16] 16  SpO2:  [96 %-99 %] 99 %  BP: (109-140)/(65-97) 109/65     Weight: 76.6 kg (168 lb 14 oz)  Body mass index is 28.99 kg/m².    Estimated Creatinine Clearance: 119.9 mL/min (based on Cr of 0.6).    Physical Exam   Constitutional: She is oriented to person, place, and time. She appears well-developed and well-nourished. No distress.   HENT:   Head: Normocephalic and atraumatic.   Eyes: EOM are normal. Pupils are equal, round, and reactive to light.   Neck: Normal range of motion. Neck supple.   Cardiovascular: Normal rate, regular rhythm, normal heart sounds and intact distal pulses.    Pulmonary/Chest:   Left subclavian port without surrounding tenderness, erythema or edema   Abdominal: Soft. Bowel sounds are normal. She exhibits no distension. There is no tenderness.   Musculoskeletal: Normal range of motion. She exhibits tenderness. She exhibits no edema or deformity.   Pain x4 extremities, L arm > R arm; BLE pain greatest just distal to knees    Neurological: She is alert and oriented to person, place, and time.   Skin: Skin is warm. Capillary refill takes less than 2 seconds.   Psychiatric: She has a normal mood and affect. Her behavior is normal.   Nursing note and vitals reviewed.      Significant Labs: All pertinent labs within the past 24 hours have been reviewed.    Significant Imaging: I have reviewed all pertinent imaging results/findings within the past 24 hours.

## 2017-05-28 LAB
ANION GAP SERPL CALC-SCNC: 5 MMOL/L
ANISOCYTOSIS BLD QL SMEAR: SLIGHT
BACTERIA BLD CULT: NORMAL
BASOPHILS # BLD AUTO: ABNORMAL K/UL
BASOPHILS NFR BLD: 0 %
BUN SERPL-MCNC: 5 MG/DL
CALCIUM SERPL-MCNC: 7.9 MG/DL
CHLORIDE SERPL-SCNC: 107 MMOL/L
CO2 SERPL-SCNC: 29 MMOL/L
CREAT SERPL-MCNC: 0.6 MG/DL
DIFFERENTIAL METHOD: ABNORMAL
EOSINOPHIL # BLD AUTO: ABNORMAL K/UL
EOSINOPHIL NFR BLD: 1 %
ERYTHROCYTE [DISTWIDTH] IN BLOOD BY AUTOMATED COUNT: 26.4 %
EST. GFR  (AFRICAN AMERICAN): >60 ML/MIN/1.73 M^2
EST. GFR  (NON AFRICAN AMERICAN): >60 ML/MIN/1.73 M^2
GLUCOSE SERPL-MCNC: 81 MG/DL
HCT VFR BLD AUTO: 20.3 %
HGB BLD-MCNC: 6.5 G/DL
HYPOCHROMIA BLD QL SMEAR: ABNORMAL
LDH SERPL L TO P-CCNC: 405 U/L
LYMPHOCYTES # BLD AUTO: ABNORMAL K/UL
LYMPHOCYTES NFR BLD: 12 %
MAGNESIUM SERPL-MCNC: 1.7 MG/DL
MCH RBC QN AUTO: 24.3 PG
MCHC RBC AUTO-ENTMCNC: 32 %
MCV RBC AUTO: 76 FL
METAMYELOCYTES NFR BLD MANUAL: 1 %
MONOCYTES # BLD AUTO: ABNORMAL K/UL
MONOCYTES NFR BLD: 3 %
MYELOCYTES NFR BLD MANUAL: 2 %
NEUTROPHILS NFR BLD: 79 %
NEUTS BAND NFR BLD MANUAL: 2 %
PHOSPHATE SERPL-MCNC: 3.8 MG/DL
PLATELET # BLD AUTO: 205 K/UL
PLATELET BLD QL SMEAR: ABNORMAL
PMV BLD AUTO: 9.5 FL
POTASSIUM SERPL-SCNC: 3.7 MMOL/L
RBC # BLD AUTO: 2.67 M/UL
SODIUM SERPL-SCNC: 141 MMOL/L
WBC # BLD AUTO: 4.78 K/UL

## 2017-05-28 PROCEDURE — 36415 COLL VENOUS BLD VENIPUNCTURE: CPT

## 2017-05-28 PROCEDURE — 11000001 HC ACUTE MED/SURG PRIVATE ROOM

## 2017-05-28 PROCEDURE — 63600175 PHARM REV CODE 636 W HCPCS: Performed by: INTERNAL MEDICINE

## 2017-05-28 PROCEDURE — 80048 BASIC METABOLIC PNL TOTAL CA: CPT

## 2017-05-28 PROCEDURE — 85007 BL SMEAR W/DIFF WBC COUNT: CPT

## 2017-05-28 PROCEDURE — 25000003 PHARM REV CODE 250: Performed by: NURSE PRACTITIONER

## 2017-05-28 PROCEDURE — 85027 COMPLETE CBC AUTOMATED: CPT

## 2017-05-28 PROCEDURE — 63600175 PHARM REV CODE 636 W HCPCS: Performed by: HOSPITALIST

## 2017-05-28 PROCEDURE — 25000003 PHARM REV CODE 250: Performed by: INTERNAL MEDICINE

## 2017-05-28 PROCEDURE — 84100 ASSAY OF PHOSPHORUS: CPT

## 2017-05-28 PROCEDURE — 83735 ASSAY OF MAGNESIUM: CPT

## 2017-05-28 PROCEDURE — 25000003 PHARM REV CODE 250: Performed by: HOSPITALIST

## 2017-05-28 PROCEDURE — 83615 LACTATE (LD) (LDH) ENZYME: CPT

## 2017-05-28 PROCEDURE — 99232 SBSQ HOSP IP/OBS MODERATE 35: CPT | Mod: ,,, | Performed by: HOSPITALIST

## 2017-05-28 RX ORDER — FLUCONAZOLE 150 MG/1
150 TABLET ORAL DAILY
Status: COMPLETED | OUTPATIENT
Start: 2017-05-28 | End: 2017-05-30

## 2017-05-28 RX ORDER — CIPROFLOXACIN 750 MG/1
750 TABLET, FILM COATED ORAL EVERY 12 HOURS
Status: DISCONTINUED | OUTPATIENT
Start: 2017-05-28 | End: 2017-05-31 | Stop reason: HOSPADM

## 2017-05-28 RX ADMIN — BUSPIRONE HYDROCHLORIDE 15 MG: 5 TABLET ORAL at 05:05

## 2017-05-28 RX ADMIN — CLONAZEPAM 1 MG: 0.5 TABLET ORAL at 08:05

## 2017-05-28 RX ADMIN — OXYCODONE HYDROCHLORIDE 15 MG: 5 TABLET ORAL at 07:05

## 2017-05-28 RX ADMIN — HYDROMORPHONE HYDROCHLORIDE 2.5 MG: 1 INJECTION, SOLUTION INTRAMUSCULAR; INTRAVENOUS; SUBCUTANEOUS at 08:05

## 2017-05-28 RX ADMIN — HYDROMORPHONE HYDROCHLORIDE 2.5 MG: 1 INJECTION, SOLUTION INTRAMUSCULAR; INTRAVENOUS; SUBCUTANEOUS at 07:05

## 2017-05-28 RX ADMIN — CIPROFLOXACIN HYDROCHLORIDE 750 MG: 750 TABLET, FILM COATED ORAL at 07:05

## 2017-05-28 RX ADMIN — MORPHINE SULFATE 30 MG: 30 TABLET, EXTENDED RELEASE ORAL at 08:05

## 2017-05-28 RX ADMIN — BUSPIRONE HYDROCHLORIDE 15 MG: 5 TABLET ORAL at 03:05

## 2017-05-28 RX ADMIN — CITALOPRAM HYDROBROMIDE 20 MG: 20 TABLET ORAL at 08:05

## 2017-05-28 RX ADMIN — CLONAZEPAM 1 MG: 0.5 TABLET ORAL at 07:05

## 2017-05-28 RX ADMIN — HYDROMORPHONE HYDROCHLORIDE 2.5 MG: 1 INJECTION, SOLUTION INTRAMUSCULAR; INTRAVENOUS; SUBCUTANEOUS at 10:05

## 2017-05-28 RX ADMIN — CIPROFLOXACIN HYDROCHLORIDE 750 MG: 750 TABLET, FILM COATED ORAL at 03:05

## 2017-05-28 RX ADMIN — BUSPIRONE HYDROCHLORIDE 15 MG: 5 TABLET ORAL at 07:05

## 2017-05-28 RX ADMIN — HYDROMORPHONE HYDROCHLORIDE 2.5 MG: 1 INJECTION, SOLUTION INTRAMUSCULAR; INTRAVENOUS; SUBCUTANEOUS at 02:05

## 2017-05-28 RX ADMIN — ZOLPIDEM TARTRATE 5 MG: 5 TABLET, FILM COATED ORAL at 07:05

## 2017-05-28 RX ADMIN — HYDROMORPHONE HYDROCHLORIDE 2.5 MG: 1 INJECTION, SOLUTION INTRAMUSCULAR; INTRAVENOUS; SUBCUTANEOUS at 12:05

## 2017-05-28 RX ADMIN — CYCLOBENZAPRINE HYDROCHLORIDE 10 MG: 10 TABLET, FILM COATED ORAL at 08:05

## 2017-05-28 RX ADMIN — MORPHINE SULFATE 30 MG: 30 TABLET, EXTENDED RELEASE ORAL at 07:05

## 2017-05-28 RX ADMIN — ZIPRASIDONE HYDROCHLORIDE 40 MG: 40 CAPSULE ORAL at 04:05

## 2017-05-28 RX ADMIN — HYDROMORPHONE HYDROCHLORIDE 2.5 MG: 1 INJECTION, SOLUTION INTRAMUSCULAR; INTRAVENOUS; SUBCUTANEOUS at 05:05

## 2017-05-28 RX ADMIN — OXYCODONE HYDROCHLORIDE 15 MG: 5 TABLET ORAL at 03:05

## 2017-05-28 RX ADMIN — HYDROMORPHONE HYDROCHLORIDE 2.5 MG: 1 INJECTION, SOLUTION INTRAMUSCULAR; INTRAVENOUS; SUBCUTANEOUS at 04:05

## 2017-05-28 RX ADMIN — CEFEPIME HYDROCHLORIDE 2 G: 2 INJECTION, SOLUTION INTRAVENOUS at 05:05

## 2017-05-28 RX ADMIN — FLUCONAZOLE 150 MG: 150 TABLET ORAL at 03:05

## 2017-05-28 RX ADMIN — ZIPRASIDONE HYDROCHLORIDE 40 MG: 40 CAPSULE ORAL at 08:05

## 2017-05-28 RX ADMIN — OXYCODONE HYDROCHLORIDE 15 MG: 5 TABLET ORAL at 05:05

## 2017-05-28 NOTE — PLAN OF CARE
Problem: Fall Risk (Adult)  Goal: Absence of Falls  Patient will demonstrate the desired outcomes by discharge/transition of care.   Outcome: Ongoing (interventions implemented as appropriate)  Patient remained free of accidental injury.

## 2017-05-28 NOTE — PROGRESS NOTES
"Progress Note  Hospital Medicine      Admit Date: 5/22/2017   [facility]    SUBJECTIVE:     Follow-up For:  Osteonecrosis     Patient is a 46 y.o. female with significant past medical history of HTN and sickle cell dz was transferred from [facility]. Pt had originally presented to the hospital c/o sickle cell crisis; specifically bilateral arm pain (L > R) for two weeks. Pt thought pain may have been related to a fall she sustained approximately 1 month ago.  She also endorses h/o fever, chills and body aches. While admitted the pt began having fevers and Bcx grew out GNR (initally tx with cefepime, now on meropenem). BUE xrays are showing periosteal dz concerning for possible osteomyelitis vs metastatic dz. Prior to admission pt had port in place 2/2 poor venous access.      Latest labs at OSH revealed WBC 7.1, anemia (H/H 6.3/19), retic count 4.7, mild hypokalemia (K 3.1), mild hypomagnesemia (Mag 1.5), ESR & CRP elevated (118 and 103 respectively), lactic 1.1; INR 1.4, UA neg for infection, UPT neg. Bcx preliminary results showing GNR in both aerobic and anaerobic bottles. Xray LUE  showed "osseous erosive lesions within the mid humeral diaphysis bone marrow with additional destructive lesions noted in the proximal radial diaphysis. Shoulder and elbow joints intact. Findings concerning for metastatic disease to humerus and proximal radius." Xray RUE revealed "destructive mauro lesion within mid humeral diaphyseal bone marrow. Destructive lesions additional seening involiving radius & ulna. Should and elbow joints intact." Pt also had MRI of left shoulder performed revealing changes consistent with "early osteonecrosis along superior and medial aspect of left humeral head."  CXRY was neg for acute cardiopulmonary process. Pt was originally supposed to be transfused 3 units PRBCs, however pt has h/o antibodies with difficulty matching blood and had a questionable reaction to one of the units. " It is unclear from chart how many units were received. Per OSH records, the pt's port was d/c'd 2/2 bacteremia and PICC was placed, however it appears that this did not occur and pt's port is still in place. L shoulder arthrocentesis was performed, gram stain without organisms or WBCs.         Hospital Course:  Patient admitted to hospital medicine with infectious disease, orthopedics, and hematology/oncology consults. Contacted OSH for further culture results, patient is growing serratia, sensitive to cipro, ceftriaxone, cefepime, bactrim, amikacin. Resistant to cefazolin. Intermediate to cefoxitin, tobra.  5/23: Pt had xray, then MRI of right forearm with concern for possible multi-focal osteomyelitis, possible septic joint. Repeat blood cultures drawn here growing gram negative rods. Fever and tachycardia overnight around the time of blood transfusion.  5/24: Since repeat blood cultures still +, general surgery was consulted and removed the patient's port. Sensitivities here result as pan-sensitive serratia  5/25: IR performed bone biopsy of osteomyelitis as well as joint aspiration of right elbow. In addition to those cultures, repeat blood cultures were drawn to assess for clearance.  5/26: Port catheter tip culture growing gram negative marjorie. So are 2 of the IR bone biopsies. Repeat blood cultures so far negative x1 day.     Interval History: NAEON. This morning, pt reports increased pain at site of IR biopsies to right arm, requesting increased pain medication. Pt also reports back pain after returning from IR, reports she was laying on her back for a while there. Otherwise, this morning denies fevers/chills, no CP/SOB, still diarrhea ~2x/day. No dysuria. No nausea/vomit/dizziness/lightheadedness/numbness/tingling. No other new complaints.    OBJECTIVE:     Body mass index is 28.99 kg/m².    Vital Signs Range (Last 24H):  Temp:  [97.7 °F (36.5 °C)-98.5 °F (36.9 °C)]   Pulse:  []   Resp:  [16-18]   BP:  (108-142)/(69-87)   SpO2:  [97 %-100 %]     I & O (Last 24H):  No intake or output data in the 24 hours ending 05/28/17 1425     Physical Exam:  Constitutional: She is oriented to person, place, and time. She appears well-developed and well-nourished. No distress.   HENT:   Head: Normocephalic and atraumatic.   Eyes: EOM are normal. Pupils are equal, round, and reactive to light.   Neck: Normal range of motion. Neck supple.   Cardiovascular: Normal rate, regular rhythm, normal heart sounds and intact distal pulses.    Pulmonary/Chest: Effort normal and breath sounds normal. No respiratory distress. She has no wheezes. She has no rales. She exhibits no tenderness.   S/p port removal from left subclavian   Abdominal: Soft. Bowel sounds are normal. She exhibits no distension. There is no tenderness.   Musculoskeletal: Normal range of motion. She exhibits tenderness. She exhibits no edema or deformity.   Spine without tenderness to palpation   Neurological: She is alert and oriented to person, place, and time.   Skin: Skin is warm and dry. She is not diaphoretic.   Psychiatric: Her behavior is normal.   Nursing note and vitals reviewed.      Recent Labs  Lab 05/26/17  0630 05/27/17  0457 05/28/17  0501    141 141   K 2.9* 3.8 3.7    109 107   CO2 25 28 29   BUN 7 4* 5*   CREATININE 0.7 0.6 0.6   * 83 81   CALCIUM 8.0* 8.0* 7.9*   MG 1.8 1.9 1.7   PHOS 3.2 3.4 3.8       Recent Labs  Lab 05/23/17  0012 05/23/17  0234 05/24/17  0355   ALKPHOS 154*  --   --    ALT 23  --   --    AST 39  --   --    ALBUMIN 2.0*  --   --    PROT 7.4  --   --    BILITOT 1.3*  --  1.8*   INR  --  1.3*  --          Recent Labs  Lab 05/26/17  0630 05/27/17  0457 05/28/17  0501   WBC 5.23 5.18 4.78   HGB 6.3* 6.2* 6.5*   HCT 19.0* 19.7* 20.3*   * 181 205   GRAN 70.0 71.0 79.0*   LYMPH 24.0  Test Not Performed 19.0  Test Not Performed 12.0*  CANCELED   MONO 6.0  Test Not Performed 7.0  Test Not Performed 3.0*   CANCELED         busPIRone 15 mg Oral TID   ciprofloxacin HCl 750 mg Oral Q12H   citalopram 20 mg Oral Daily   clonazePAM 1 mg Oral BID   fluconazole 150 mg Oral Daily   lubiprostone 24 mcg Oral BID WM   morphine 30 mg Oral Q12H   senna-docusate 8.6-50 mg 1 tablet Oral BID   ziprasidone 40 mg Oral BID WM        As Needed sodium chloride, acetaminophen, cyclobenzaprine, HYDROmorphone, loperamide, ondansetron, ondansetron, oxycodone, oxycodone, zolpidem    ASSESSMENT/PLAN:     Assessment: Yamila Lan is a 46 y.o. female here with:     Active Hospital Problems    Diagnosis  POA    *Osteonecrosis [M87.9]  Yes    Diarrhea [R19.7]  Yes    Tachycardia [R00.0]  Unknown    Sepsis [A41.9]  Yes    Acute hematogenous osteomyelitis [M86.00]  Unknown    Pyogenic arthritis of right wrist [M00.9]  Unknown    Gram-negative bacteremia [R78.81]  Yes    Sickle cell crisis [D57.00]  Yes      Resolved Hospital Problems    Diagnosis Date Resolved POA    Hypokalemia [E87.6] 05/25/2017 Yes    Hypomagnesemia [E83.42] 05/25/2017 Yes    Hypophosphatemia [E83.39] 05/25/2017 Yes        Plan:   Gram-negative bacteremia     - Bcx     - Serratia, transition to oral cipro 750 bid per ID recs.   -- potential sources include port and osteomyelitis.    - s/p port removal 5/24 with gen surg, appreciate their assistance   - patient's MRI right arm with multiple lesions concerning for osteomyelitis, along with possible septic joint     - s/p IR bone biopsy and joint aspiration 5/25  - Many cultures still pending    - So far: Catheter tip with gram negative rods    - bone biopsy x2 with gram negative rods    - repeat blood cx negative so far  -- ID consult reviewed. If remains afebrile overnight and cultures remain clear will transition to oral cipro          Acute hematogenous osteomyelitis     Xray bilateral upper extremity with multiple abnormalities, R>L, f/u MRI right arm with multifocal lesions concerning for osteomyelitis   - Initial  theory is that patient potentially had osteonecrosis from her sickle cell to begin with, then became bacteremic from her port, which then seeded the previously damaged areas of bone leading to osteomyelitis   - appreciate ortho consult             Sickle cell crisis     - H/H 6.3/18 at OSH, no improvement with transfusion 1 unit PRBCs   - with hx sickle cell disease, pt reports her hemoglobin is usually around 7    - 7.4 -> 6.5 ->6.5 today    - will continue to monitor, but likely not attempt transfusion again unless hemoglobin reaches ~6 (or worsening SOB, or CP)     - will order type and screen with tomorrow's morning labs in case xfusion needed. Consider pre-medication with tylenol/PO benadryl if transfusing   - pt does not appear to have acute chest at this time   - pain control (pt requests q2 hours IV dilaudid, however in the afternoons patient tends to become somnolent. For today though will increase dilaudid to 1.5 mg q3 hours   - hem/onc also consulted, appreciate their recs          Diarrhea     c dif negative   - electrolyte abnormalities   - improving slightly, but still a few times a day   - will treat symptomatically with loperamide    - was ordered, but not given. Will attempt to use the PRN medication today for her symptoms and evaluate for response.          Sepsis     Per gram negative bacteremia section          Tachycardia     Most likely 2/2 sepsis, bacteremia   - Will continue to treat the sepsis          * Osteonecrosis     - BUE pain with evidence of osteonecrosis on xrays concerning for osteomyelitis vs malignancy, vs osteonecrosis 2/2 sickle cell   - MRI concerning for osteomyelitis   - management per above                Amanda Heredia MD

## 2017-05-29 LAB
ANION GAP SERPL CALC-SCNC: 6 MMOL/L
ANISOCYTOSIS BLD QL SMEAR: SLIGHT
BACTERIA SPEC AEROBE CULT: NO GROWTH
BACTERIA SPEC ANAEROBE CULT: NORMAL
BACTERIA SPEC ANAEROBE CULT: NORMAL
BASOPHILS # BLD AUTO: ABNORMAL K/UL
BASOPHILS NFR BLD: 0 %
BUN SERPL-MCNC: 5 MG/DL
CALCIUM SERPL-MCNC: 7.8 MG/DL
CHLORIDE SERPL-SCNC: 107 MMOL/L
CO2 SERPL-SCNC: 29 MMOL/L
CREAT SERPL-MCNC: 0.6 MG/DL
DACRYOCYTES BLD QL SMEAR: ABNORMAL
DIFFERENTIAL METHOD: ABNORMAL
EOSINOPHIL # BLD AUTO: ABNORMAL K/UL
EOSINOPHIL NFR BLD: 0 %
ERYTHROCYTE [DISTWIDTH] IN BLOOD BY AUTOMATED COUNT: 26.5 %
EST. GFR  (AFRICAN AMERICAN): >60 ML/MIN/1.73 M^2
EST. GFR  (NON AFRICAN AMERICAN): >60 ML/MIN/1.73 M^2
GLUCOSE SERPL-MCNC: 81 MG/DL
HCT VFR BLD AUTO: 19.1 %
HGB BLD-MCNC: 6.2 G/DL
HYPOCHROMIA BLD QL SMEAR: ABNORMAL
LDH SERPL L TO P-CCNC: 415 U/L
LYMPHOCYTES # BLD AUTO: ABNORMAL K/UL
LYMPHOCYTES NFR BLD: 27 %
MAGNESIUM SERPL-MCNC: 1.6 MG/DL
MCH RBC QN AUTO: 24.6 PG
MCHC RBC AUTO-ENTMCNC: 32.5 %
MCV RBC AUTO: 76 FL
MONOCYTES # BLD AUTO: ABNORMAL K/UL
MONOCYTES NFR BLD: 3 %
MYELOCYTES NFR BLD MANUAL: 3 %
NEUTROPHILS NFR BLD: 66 %
NEUTS BAND NFR BLD MANUAL: 1 %
PHOSPHATE SERPL-MCNC: 3.3 MG/DL
PLATELET # BLD AUTO: 212 K/UL
PLATELET BLD QL SMEAR: ABNORMAL
PMV BLD AUTO: 8.6 FL
POIKILOCYTOSIS BLD QL SMEAR: SLIGHT
POLYCHROMASIA BLD QL SMEAR: ABNORMAL
POTASSIUM SERPL-SCNC: 3.5 MMOL/L
RBC # BLD AUTO: 2.52 M/UL
SCHISTOCYTES BLD QL SMEAR: ABNORMAL
SODIUM SERPL-SCNC: 142 MMOL/L
WBC # BLD AUTO: 3.74 K/UL

## 2017-05-29 PROCEDURE — 25000003 PHARM REV CODE 250: Performed by: INTERNAL MEDICINE

## 2017-05-29 PROCEDURE — 83615 LACTATE (LD) (LDH) ENZYME: CPT

## 2017-05-29 PROCEDURE — 85007 BL SMEAR W/DIFF WBC COUNT: CPT

## 2017-05-29 PROCEDURE — 83735 ASSAY OF MAGNESIUM: CPT

## 2017-05-29 PROCEDURE — 11000001 HC ACUTE MED/SURG PRIVATE ROOM

## 2017-05-29 PROCEDURE — 25000003 PHARM REV CODE 250: Performed by: HOSPITALIST

## 2017-05-29 PROCEDURE — 84100 ASSAY OF PHOSPHORUS: CPT

## 2017-05-29 PROCEDURE — 25000003 PHARM REV CODE 250: Performed by: NURSE PRACTITIONER

## 2017-05-29 PROCEDURE — 36415 COLL VENOUS BLD VENIPUNCTURE: CPT

## 2017-05-29 PROCEDURE — 85027 COMPLETE CBC AUTOMATED: CPT

## 2017-05-29 PROCEDURE — 99231 SBSQ HOSP IP/OBS SF/LOW 25: CPT | Mod: GC,,, | Performed by: ORTHOPAEDIC SURGERY

## 2017-05-29 PROCEDURE — 80048 BASIC METABOLIC PNL TOTAL CA: CPT

## 2017-05-29 PROCEDURE — 63600175 PHARM REV CODE 636 W HCPCS: Performed by: HOSPITALIST

## 2017-05-29 RX ORDER — HYDROMORPHONE HYDROCHLORIDE 2 MG/ML
2 INJECTION, SOLUTION INTRAMUSCULAR; INTRAVENOUS; SUBCUTANEOUS
Status: DISCONTINUED | OUTPATIENT
Start: 2017-05-29 | End: 2017-05-31 | Stop reason: HOSPADM

## 2017-05-29 RX ORDER — HYDROMORPHONE HYDROCHLORIDE 2 MG/ML
2 INJECTION, SOLUTION INTRAMUSCULAR; INTRAVENOUS; SUBCUTANEOUS EVERY 4 HOURS PRN
Status: DISCONTINUED | OUTPATIENT
Start: 2017-05-29 | End: 2017-05-29

## 2017-05-29 RX ADMIN — OXYCODONE HYDROCHLORIDE 15 MG: 5 TABLET ORAL at 04:05

## 2017-05-29 RX ADMIN — CIPROFLOXACIN HYDROCHLORIDE 750 MG: 750 TABLET, FILM COATED ORAL at 08:05

## 2017-05-29 RX ADMIN — CYCLOBENZAPRINE HYDROCHLORIDE 10 MG: 10 TABLET, FILM COATED ORAL at 09:05

## 2017-05-29 RX ADMIN — ZIPRASIDONE HYDROCHLORIDE 40 MG: 40 CAPSULE ORAL at 08:05

## 2017-05-29 RX ADMIN — HYDROMORPHONE HYDROCHLORIDE 2 MG: 2 INJECTION INTRAMUSCULAR; INTRAVENOUS; SUBCUTANEOUS at 01:05

## 2017-05-29 RX ADMIN — BUSPIRONE HYDROCHLORIDE 15 MG: 5 TABLET ORAL at 01:05

## 2017-05-29 RX ADMIN — OXYCODONE HYDROCHLORIDE 15 MG: 5 TABLET ORAL at 11:05

## 2017-05-29 RX ADMIN — HYDROMORPHONE HYDROCHLORIDE 2 MG: 2 INJECTION INTRAMUSCULAR; INTRAVENOUS; SUBCUTANEOUS at 06:05

## 2017-05-29 RX ADMIN — ZIPRASIDONE HYDROCHLORIDE 40 MG: 40 CAPSULE ORAL at 04:05

## 2017-05-29 RX ADMIN — OXYCODONE HYDROCHLORIDE 15 MG: 5 TABLET ORAL at 09:05

## 2017-05-29 RX ADMIN — CITALOPRAM HYDROBROMIDE 20 MG: 20 TABLET ORAL at 08:05

## 2017-05-29 RX ADMIN — HYDROMORPHONE HYDROCHLORIDE 2.5 MG: 1 INJECTION, SOLUTION INTRAMUSCULAR; INTRAVENOUS; SUBCUTANEOUS at 08:05

## 2017-05-29 RX ADMIN — BUSPIRONE HYDROCHLORIDE 15 MG: 5 TABLET ORAL at 04:05

## 2017-05-29 RX ADMIN — LUBIPROSTONE 24 MCG: 24 CAPSULE, GELATIN COATED ORAL at 08:05

## 2017-05-29 RX ADMIN — HYDROMORPHONE HYDROCHLORIDE 2.5 MG: 1 INJECTION, SOLUTION INTRAMUSCULAR; INTRAVENOUS; SUBCUTANEOUS at 04:05

## 2017-05-29 RX ADMIN — MORPHINE SULFATE 30 MG: 30 TABLET, EXTENDED RELEASE ORAL at 08:05

## 2017-05-29 RX ADMIN — CLONAZEPAM 1 MG: 0.5 TABLET ORAL at 09:05

## 2017-05-29 RX ADMIN — CLONAZEPAM 1 MG: 0.5 TABLET ORAL at 08:05

## 2017-05-29 RX ADMIN — CIPROFLOXACIN HYDROCHLORIDE 750 MG: 750 TABLET, FILM COATED ORAL at 09:05

## 2017-05-29 RX ADMIN — FLUCONAZOLE 150 MG: 150 TABLET ORAL at 08:05

## 2017-05-29 RX ADMIN — MORPHINE SULFATE 30 MG: 30 TABLET, EXTENDED RELEASE ORAL at 09:05

## 2017-05-29 RX ADMIN — HYDROMORPHONE HYDROCHLORIDE 2.5 MG: 1 INJECTION, SOLUTION INTRAMUSCULAR; INTRAVENOUS; SUBCUTANEOUS at 01:05

## 2017-05-29 RX ADMIN — HYDROMORPHONE HYDROCHLORIDE 2 MG: 2 INJECTION INTRAMUSCULAR; INTRAVENOUS; SUBCUTANEOUS at 09:05

## 2017-05-29 RX ADMIN — BUSPIRONE HYDROCHLORIDE 15 MG: 5 TABLET ORAL at 09:05

## 2017-05-29 NOTE — PROGRESS NOTES
"Progress Note  Hospital Medicine      Admit Date: 5/22/2017   [facility]    SUBJECTIVE:     Follow-up For:  Osteonecrosis     Patient is a 46 y.o. female with significant past medical history of HTN and sickle cell dz was transferred from [facility]. Pt had originally presented to the hospital c/o sickle cell crisis; specifically bilateral arm pain (L > R) for two weeks. Pt thought pain may have been related to a fall she sustained approximately 1 month ago.  She also endorses h/o fever, chills and body aches. While admitted the pt began having fevers and Bcx grew out GNR (initally tx with cefepime, now on meropenem). BUE xrays are showing periosteal dz concerning for possible osteomyelitis vs metastatic dz. Prior to admission pt had port in place 2/2 poor venous access.      Latest labs at OSH revealed WBC 7.1, anemia (H/H 6.3/19), retic count 4.7, mild hypokalemia (K 3.1), mild hypomagnesemia (Mag 1.5), ESR & CRP elevated (118 and 103 respectively), lactic 1.1; INR 1.4, UA neg for infection, UPT neg. Bcx preliminary results showing GNR in both aerobic and anaerobic bottles. Xray LUE  showed "osseous erosive lesions within the mid humeral diaphysis bone marrow with additional destructive lesions noted in the proximal radial diaphysis. Shoulder and elbow joints intact. Findings concerning for metastatic disease to humerus and proximal radius." Xray RUE revealed "destructive mauro lesion within mid humeral diaphyseal bone marrow. Destructive lesions additional seening involiving radius & ulna. Should and elbow joints intact." Pt also had MRI of left shoulder performed revealing changes consistent with "early osteonecrosis along superior and medial aspect of left humeral head."  CXRY was neg for acute cardiopulmonary process. Pt was originally supposed to be transfused 3 units PRBCs, however pt has h/o antibodies with difficulty matching blood and had a questionable reaction to one of the units. " It is unclear from chart how many units were received. Per OSH records, the pt's port was d/c'd 2/2 bacteremia and PICC was placed, however it appears that this did not occur and pt's port is still in place. L shoulder arthrocentesis was performed, gram stain without organisms or WBCs.         Hospital Course:  Patient admitted to hospital medicine with infectious disease, orthopedics, and hematology/oncology consults. Contacted OSH for further culture results, patient is growing serratia, sensitive to cipro, ceftriaxone, cefepime, bactrim, amikacin. Resistant to cefazolin. Intermediate to cefoxitin, tobra.  5/23: Pt had xray, then MRI of right forearm with concern for possible multi-focal osteomyelitis, possible septic joint. Repeat blood cultures drawn here growing gram negative rods. Fever and tachycardia overnight around the time of blood transfusion.  5/24: Since repeat blood cultures still +, general surgery was consulted and removed the patient's port. Sensitivities here result as pan-sensitive serratia  5/25: IR performed bone biopsy of osteomyelitis as well as joint aspiration of right elbow. In addition to those cultures, repeat blood cultures were drawn to assess for clearance.  5/26: Port catheter tip culture growing gram negative marjorie. So are 2 of the IR bone biopsies. Repeat blood cultures so far negative x1 day.     Interval History: NAEON. This morning, pt reports increased pain at site of IR biopsies to right arm, requesting increased pain medication. Pt also reports back pain after returning from IR, reports she was laying on her back for a while there. Otherwise, this morning denies fevers/chills, no CP/SOB, still diarrhea ~2x/day. No dysuria. No nausea/vomit/dizziness/lightheadedness/numbness/tingling. No other new complaints.    OBJECTIVE:     Body mass index is 28.99 kg/m².    Vital Signs Range (Last 24H):  Temp:  [97.4 °F (36.3 °C)-98.3 °F (36.8 °C)]   Pulse:  [84-98]   Resp:  [16-18]   BP:  (110-129)/(63-81)   SpO2:  [92 %-98 %]     I & O (Last 24H):    Intake/Output Summary (Last 24 hours) at 05/29/17 1526  Last data filed at 05/28/17 1800   Gross per 24 hour   Intake              120 ml   Output                0 ml   Net              120 ml        Physical Exam:  Constitutional: She is oriented to person, place, and time. She appears well-developed and well-nourished. No distress.   HENT:   Head: Normocephalic and atraumatic.   Eyes: EOM are normal. Pupils are equal, round, and reactive to light.   Neck: Normal range of motion. Neck supple.   Cardiovascular: Normal rate, regular rhythm, normal heart sounds and intact distal pulses.    Pulmonary/Chest: Effort normal and breath sounds normal. No respiratory distress. She has no wheezes. She has no rales. She exhibits no tenderness.   S/p port removal from left subclavian   Abdominal: Soft. Bowel sounds are normal. She exhibits no distension. There is no tenderness.   Musculoskeletal: Normal range of motion. She exhibits tenderness. She exhibits no edema or deformity.   Spine without tenderness to palpation   Neurological: She is alert and oriented to person, place, and time.   Skin: Skin is warm and dry. She is not diaphoretic.   Psychiatric: Her behavior is normal.   Nursing note and vitals reviewed.      Recent Labs  Lab 05/27/17 0457 05/28/17  0501 05/29/17 0413    141 142   K 3.8 3.7 3.5    107 107   CO2 28 29 29   BUN 4* 5* 5*   CREATININE 0.6 0.6 0.6   GLU 83 81 81   CALCIUM 8.0* 7.9* 7.8*   MG 1.9 1.7 1.6   PHOS 3.4 3.8 3.3       Recent Labs  Lab 05/23/17  0012 05/23/17  0234 05/24/17  0355   ALKPHOS 154*  --   --    ALT 23  --   --    AST 39  --   --    ALBUMIN 2.0*  --   --    PROT 7.4  --   --    BILITOT 1.3*  --  1.8*   INR  --  1.3*  --          Recent Labs  Lab 05/27/17 0457 05/28/17  0501 05/29/17  0413   WBC 5.18 4.78 3.74*   HGB 6.2* 6.5* 6.2*   HCT 19.7* 20.3* 19.1*    205 212   GRAN 71.0 79.0* 66.0   LYMPH 19.0   Test Not Performed 12.0*  CANCELED 27.0  Test Not Performed   MONO 7.0  Test Not Performed 3.0*  CANCELED 3.0*  Test Not Performed         busPIRone 15 mg Oral TID   ciprofloxacin HCl 750 mg Oral Q12H   citalopram 20 mg Oral Daily   clonazePAM 1 mg Oral BID   fluconazole 150 mg Oral Daily   lubiprostone 24 mcg Oral BID WM   morphine 30 mg Oral Q12H   senna-docusate 8.6-50 mg 1 tablet Oral BID   ziprasidone 40 mg Oral BID WM        As Needed sodium chloride, acetaminophen, cyclobenzaprine, HYDROmorphone, loperamide, ondansetron, ondansetron, oxycodone, oxycodone    ASSESSMENT/PLAN:     Assessment: Yamila Lan is a 46 y.o. female here with:     Active Hospital Problems    Diagnosis  POA    *Osteonecrosis [M87.9]  Yes    Diarrhea [R19.7]  Yes    Tachycardia [R00.0]  Unknown    Sepsis [A41.9]  Yes    Acute hematogenous osteomyelitis [M86.00]  Unknown    Pyogenic arthritis of right wrist [M00.9]  Unknown    Gram-negative bacteremia [R78.81]  Yes    Sickle cell crisis [D57.00]  Yes      Resolved Hospital Problems    Diagnosis Date Resolved POA    Hypokalemia [E87.6] 05/25/2017 Yes    Hypomagnesemia [E83.42] 05/25/2017 Yes    Hypophosphatemia [E83.39] 05/25/2017 Yes        Plan:   Gram-negative bacteremia     - Bcx     - Serratia, transition to oral cipro 750 bid per ID recs.   -- potential sources include port and osteomyelitis.    - s/p port removal 5/24 with gen surg, appreciate their assistance   - patient's MRI right arm with multiple lesions concerning for osteomyelitis, along with possible septic joint     - s/p IR bone biopsy and joint aspiration 5/25  - Many cultures still pending    - So far: Catheter tip with gram negative rods    - bone biopsy x2 with gram negative rods    - repeat blood cx negative so far  -- ID consult reviewed. If remains afebrile overnight and cultures remain clear will transition to oral cipro          Acute hematogenous osteomyelitis     Xray bilateral upper extremity with  multiple abnormalities, R>L, f/u MRI right arm with multifocal lesions concerning for osteomyelitis   - Initial theory is that patient potentially had osteonecrosis from her sickle cell to begin with, then became bacteremic from her port, which then seeded the previously damaged areas of bone leading to osteomyelitis   - appreciate ortho consult             Sickle cell crisis     - H/H 6.3/18 at OSH, no improvement with transfusion 1 unit PRBCs   - with hx sickle cell disease, pt reports her hemoglobin is usually around 7    - 7.4 -> 6.5 ->6.5 today    - will continue to monitor, but likely not attempt transfusion again unless hemoglobin reaches ~6 (or worsening SOB, or CP)     - will order type and screen with tomorrow's morning labs in case xfusion needed. Consider pre-medication with tylenol/PO benadryl if transfusing   - pt does not appear to have acute chest at this time   - pain control (pt requests q2 hours IV dilaudid, however in the afternoons patient tends to become somnolent. For today though will increase dilaudid to 1.5 mg q3 hours   - hem/onc also consulted, appreciate their recs          Diarrhea     c dif negative   - electrolyte abnormalities   - improving slightly, but still a few times a day   - will treat symptomatically with loperamide    - was ordered, but not given. Will attempt to use the PRN medication today for her symptoms and evaluate for response.          Sepsis     Per gram negative bacteremia section          Tachycardia     Most likely 2/2 sepsis, bacteremia   - Will continue to treat the sepsis          * Osteonecrosis     - BUE pain with evidence of osteonecrosis on xrays concerning for osteomyelitis vs malignancy, vs osteonecrosis 2/2 sickle cell   - MRI concerning for osteomyelitis   - management per above                Amanda Heredia MD

## 2017-05-30 LAB
ANION GAP SERPL CALC-SCNC: 7 MMOL/L
ANISOCYTOSIS BLD QL SMEAR: SLIGHT
BACTERIA BLD CULT: NORMAL
BACTERIA BLD CULT: NORMAL
BASOPHILS # BLD AUTO: ABNORMAL K/UL
BASOPHILS NFR BLD: 0 %
BUN SERPL-MCNC: 4 MG/DL
CALCIUM SERPL-MCNC: 8 MG/DL
CHLORIDE SERPL-SCNC: 105 MMOL/L
CO2 SERPL-SCNC: 29 MMOL/L
CREAT SERPL-MCNC: 0.7 MG/DL
DIFFERENTIAL METHOD: ABNORMAL
EOSINOPHIL # BLD AUTO: ABNORMAL K/UL
EOSINOPHIL NFR BLD: 1 %
ERYTHROCYTE [DISTWIDTH] IN BLOOD BY AUTOMATED COUNT: 25.9 %
EST. GFR  (AFRICAN AMERICAN): >60 ML/MIN/1.73 M^2
EST. GFR  (NON AFRICAN AMERICAN): >60 ML/MIN/1.73 M^2
GLUCOSE SERPL-MCNC: 101 MG/DL
HCT VFR BLD AUTO: 20.2 %
HGB BLD-MCNC: 6.7 G/DL
HYPOCHROMIA BLD QL SMEAR: ABNORMAL
LDH SERPL L TO P-CCNC: 479 U/L
LYMPHOCYTES # BLD AUTO: ABNORMAL K/UL
LYMPHOCYTES NFR BLD: 23 %
MAGNESIUM SERPL-MCNC: 1.4 MG/DL
MCH RBC QN AUTO: 25.1 PG
MCHC RBC AUTO-ENTMCNC: 33.2 %
MCV RBC AUTO: 76 FL
MONOCYTES # BLD AUTO: ABNORMAL K/UL
MONOCYTES NFR BLD: 7 %
MYELOCYTES NFR BLD MANUAL: 1 %
NEUTROPHILS NFR BLD: 68 %
OVALOCYTES BLD QL SMEAR: ABNORMAL
PHOSPHATE SERPL-MCNC: 3 MG/DL
PLATELET # BLD AUTO: 245 K/UL
PMV BLD AUTO: 8.6 FL
POIKILOCYTOSIS BLD QL SMEAR: SLIGHT
POLYCHROMASIA BLD QL SMEAR: ABNORMAL
POTASSIUM SERPL-SCNC: 3.3 MMOL/L
RBC # BLD AUTO: 2.67 M/UL
SODIUM SERPL-SCNC: 141 MMOL/L
TARGETS BLD QL SMEAR: ABNORMAL
WBC # BLD AUTO: 3.75 K/UL

## 2017-05-30 PROCEDURE — 36415 COLL VENOUS BLD VENIPUNCTURE: CPT

## 2017-05-30 PROCEDURE — 83735 ASSAY OF MAGNESIUM: CPT

## 2017-05-30 PROCEDURE — 83615 LACTATE (LD) (LDH) ENZYME: CPT

## 2017-05-30 PROCEDURE — 11000001 HC ACUTE MED/SURG PRIVATE ROOM

## 2017-05-30 PROCEDURE — 63600175 PHARM REV CODE 636 W HCPCS: Performed by: HOSPITALIST

## 2017-05-30 PROCEDURE — 84100 ASSAY OF PHOSPHORUS: CPT

## 2017-05-30 PROCEDURE — 25000003 PHARM REV CODE 250: Performed by: NURSE PRACTITIONER

## 2017-05-30 PROCEDURE — 25000003 PHARM REV CODE 250: Performed by: INTERNAL MEDICINE

## 2017-05-30 PROCEDURE — 85027 COMPLETE CBC AUTOMATED: CPT

## 2017-05-30 PROCEDURE — 80048 BASIC METABOLIC PNL TOTAL CA: CPT

## 2017-05-30 PROCEDURE — 85007 BL SMEAR W/DIFF WBC COUNT: CPT

## 2017-05-30 PROCEDURE — 25000003 PHARM REV CODE 250: Performed by: HOSPITALIST

## 2017-05-30 RX ADMIN — HYDROMORPHONE HYDROCHLORIDE 2 MG: 2 INJECTION INTRAMUSCULAR; INTRAVENOUS; SUBCUTANEOUS at 03:05

## 2017-05-30 RX ADMIN — LUBIPROSTONE 24 MCG: 24 CAPSULE, GELATIN COATED ORAL at 04:05

## 2017-05-30 RX ADMIN — LUBIPROSTONE 24 MCG: 24 CAPSULE, GELATIN COATED ORAL at 08:05

## 2017-05-30 RX ADMIN — HYDROMORPHONE HYDROCHLORIDE 2 MG: 2 INJECTION INTRAMUSCULAR; INTRAVENOUS; SUBCUTANEOUS at 06:05

## 2017-05-30 RX ADMIN — MORPHINE SULFATE 30 MG: 30 TABLET, EXTENDED RELEASE ORAL at 08:05

## 2017-05-30 RX ADMIN — HYDROMORPHONE HYDROCHLORIDE 2 MG: 2 INJECTION INTRAMUSCULAR; INTRAVENOUS; SUBCUTANEOUS at 02:05

## 2017-05-30 RX ADMIN — ZIPRASIDONE HYDROCHLORIDE 40 MG: 40 CAPSULE ORAL at 08:05

## 2017-05-30 RX ADMIN — CIPROFLOXACIN HYDROCHLORIDE 750 MG: 750 TABLET, FILM COATED ORAL at 08:05

## 2017-05-30 RX ADMIN — HYDROMORPHONE HYDROCHLORIDE 2 MG: 2 INJECTION INTRAMUSCULAR; INTRAVENOUS; SUBCUTANEOUS at 11:05

## 2017-05-30 RX ADMIN — BUSPIRONE HYDROCHLORIDE 15 MG: 5 TABLET ORAL at 09:05

## 2017-05-30 RX ADMIN — OXYCODONE HYDROCHLORIDE 15 MG: 5 TABLET ORAL at 04:05

## 2017-05-30 RX ADMIN — ZIPRASIDONE HYDROCHLORIDE 40 MG: 40 CAPSULE ORAL at 04:05

## 2017-05-30 RX ADMIN — FLUCONAZOLE 150 MG: 150 TABLET ORAL at 08:05

## 2017-05-30 RX ADMIN — CLONAZEPAM 1 MG: 0.5 TABLET ORAL at 08:05

## 2017-05-30 RX ADMIN — BUSPIRONE HYDROCHLORIDE 15 MG: 5 TABLET ORAL at 06:05

## 2017-05-30 RX ADMIN — OXYCODONE HYDROCHLORIDE 15 MG: 5 TABLET ORAL at 09:05

## 2017-05-30 RX ADMIN — HYDROMORPHONE HYDROCHLORIDE 2 MG: 2 INJECTION INTRAMUSCULAR; INTRAVENOUS; SUBCUTANEOUS at 12:05

## 2017-05-30 RX ADMIN — BUSPIRONE HYDROCHLORIDE 15 MG: 5 TABLET ORAL at 02:05

## 2017-05-30 RX ADMIN — CITALOPRAM HYDROBROMIDE 20 MG: 20 TABLET ORAL at 08:05

## 2017-05-30 RX ADMIN — OXYCODONE HYDROCHLORIDE 20 MG: 5 TABLET ORAL at 04:05

## 2017-05-31 VITALS
RESPIRATION RATE: 16 BRPM | SYSTOLIC BLOOD PRESSURE: 90 MMHG | TEMPERATURE: 98 F | HEART RATE: 104 BPM | WEIGHT: 168.88 LBS | HEIGHT: 64 IN | BODY MASS INDEX: 28.83 KG/M2 | DIASTOLIC BLOOD PRESSURE: 52 MMHG | OXYGEN SATURATION: 97 %

## 2017-05-31 LAB
ANION GAP SERPL CALC-SCNC: 5 MMOL/L
ANISOCYTOSIS BLD QL SMEAR: SLIGHT
BASOPHILS # BLD AUTO: ABNORMAL K/UL
BASOPHILS NFR BLD: 0 %
BUN SERPL-MCNC: 4 MG/DL
CALCIUM SERPL-MCNC: 8 MG/DL
CHLORIDE SERPL-SCNC: 106 MMOL/L
CO2 SERPL-SCNC: 31 MMOL/L
CREAT SERPL-MCNC: 0.7 MG/DL
DIFFERENTIAL METHOD: ABNORMAL
EOSINOPHIL # BLD AUTO: ABNORMAL K/UL
EOSINOPHIL NFR BLD: 1 %
ERYTHROCYTE [DISTWIDTH] IN BLOOD BY AUTOMATED COUNT: 25.6 %
EST. GFR  (AFRICAN AMERICAN): >60 ML/MIN/1.73 M^2
EST. GFR  (NON AFRICAN AMERICAN): >60 ML/MIN/1.73 M^2
GLUCOSE SERPL-MCNC: 88 MG/DL
HCT VFR BLD AUTO: 19.1 %
HGB BLD-MCNC: 6.2 G/DL
LDH SERPL L TO P-CCNC: 430 U/L
LYMPHOCYTES # BLD AUTO: ABNORMAL K/UL
LYMPHOCYTES NFR BLD: 34 %
MAGNESIUM SERPL-MCNC: 1.6 MG/DL
MCH RBC QN AUTO: 24.7 PG
MCHC RBC AUTO-ENTMCNC: 32.5 %
MCV RBC AUTO: 76 FL
MONOCYTES # BLD AUTO: ABNORMAL K/UL
MONOCYTES NFR BLD: 6 %
MYELOCYTES NFR BLD MANUAL: 1 %
NEUTROPHILS NFR BLD: 58 %
PHOSPHATE SERPL-MCNC: 3.8 MG/DL
PLATELET # BLD AUTO: 231 K/UL
PLATELET BLD QL SMEAR: ABNORMAL
PMV BLD AUTO: 8.9 FL
POIKILOCYTOSIS BLD QL SMEAR: ABNORMAL
POLYCHROMASIA BLD QL SMEAR: ABNORMAL
POTASSIUM SERPL-SCNC: 3.3 MMOL/L
RBC # BLD AUTO: 2.51 M/UL
SODIUM SERPL-SCNC: 142 MMOL/L
WBC # BLD AUTO: 3.78 K/UL

## 2017-05-31 PROCEDURE — 85027 COMPLETE CBC AUTOMATED: CPT

## 2017-05-31 PROCEDURE — 83615 LACTATE (LD) (LDH) ENZYME: CPT

## 2017-05-31 PROCEDURE — 63600175 PHARM REV CODE 636 W HCPCS: Performed by: HOSPITALIST

## 2017-05-31 PROCEDURE — 25000003 PHARM REV CODE 250: Performed by: NURSE PRACTITIONER

## 2017-05-31 PROCEDURE — 80048 BASIC METABOLIC PNL TOTAL CA: CPT

## 2017-05-31 PROCEDURE — 25000003 PHARM REV CODE 250: Performed by: HOSPITALIST

## 2017-05-31 PROCEDURE — 36415 COLL VENOUS BLD VENIPUNCTURE: CPT

## 2017-05-31 PROCEDURE — 85007 BL SMEAR W/DIFF WBC COUNT: CPT

## 2017-05-31 PROCEDURE — 83735 ASSAY OF MAGNESIUM: CPT

## 2017-05-31 PROCEDURE — 99239 HOSP IP/OBS DSCHRG MGMT >30: CPT | Mod: ,,, | Performed by: HOSPITALIST

## 2017-05-31 PROCEDURE — 84100 ASSAY OF PHOSPHORUS: CPT

## 2017-05-31 RX ORDER — CIPROFLOXACIN 750 MG/1
750 TABLET, FILM COATED ORAL EVERY 12 HOURS
Qty: 74 TABLET | Refills: 0 | Status: SHIPPED | OUTPATIENT
Start: 2017-05-31 | End: 2017-06-30 | Stop reason: SDUPTHER

## 2017-05-31 RX ORDER — FLUCONAZOLE 100 MG/1
100 TABLET ORAL DAILY
Qty: 37 TABLET | Refills: 0 | Status: SHIPPED | OUTPATIENT
Start: 2017-05-31 | End: 2017-07-07

## 2017-05-31 RX ADMIN — OXYCODONE HYDROCHLORIDE 15 MG: 5 TABLET ORAL at 05:05

## 2017-05-31 RX ADMIN — BUSPIRONE HYDROCHLORIDE 15 MG: 5 TABLET ORAL at 05:05

## 2017-05-31 RX ADMIN — CIPROFLOXACIN HYDROCHLORIDE 750 MG: 750 TABLET, FILM COATED ORAL at 08:05

## 2017-05-31 RX ADMIN — STANDARDIZED SENNA CONCENTRATE AND DOCUSATE SODIUM 1 TABLET: 8.6; 5 TABLET, FILM COATED ORAL at 08:05

## 2017-05-31 RX ADMIN — OXYCODONE HYDROCHLORIDE 15 MG: 5 TABLET ORAL at 03:05

## 2017-05-31 RX ADMIN — ZIPRASIDONE HYDROCHLORIDE 40 MG: 40 CAPSULE ORAL at 08:05

## 2017-05-31 RX ADMIN — CLONAZEPAM 1 MG: 0.5 TABLET ORAL at 08:05

## 2017-05-31 RX ADMIN — CITALOPRAM HYDROBROMIDE 20 MG: 20 TABLET ORAL at 08:05

## 2017-05-31 RX ADMIN — HYDROMORPHONE HYDROCHLORIDE 2 MG: 2 INJECTION INTRAMUSCULAR; INTRAVENOUS; SUBCUTANEOUS at 08:05

## 2017-05-31 RX ADMIN — LUBIPROSTONE 24 MCG: 24 CAPSULE, GELATIN COATED ORAL at 08:05

## 2017-05-31 RX ADMIN — BUSPIRONE HYDROCHLORIDE 15 MG: 5 TABLET ORAL at 01:05

## 2017-05-31 NOTE — PLAN OF CARE
Dc order written.  Medicare rights explained yesterday w verbalization.  Future Appointments  Date Time Provider Department [place]   6/12/2017 11:30 AM Uday Mcknight MD [facility] ID [address]        05/31/17 [id number]   Final Note   Assessment Type Final Discharge Note   Discharge Disposition Home   Discharge planning education complete? Yes   Hospital Follow Up  Appt(s) scheduled? Yes   Discharge plans and expectations educations in teach back method with documentation complete? Yes   Offered [facility] -- Bedside Delivery? n/a   Discharge/Hospital Encounter Summary to (non[facility]) PCP n/a   Referral to Outpatient Case Management complete? n/a   Referral to / orders for Home Health Complete? n/a   30 day supply of medicines given at discharge, if documented non-compliance / non-adherence? n/a   Any social issues identified prior to discharge? No   Did you assess the readiness or willingness of the family or caregiver to support self management of care? Yes   Right Care Referral Info   Post Acute Recommendation No Care

## 2017-05-31 NOTE — PLAN OF CARE
Problem: Patient Care Overview  Goal: Plan of Care Review  Outcome: Outcome(s) achieved Date Met: 05/31/17  Pt to be D/C to home via personal transport.    VSS and afebrile. Pt free from falls and safe. Maintained AOx4.    Pain controlled via PRN analgesics.    AVS to be provided c/ pertinent F/U info to be indicated.

## 2017-05-31 NOTE — PLAN OF CARE
Problem: Patient Care Overview  Goal: Plan of Care Review  Outcome: Ongoing (interventions implemented as appropriate)  Plan of care was reviewed with the pt and verbalized understanding. VSS, side rails up x3, oriented to room and to call bell, asked to call nurse for anything. Pt complained of pain throughout shift and was given prn meds as ordered. Checked on pt at least every 2 hours throughout shift. Pt remained free from falls/injuries or skin breakdown throughout shift. RN will continue to monitor pt.

## 2017-06-01 LAB — BACTERIA SPEC ANAEROBE CULT: NORMAL

## 2017-06-02 ENCOUNTER — PATIENT OUTREACH (OUTPATIENT)
Dept: ADMINISTRATIVE | Facility: CLINIC | Age: 47
End: 2017-06-02
Payer: MEDICARE

## 2017-06-02 NOTE — PATIENT INSTRUCTIONS
Sepsis  Sepsis happens when your body responds with widespread inflammation to a bad infection or bacteremia--the presence of bacteria in your bloodstream. Sepsis can be deadly. Blood pressure may drop and the lungs and kidneys may start to fail. Emergency care for sepsis is crucial.    Risk factors  Those most at risk for sepsis are:  · Infants or older adults  · People who have an illness that weakens their immune system, such as cancer, AIDS, or diabetes  · People being treated with chemotherapy medicines or radiation, which weakens the immune system  · People who have had a transplant  · People with a very severe infection such as pneumonia, meningitis, or a urinary tract infection  When to go to the emergency department (ED)  Sepsis is an emergency. Go to the nearest ED if you have a fever with any of these symptoms:  · Chills and shaking  · Rapid heartbeat and breathing  · Trouble breathing  · Severe nausea or uncontrolled vomiting  · Confusion, disorientation, drowsiness, or dizziness  · Decreased urination  · Severe pain, including in the back or joints   What to expect in the ED  · Blood and urine tests are done to look for bacteria. They also check for organ failure.  · Blood, urine, or sputum cultures may be taken. The samples are sent to a lab. They are placed in a special container. Any bacteria should grow in 24 hours.  · X-rays or other imaging tests may be done.  A person with sepsis will be admitted to the hospital and treated with antibiotics. Treatment may also include oxygen and intravenous fluids.  Date Last Reviewed: 10/1/2016  © 4068-9475 [employer]. [address] [place] [address]. All rights reserved. This information is not intended as a substitute for professional medical care. Always follow your healthcare professional's instructions.        Sepsis  Sepsis happens when your body responds with widespread inflammation to a bad infection or bacteremia--the presence  of bacteria in your bloodstream. Sepsis can be deadly. Blood pressure may drop and the lungs and kidneys may start to fail. Emergency care for sepsis is crucial.    Risk factors  Those most at risk for sepsis are:  · Infants or older adults  · People who have an illness that weakens their immune system, such as cancer, AIDS, or diabetes  · People being treated with chemotherapy medicines or radiation, which weakens the immune system  · People who have had a transplant  · People with a very severe infection such as pneumonia, meningitis, or a urinary tract infection  When to go to the emergency department (ED)  Sepsis is an emergency. Go to the nearest ED if you have a fever with any of these symptoms:  · Chills and shaking  · Rapid heartbeat and breathing  · Trouble breathing  · Severe nausea or uncontrolled vomiting  · Confusion, disorientation, drowsiness, or dizziness  · Decreased urination  · Severe pain, including in the back or joints   What to expect in the ED  · Blood and urine tests are done to look for bacteria. They also check for organ failure.  · Blood, urine, or sputum cultures may be taken. The samples are sent to a lab. They are placed in a special container. Any bacteria should grow in 24 hours.  · X-rays or other imaging tests may be done.  A person with sepsis will be admitted to the hospital and treated with antibiotics. Treatment may also include oxygen and intravenous fluids.  Date Last Reviewed: 10/1/2016  © 7486-7486 [employer]. [address] [place] [address]. All rights reserved. This information is not intended as a substitute for professional medical care. Always follow your healthcare professional's instructions.

## 2017-06-16 ENCOUNTER — TELEPHONE (OUTPATIENT)
Dept: INFECTIOUS DISEASES | Facility: CLINIC | Age: 47
End: 2017-06-16

## 2017-06-16 NOTE — DISCHARGE SUMMARY
"[facility] Hospital Medicine  Discharge Summary      Patient Name: Yamila Lan  MRN: [id number]  Admission Date: 5/22/2017  Hospital Length of Stay: 9 days  Discharge Date and Time: 5/31/2017  3:31 PM  Attending Physician: Tarsha Gar MD  Discharging Provider: Tarsha Gar MD  Primary Care Provider: Silvana Dhaliwal MD    [facility] Medicine Team: [facility] MED L Tarsha Gar MD    HPI: Ms aLn is a 46 y.o. Lady with HTN and sickle cell dz who was transferred from [facility]. She had originally presented to the hospital c/o sickle cell crisis; specifically bilateral arm pain (L > R) for two weeks. She thought pain may have been related to a fall she sustained approximately 1 month ago.  She also endorsed h/o fever, chills and body aches. While admitted the pt began having fevers and Bcx grew out GNR (initally tx with cefepime, now on meropenem). BUE xrays are showing periosteal dz concerning for possible osteomyelitis vs metastatic dz. Prior to admission, she had port in place 2/2 poor venous access.      Latest labs at OSH revealed WBC 7.1, anemia (H/H 6.3/19), retic count 4.7, mild hypokalemia (K 3.1), mild hypomagnesemia (Mag 1.5), ESR & CRP elevated (118 and 103 respectively), lactic 1.1; INR 1.4, UA neg for infection, UPT neg. Bcx preliminary results showing GNR in both aerobic and anaerobic bottles. Xray LUE  showed "osseous erosive lesions within the mid humeral diaphysis bone marrow with additional destructive lesions noted in the proximal radial diaphysis. Shoulder and elbow joints intact. Findings concerning for metastatic disease to humerus and proximal radius." Xray RUE revealed "destructive mauro lesion within mid humeral diaphyseal bone marrow. Destructive lesions additional seening involiving radius & ulna. Should and elbow joints intact." Pt also had MRI of left shoulder performed revealing changes consistent with "early osteonecrosis along superior and medial " "aspect of left humeral head."  CXR was neg for acute cardiopulmonary process. She was originally supposed to be transfused 3 units PRBCs, however pt has h/o antibodies with difficulty matching blood and had a questionable reaction to one of the units. It is unclear from chart how many units were received. Per OSH records, the pt's port was d/c'd 2/2 bacteremia and PICC was placed, however it appears that this did not occur and pt's PORT is still in place. L shoulder arthrocentesis was performed, gram stain without organisms or WBCs.     A/P:    Sepsis due to serratia bacteremia and diffuse hematogenous osteomyelitis of multiple sites.  Etiology likely pre-existing osteonecrosis from her sickle cell disease, then bacteremic from her PORT, which then seeded the previously damaged areas of bone leading to osteomyelitis. Blood cx and bone sample with serratia.  S/p port removal 5/24 by general surgery.  MRI right arm with multiple lesions concerning for osteomyelitis, along with possible septic joints.  s/p IR bone biopsy and joint aspiration 5/25.  Xray bilateral upper extremity with multiple abnormalities, R>L.  MRI right arm with multifocal lesions concerning for osteomyelitis.   Ortho consulted.  - Many cultures as follows: Catheter tip with gram negative rods.  bone biopsy x2 with gram negative rods  -  Since she had clear blood Cx and was afebrile x 48 hrs, okay to transition to cipro 750 mg po BID to complete her therapy per ID consult.  Cipro PO has good bone penetration.    - needs 6 weeks of cipro from 5/25  - since she has diffuse osteo, may need to extend course even longer per ID.  Defer to ID clinic.  - ID follow up in 3-4 weeks  - needs weekly cbc, crp, sed rate, and chem 7 while on therapy. Please fax results to ID clinic   - she asked me for dilflucan daily to take while on ABx, as she has had candida UTI in past while on cipro    Sickle cell vaso-occlusive pain crisis - resolved  - H/H 6.3/18 at OSH, " no improvement with transfusion 1 unit PRBCs   - with hx sickle cell disease, pt reports her hemoglobin is usually around 7    - 7.4 -> 6.5 ->6.5     - monitored, planned to not attempt transfusion again unless hemoglobin reaches ~6 (or worsening SOB, or CP)     - ordered type and screen in case transfusion needed. Consider pre-medication with tylenol/PO benadryl if transfusing   - no signs of acute chest syndrome    - pain control (pt requests q2 hours IV dilaudid, however in the afternoons patient tends to become somnolent. Increased dilaudid to 1.5 mg q3 hours   - hem/onc consulted     Diarrhea - c diff negative   - electrolyte abnormalities Tx-ed   - treated symptomatically with loperamide PRN      * No surgery found *     Physical Exam on day of d/c:  Vital Signs Range (Last 24H):  Temp:  [98.1 °F (36.7 °C)-98.8 °F (37.1 °C)]   Pulse:  []   Resp:  [16-19]   BP: ()/(52-83)   SpO2:  [95 %-98 %]      I & O (Last 24H):No intake or output data in the 24 hours ending 05/31/17 1241     Physical Exam:  General appearance: no distress  Mental status: Alert and oriented x 3  HEENT:  conjunctivae/corneas clear, PERRL  Neck: supple, thyroid not enlarged  Pulm:   normal respiratory effort, CTA B, no c/w/r  Card: RRR, S1, S2 normal, no murmur, click, rub or gallop  Abd: soft, NT, ND, BS present; no masses, no organomegaly  Ext: no c/c/e  Pulses: 2+, symmetric  Skin: color, texture, turgor normal. No rashes or lesions  Neuro: CN II-XII grossly intact, no focal numbness or weakness, normal strength and ton         Indwelling Lines/Drains at time of discharge:   Lines/Drains/Airways          No matching active lines, drains, or airways          Consults:   Consults         Status Ordering Provider     Inpatient consult to General Surgery  Once     Provider:  (Not yet assigned)    Completed TOR STUART     Inpatient consult to Hematology/Oncology  Once     Provider:  (Not yet assigned)    Completed MARTY OLSON  [place]     Inpatient consult to Infectious Diseases  Once     Provider:  (Not yet assigned)    Completed MARTY OLSON     Inpatient consult to Interventional Radiology  Once     Provider:  (Not yet assigned)    Completed TOR STUART     Inpatient consult to Midline team  Once     Provider:  (Not yet assigned)    Completed TOR STUART     Inpatient consult to Orthopedics  Once     Provider:  (Not yet assigned)    Completed MARTY OLSON          Significant Diagnostic Studies: see above    Pending Diagnostic Studies:     None        Final Active Diagnoses:    Diagnosis Date Noted POA    PRINCIPAL PROBLEM:  Osteonecrosis [M87.9] 05/23/2017 Yes    Diarrhea [R19.7] 05/24/2017 Yes    Tachycardia [R00.0] 05/24/2017 Yes    Sepsis [A41.9] 05/24/2017 Yes    Acute hematogenous osteomyelitis [M86.00]  Yes    Pyogenic arthritis of right wrist [M00.9]  Yes    Gram-negative bacteremia [R78.81] 05/23/2017 Yes    Sickle cell crisis [D57.00] 05/22/2017 Yes      Problems Resolved During this Admission:    Diagnosis Date Noted Date Resolved POA    Hypokalemia [E87.6] 05/23/2017 05/25/2017 Yes    Hypomagnesemia [E83.42] 05/23/2017 05/25/2017 Yes    Hypophosphatemia [E83.39] 05/23/2017 05/25/2017 Yes      Discharged Condition: stable    Disposition: Home or Self Care    Follow Up: ID, PCP    Patient Instructions:     Diet general     Activity as tolerated     Call MD for:  temperature >100.4     Call MD for:  persistent nausea and vomiting or diarrhea     Call MD for:  worsening rash     No dressing needed   Order Comments: -OK to shower   -Do not submerge in water for 2 weeks  -No formal follow-up needed. Stitches will fall out on own.       Medications:  Reconciled Home Medications:   Discharge Medication List as of 5/31/2017  3:14 PM      START taking these medications    Details   ciprofloxacin HCl (CIPRO) 750 MG tablet Take 1 tablet (750 mg total) by mouth every 12 (twelve) hours., Starting Wed 5/31/2017,  Until Fri 7/7/2017, Normal      fluconazole (DIFLUCAN) 100 MG tablet Take 1 tablet (100 mg total) by mouth once daily., Starting Wed 5/31/2017, Until Fri 7/7/2017, Normal         CONTINUE these medications which have NOT CHANGED    Details   busPIRone (BUSPAR) 15 MG tablet Take 15 mg by mouth 3 (three) times daily. 3-4 x/day, Historical Med      citalopram (CELEXA) 20 MG tablet Take 20 mg by mouth once daily., Historical Med      clonazePAM (KLONOPIN) 1 MG tablet Take 1 mg by mouth 2 (two) times daily., Historical Med      lubiprostone (AMITIZA) 24 MCG Cap Take 24 mcg by mouth 2 (two) times daily with meals., Historical Med      morphine (MSIR) 30 MG tablet Take 30 mg by mouth 2 (two) times daily as needed for Pain., Historical Med      oxycodone (ROXICODONE) 5 MG immediate release tablet Take 5 mg by mouth every 4 (four) hours as needed for Pain., Historical Med      potassium chloride SA (K-DUR,KLOR-CON) 10 MEQ tablet Take 10 mEq by mouth 2 (two) times daily., Historical Med      ziprasidone (GEODON) 40 MG Cap Take 40 mg by mouth 2 (two) times daily with meals., Historical Med      carvedilol (COREG) 6.25 MG tablet Take 6.25 mg by mouth 2 (two) times daily with meals., Until Discontinued, Historical Med      cyclobenzaprine (FLEXERIL) 5 MG tablet Take 10 mg by mouth once daily. , Historical Med      zolpidem (AMBIEN) 5 MG Tab Take 5 mg by mouth nightly as needed., Until Discontinued, Historical Med           Time spent on the discharge of patient: 60 minutes    Tarsha Gar MD  Department of Hospital Medicine  [facility]

## 2017-06-16 NOTE — TELEPHONE ENCOUNTER
----- Message from Rafaela Ferro sent at 6/14/2017  2:19 PM CDT -----  Contact: Patient  Patient is requesting a call back in regards to records being sent over to her primary care doctor.    Please call [phone].    Please fax information to  Dr. Mirian Dominguez  [phone]

## 2017-06-30 ENCOUNTER — OFFICE VISIT (OUTPATIENT)
Dept: INFECTIOUS DISEASES | Facility: CLINIC | Age: 47
End: 2017-06-30
Payer: MEDICARE

## 2017-06-30 VITALS
HEIGHT: 61 IN | SYSTOLIC BLOOD PRESSURE: 121 MMHG | DIASTOLIC BLOOD PRESSURE: 82 MMHG | HEART RATE: 87 BPM | TEMPERATURE: 98 F | WEIGHT: 176.56 LBS | BODY MASS INDEX: 33.34 KG/M2

## 2017-06-30 DIAGNOSIS — M86.09 ACUTE HEMATOGENOUS OSTEOMYELITIS OF MULTIPLE SITES: Primary | ICD-10-CM

## 2017-06-30 PROCEDURE — 99214 OFFICE O/P EST MOD 30 MIN: CPT | Mod: S$PBB,,, | Performed by: INTERNAL MEDICINE

## 2017-06-30 PROCEDURE — 99999 PR PBB SHADOW E&M-EST. PATIENT-LVL III: CPT | Mod: PBBFAC,,, | Performed by: INTERNAL MEDICINE

## 2017-06-30 PROCEDURE — 99213 OFFICE O/P EST LOW 20 MIN: CPT | Mod: PBBFAC | Performed by: INTERNAL MEDICINE

## 2017-06-30 RX ORDER — MELOXICAM 15 MG/1
TABLET ORAL
COMMUNITY
Start: 2017-06-27

## 2017-06-30 RX ORDER — BENZTROPINE MESYLATE 0.5 MG/1
TABLET ORAL
COMMUNITY
Start: 2017-06-19

## 2017-06-30 RX ORDER — AMLODIPINE AND BENAZEPRIL HYDROCHLORIDE 5; 20 MG/1; MG/1
CAPSULE ORAL
COMMUNITY
Start: 2017-06-16

## 2017-06-30 RX ORDER — CIPROFLOXACIN 750 MG/1
750 TABLET, FILM COATED ORAL EVERY 12 HOURS
Qty: 14 TABLET | Refills: 0 | Status: SHIPPED | OUTPATIENT
Start: 2017-06-30 | End: 2017-07-07

## 2017-06-30 RX ORDER — POTASSIUM CHLORIDE 750 MG/1
CAPSULE, EXTENDED RELEASE ORAL
COMMUNITY
Start: 2017-06-16 | End: 2017-06-30

## 2017-06-30 RX ORDER — MORPHINE SULFATE 30 MG/1
TABLET, FILM COATED, EXTENDED RELEASE ORAL
COMMUNITY
Start: 2017-05-25 | End: 2017-06-30

## 2017-06-30 RX ORDER — ZOLPIDEM TARTRATE 10 MG/1
TABLET ORAL
COMMUNITY
Start: 2017-06-22

## 2017-06-30 RX ORDER — CYCLOBENZAPRINE HCL 10 MG
TABLET ORAL
COMMUNITY
Start: 2017-06-27

## 2017-06-30 RX ORDER — OXYCODONE AND ACETAMINOPHEN 10; 325 MG/1; MG/1
TABLET ORAL
COMMUNITY
Start: 2017-05-25

## 2017-06-30 NOTE — PROGRESS NOTES
Infectious Diseases Clinic Note    Subjective:       Patient ID: Yamila Lan is a 46 y.o. female.    Chief Complaint: Hospital Follow Up    HPI     47 y/o F h/o HTN SCD presented to OSH with arm pain and weight loss found to have serratia bacteremia and erosive bony process of BUE thought to be c/w hematogenous osteomyelitis vs metastatic disease s/p IR bone biopsy and joint aspiration 5/25 with R radius cx growing sensitive serratia marcescens sent home with 6 weeks of PO ciprofloxacin    Feeling well, no more UE pain, has some LLE pain    Past Medical History:   Diagnosis Date    Hypertension     Sickle cell anemia        Social History     Social History    Marital status: Single     Spouse name: N/A    Number of children: N/A    Years of education: N/A     Occupational History    Not on file.     Social History Main Topics    Smoking status: Never Smoker    Smokeless tobacco: Not on file    Alcohol use No    Drug use: No    Sexual activity: Not Currently     Other Topics Concern    Not on file     Social History Narrative    No narrative on file         Current Outpatient Prescriptions:     busPIRone (BUSPAR) 15 MG tablet, Take 15 mg by mouth 3 (three) times daily. 3-4 x/day, Disp: , Rfl:     carvedilol (COREG) 6.25 MG tablet, Take 6.25 mg by mouth 2 (two) times daily with meals., Disp: , Rfl:     ciprofloxacin HCl (CIPRO) 750 MG tablet, Take 1 tablet (750 mg total) by mouth every 12 (twelve) hours., Disp: 74 tablet, Rfl: 0    citalopram (CELEXA) 20 MG tablet, Take 20 mg by mouth once daily., Disp: , Rfl:     clonazePAM (KLONOPIN) 1 MG tablet, Take 1 mg by mouth 2 (two) times daily., Disp: , Rfl:     cyclobenzaprine (FLEXERIL) 5 MG tablet, Take 5 mg by mouth 2 (two) times daily. , Disp: , Rfl:     fluconazole (DIFLUCAN) 100 MG tablet, Take 1 tablet (100 mg total) by mouth once daily., Disp: 37 tablet, Rfl: 0    lubiprostone (AMITIZA) 24 MCG Cap, Take 24 mcg by mouth 2 (two) times daily with  meals., Disp: , Rfl:     morphine (MSIR) 30 MG tablet, Take 30 mg by mouth 2 (two) times daily as needed for Pain., Disp: , Rfl:     oxycodone (ROXICODONE) 5 MG immediate release tablet, Take 5 mg by mouth every 4 (four) hours as needed for Pain., Disp: , Rfl:     potassium chloride SA (K-DUR,KLOR-CON) 10 MEQ tablet, Take 10 mEq by mouth 2 (two) times daily., Disp: , Rfl:     ziprasidone (GEODON) 40 MG Cap, Take 40 mg by mouth 2 (two) times daily with meals., Disp: , Rfl:     zolpidem (AMBIEN) 5 MG Tab, Take 5 mg by mouth nightly as needed., Disp: , Rfl:     Review of Systems   Constitutional: Negative for activity change, chills and fever.   HENT: Negative for congestion, mouth sores, rhinorrhea, sinus pressure and sore throat.    Eyes: Negative for photophobia, pain and redness.   Respiratory: Negative for cough, chest tightness, shortness of breath and wheezing.    Cardiovascular: Negative for chest pain and leg swelling.   Gastrointestinal: Negative for abdominal distention, abdominal pain, diarrhea, nausea and vomiting.   Endocrine: Negative for polyuria.   Genitourinary: Negative for decreased urine volume, dysuria and flank pain.   Musculoskeletal: Negative for joint swelling and neck pain.   Skin: Negative for color change.   Allergic/Immunologic: Negative for food allergies.   Neurological: Negative for dizziness, weakness and headaches.   Hematological: Negative for adenopathy.   Psychiatric/Behavioral: Negative for agitation and confusion. The patient is not nervous/anxious.            Objective:     There were no vitals filed for this visit.    There were no vitals filed for this visit.  Physical Exam   Constitutional: She is oriented to person, place, and time. She appears well-developed and well-nourished.   HENT:   Head: Normocephalic and atraumatic.   Eyes: Pupils are equal, round, and reactive to light.   Neck: Normal range of motion. Neck supple.   Cardiovascular: Normal rate.     Pulmonary/Chest: Effort normal and breath sounds normal.   Abdominal: Soft. Bowel sounds are normal.   Musculoskeletal: She exhibits no edema or tenderness.   Neurological: She is alert and oriented to person, place, and time.   Skin: Skin is warm and dry.   Psychiatric: She has a normal mood and affect.           Assessment/Plan:       No diagnosis found.  47 y/o F h/o HTN SCD presented to OSH with arm pain and weight loss found to have serratia bacteremia and erosive bony process of BUE thought to be c/w hematogenous osteomyelitis vs metastatic disease s/p IR bone biopsy and joint aspiration 5/25 with R radius cx growing sensitive serratia marcescens sent home with 6 weeks of PO ciprofloxacin  - doing very well, completing course in 1 week but has run out of cipro, will refill for 1 week  - f/u as needed

## 2017-07-27 LAB
ACID FAST MOD KINY STN SPEC: NORMAL
MYCOBACTERIUM SPEC QL CULT: NORMAL

## 2019-03-29 ENCOUNTER — HISTORICAL (OUTPATIENT)
Dept: RADIOLOGY | Facility: HOSPITAL | Age: 49
End: 2019-03-29

## 2022-08-10 NOTE — PLAN OF CARE
PATIENT INFORMATION  Aria Fernando       - 1928    CHIEF COMPLAINT  Chief Complaint   Patient presents with   • Wound Check     Right knee        HISTORY OF PRESENT ILLNESS  HPI suffered a fall and had a full-thickness injury to her right knee area skin and soft tissue with a hematoma she had a fracture as well that was treated conservatively.  She has been seeing wound care.  She was referred to see me today.  She has been on intravenous antibiotics for 2 weeks.  She has a history of congestive heart failure is on Xarelto for atrial fibrillation and is in a nursing home.  She is currently in a wheelchair.        REVIEW OF SYSTEMS  Review of Systems   Constitutional: Negative for activity change, chills, fever and unexpected weight change.   HENT: Negative for congestion.    Eyes: Negative for visual disturbance.   Respiratory: Negative for shortness of breath.    Cardiovascular: Negative for chest pain and palpitations.   Gastrointestinal: Negative for abdominal pain and blood in stool.   Endocrine: Negative for cold intolerance and heat intolerance.   Genitourinary: Negative for hematuria.   Musculoskeletal: Negative for gait problem.   Skin: Negative for color change.   Allergic/Immunologic: Negative for immunocompromised state.   Neurological: Negative for weakness and light-headedness.   Hematological: Negative for adenopathy.   Psychiatric/Behavioral: Negative for sleep disturbance. The patient is not nervous/anxious.          ACTIVE PROBLEMS  Patient Active Problem List    Diagnosis    • Hematoma [T14.8XXA]    • Hemorrhagic disorder due to extrinsic circulating anticoagulants (HCC) [D68.32]    • Acute on chronic diastolic congestive heart failure (HCC) [I50.33]    • Anemia due to blood loss [D50.0]    • Lower GI bleed [K92.2]    • Atrial fibrillation (HCC) [I48.91]    • Anemia due to blood loss, acute [D62]    • Essential hypertension, benign [I10]    • Mesenteric venous thrombosis (HCC)  "Per Dr. Heredia, pt transitioned from IV abx to po.  Possible dc tomorrow.    Per ID notes, :"-please arrange for ID follow up in 3-4 weeks"    Future Appointments  Date Time Provider Department Center   6/12/2017 11:30 AM Uday Mcknight MD [facility] ID [facility]       Home vs plan B:  home infusion      05/29/17 1412   Right Care Assessment   Can the patient answer the patient profile reliably? Yes, cognitively intact   How often would a person be available to care for the patient? Whenever needed   Describe the patient's ability to walk at the present time. Minor restrictions or changes   How does the patient rate their overall health at the present time? Fair   Number of comorbid conditions (as recorded on the chart) Two   During the past month, has the patient often been bothered by feeling down, depressed or hopeless? No   During the past month, has the patient often been bothered by little interest or pleasure in doing things? No     " [V05.895]          PAST MEDICAL HISTORY  Past Medical History:   Diagnosis Date   • A-fib (HCC)    • Anticoagulant-induced bleeding (HCC)    • Arthritis    • CHF (congestive heart failure) (HCC)    • Coronary artery disease    • DVT (deep venous thrombosis) (HCC)    • GERD (gastroesophageal reflux disease)    • History of transfusion    • Hyperlipidemia    • Hypertension    • Neuropathy due to peripheral vascular disease (HCC)    • On anticoagulant therapy    • Osteoarthritis    • Polycythemia    • PVD (peripheral vascular disease) (HCC)          SURGICAL HISTORY  Past Surgical History:   Procedure Laterality Date   • APPENDECTOMY     • CAROTID ENDARTERECTOMY Left    • COLONOSCOPY N/A 3/3/2018    Procedure:  Colonoscopy to Terminal ileum with APC treatment for AVM's in right colon and cold biopsy;  Surgeon: Terrie Carroll MD;  Location: [facility] ENDOSCOPY;  Service:    • ENDOSCOPY N/A 3/3/2018    Procedure: EGD with biopsy;  Surgeon: Terrie Carroll MD;  Location: [facility] ENDOSCOPY;  Service:          FAMILY HISTORY  History reviewed. No pertinent family history.      SOCIAL HISTORY  Social History     Occupational History   • Not on file   Tobacco Use   • Smoking status: Never Smoker   • Smokeless tobacco: Never Used   Vaping Use   • Vaping Use: Never used   Substance and Sexual Activity   • Alcohol use: No   • Drug use: No   • Sexual activity: Never         CURRENT MEDICATIONS    Current Outpatient Medications:   •  acetaminophen (TYLENOL) 325 MG tablet, Take 2 tablets by mouth Every 4 (Four) Hours As Needed for Mild Pain ., Disp: , Rfl:   •  alendronate (FOSAMAX) 70 MG tablet, Take 70 mg by mouth Every 7 (Seven) Days., Disp: , Rfl:   •  amiodarone (PACERONE) 200 MG tablet, Take 200 mg by mouth Daily., Disp: , Rfl:   •  amLODIPine (NORVASC) 5 MG tablet, Take 5 mg by mouth Daily., Disp: , Rfl:   •  aspirin 81 MG chewable tablet, Chew 81 mg Daily., Disp: , Rfl:   •  Calcium Carb-Cholecalciferol (OYSTER SHELL  "CALCIUM/VITAMIN D) 250-125 MG-UNIT tablet tablet, Take 2 tablets by mouth 2 (Two) Times a Day., Disp: , Rfl:   •  cholecalciferol (VITAMIN D3) 400 units tablet, Take 400 Units by mouth 2 (Two) Times a Day., Disp: , Rfl:   •  docusate sodium (COLACE) 100 MG capsule, Take 100 mg by mouth 2 (Two) Times a Day., Disp: , Rfl:   •  doxycycline 100 mg in sodium chloride 0.9 % 100 mL IVPB, Infuse 100 mg into a venous catheter 2 (Two) Times a Day., Disp: , Rfl:   •  ferrous sulfate 325 (65 FE) MG tablet, Take 325 mg by mouth Daily With Breakfast., Disp: , Rfl:   •  gabapentin (NEURONTIN) 100 MG capsule, Take 100 mg by mouth 2 (Two) Times a Day., Disp: , Rfl:   •  metoprolol tartrate (LOPRESSOR) 25 MG tablet, Take 12.5 mg by mouth 2 (Two) Times a Day., Disp: , Rfl:   •  mirtazapine (REMERON) 15 MG tablet, Take 15 mg by mouth Every Night., Disp: , Rfl:   •  omeprazole (priLOSEC) 20 MG capsule, Take 20 mg by mouth Daily., Disp: , Rfl:   •  ondansetron (ZOFRAN) 4 MG tablet, Take 4 mg by mouth Every 4 (Four) Hours As Needed for Nausea or Vomiting., Disp: , Rfl:   •  rosuvastatin (CRESTOR) 5 MG tablet, Take 5 mg by mouth Daily., Disp: , Rfl:   •  venlafaxine (EFFEXOR) 75 MG tablet, , Disp: , Rfl:   •  Xarelto 20 MG tablet, , Disp: , Rfl:     ALLERGIES  Clinoril [sulindac], Codeine, Coumadin [warfarin sodium], Ibuprofen-oxycodone [oxycodone-ibuprofen], Keflex [cephalexin], Mydriacyl [tropicamide], Don-synephrine [phenylephrine], Penicillins, Phenylephrine hcl, Sulfa antibiotics, and Zantac [ranitidine hcl]    VITALS  Vitals:    08/10/22 1522   BP: 120/80   BP Location: Left arm   Patient Position: Sitting   Cuff Size: Adult   Weight: 78 kg (172 lb)   Height: 154.9 cm (61\")       LAST RESULTS   Lab Requisition on 07/13/2022   Component Date Value Ref Range Status   • Wound Culture 07/13/2022 Light growth (2+) Enterococcus faecalis (A)  Final   • Wound Culture 07/13/2022 Scant growth (1+) Staphylococcus aureus (A)  Final   • Wound " Culture 07/13/2022 Scant growth (1+) Normal Skin Magaly   Final   • Gram Stain 07/13/2022 No WBCs seen   Final   • Gram Stain 07/13/2022 No organisms seen   Final     CT Lower Extremity Right Without Contrast    Result Date: 7/28/2022  Narrative: HISTORY: Contusion right knee. Fell May 22. No surgery. COMPARISON: Right knee radiographs 5/25/2022 TECHNIQUE: CT Right Knee without contrast. 2-D coronal and sagittal reconstructions were performed. Radiation dose reduction techniques included automated exposure control or exposure modulation based on body size. Radiation audit for CT and nuclear cardiology exams in the last 12 months: 0. FINDINGS: Minimal effusion without popliteal cyst. Slightly comminuted probable subacute avulsion fracture of the posterior central tibia at the PCL enthesis. The main fracture fragment measures 11 mm transverse by 11 mm AP and is minimally cranially displaced by 4 mm. No additional fracture identified. No evidence of osteonecrosis. Meniscal chondrocalcinosis laterally. Minimal tricompartmental osteophyte formation. Patellofemoral alignment is normal. ACL grossly intact. Prominent confluent edema/inflammation/fluid and/or hemorrhage anteriorly extending from the patella to the proximal shaft of the tibia. Probable skin wound with superficial subcutaneous gas measuring 13 mm transverse. Additional tiny gas bubbles extending inferiorly. Correlate for cellulitis/soft tissue infection and abscess. Popliteal arterial calcification. Vastus lateralis muscle atrophy.     Impression: 1. Slightly comminuted and minimally displaced likely subacute PCL avulsion fracture posterior central tibia. 2. Prominent confluent edema/inflammation/fluid and/or hemorrhage anteriorly extending from the patella to the proximal shaft of the tibia. Probable skin wound with superficial subcutaneous gas measuring 13 mm transverse. Additional tiny gas bubbles extending inferiorly. Correlate for cellulitis/soft tissue  infection and abscess. 3. Minimal effusion. 4. Lateral meniscal chondrocalcinosis and minimal tricompartmental osteophyte formation. 5.. Vastus lateralis muscle atrophy. 6. Popliteal artery calcification. Signer Name: Malaika Torres MD  Signed: 7/28/2022 9:47 PM  Workstation Name: [id number]  [facility]      PHYSICAL EXAM  Debilities/Disabilities Identified: None  Emotional Behavior: Appropriate  Physical Exam alert elderly white female in a wheelchair.  She is accompanied by a family member.  Her right knee shows an open wound with necrotic soft tissue with exudate that extends about 4 cm.  This is very close to her patella.  There are 2 smaller wounds that are more superficial.  I reviewed the CT scan myself and it shows some air in the soft tissue that is likely due to the open wound.  I reviewed Dr. Varma's most recent note and I reviewed the wound care services most recent note.  Heart is irregular lungs are clear and equal.  ASSESSMENT  Open necrotic wound right knee      PLAN  This will require an open debridement in the operating room.  I fear that it impacts to bone and thus is consistent with osteomyelitis.  This will require long-term intravenous antibiotics.  It may also require an above-the-knee amputation.  I discussed this with the patient and her daughter quite candidly.  We will have her see cardiology for optimization of her cardiac status prior to surgery.  We will start quarter strength Dakin wet-to-dry dressings to the necrotic wound daily at the nursing home.

## 2025-04-11 ENCOUNTER — HOSPITAL ENCOUNTER (EMERGENCY)
Facility: HOSPITAL | Age: 55
Discharge: HOME | End: 2025-04-11
Attending: EMERGENCY MEDICINE
Payer: MEDICARE

## 2025-04-11 VITALS
RESPIRATION RATE: 14 BRPM | OXYGEN SATURATION: 98 % | HEIGHT: 64 IN | BODY MASS INDEX: 37.56 KG/M2 | SYSTOLIC BLOOD PRESSURE: 124 MMHG | TEMPERATURE: 98 F | WEIGHT: 220 LBS | HEART RATE: 71 BPM | DIASTOLIC BLOOD PRESSURE: 99 MMHG

## 2025-04-11 DIAGNOSIS — D57.00 SICKLE CELL ANEMIA WITH PAIN: Primary | ICD-10-CM

## 2025-04-11 LAB
ALBUMIN SERPL-MCNC: 3.5 G/DL (ref 3.5–5)
ALBUMIN/GLOB SERPL: 0.8 RATIO (ref 1.1–2)
ALP SERPL-CCNC: 102 UNIT/L (ref 40–150)
ALT SERPL-CCNC: 32 UNIT/L (ref 0–55)
ANION GAP SERPL CALC-SCNC: 6 MEQ/L
AST SERPL-CCNC: 37 UNIT/L (ref 11–45)
BACTERIA #/AREA URNS AUTO: ABNORMAL /HPF
BASOPHILS # BLD AUTO: 0.05 X10(3)/MCL
BASOPHILS NFR BLD AUTO: 0.9 %
BILIRUB SERPL-MCNC: 0.7 MG/DL
BILIRUB UR QL STRIP.AUTO: NEGATIVE
BUN SERPL-MCNC: 7.6 MG/DL (ref 9.8–20.1)
CALCIUM SERPL-MCNC: 8.7 MG/DL (ref 8.4–10.2)
CHLORIDE SERPL-SCNC: 108 MMOL/L (ref 98–107)
CK SERPL-CCNC: 313 U/L (ref 29–168)
CLARITY UR: ABNORMAL
CO2 SERPL-SCNC: 28 MMOL/L (ref 22–29)
COLOR UR AUTO: YELLOW
CREAT SERPL-MCNC: 0.75 MG/DL (ref 0.55–1.02)
CREAT/UREA NIT SERPL: 10
EOSINOPHIL # BLD AUTO: 0.55 X10(3)/MCL (ref 0–0.9)
EOSINOPHIL NFR BLD AUTO: 9.6 %
ERYTHROCYTE [DISTWIDTH] IN BLOOD BY AUTOMATED COUNT: 16.6 % (ref 11.5–17)
GFR SERPLBLD CREATININE-BSD FMLA CKD-EPI: >60 ML/MIN/1.73/M2
GLOBULIN SER-MCNC: 4.4 GM/DL (ref 2.4–3.5)
GLUCOSE SERPL-MCNC: 105 MG/DL (ref 74–100)
GLUCOSE UR QL STRIP: NORMAL
HCT VFR BLD AUTO: 28.9 % (ref 37–47)
HGB BLD-MCNC: 10 G/DL (ref 12–16)
HGB UR QL STRIP: NEGATIVE
IMM GRANULOCYTES # BLD AUTO: 0.01 X10(3)/MCL (ref 0–0.04)
IMM GRANULOCYTES NFR BLD AUTO: 0.2 %
KETONES UR QL STRIP: NEGATIVE
LEUKOCYTE ESTERASE UR QL STRIP: NEGATIVE
LYMPHOCYTES # BLD AUTO: 2.78 X10(3)/MCL (ref 0.6–4.6)
LYMPHOCYTES NFR BLD AUTO: 48.7 %
MCH RBC QN AUTO: 25.4 PG (ref 27–31)
MCHC RBC AUTO-ENTMCNC: 34.6 G/DL (ref 33–36)
MCV RBC AUTO: 73.4 FL (ref 80–94)
MONOCYTES # BLD AUTO: 0.62 X10(3)/MCL (ref 0.1–1.3)
MONOCYTES NFR BLD AUTO: 10.9 %
MUCOUS THREADS URNS QL MICRO: ABNORMAL /LPF
NEUTROPHILS # BLD AUTO: 1.7 X10(3)/MCL (ref 2.1–9.2)
NEUTROPHILS NFR BLD AUTO: 29.7 %
NITRITE UR QL STRIP: NEGATIVE
NRBC BLD AUTO-RTO: 1.4 %
PH UR STRIP: 6.5 [PH]
PLATELET # BLD AUTO: 377 X10(3)/MCL (ref 130–400)
PMV BLD AUTO: 9.1 FL (ref 7.4–10.4)
POTASSIUM SERPL-SCNC: 3.7 MMOL/L (ref 3.5–5.1)
PROT SERPL-MCNC: 7.9 GM/DL (ref 6.4–8.3)
PROT UR QL STRIP: NEGATIVE
RBC # BLD AUTO: 3.94 X10(6)/MCL (ref 4.2–5.4)
RBC #/AREA URNS AUTO: ABNORMAL /HPF
RET# (OHS): 0.12 X10E6/UL (ref 0.02–0.08)
RETICULOCYTE COUNT AUTOMATED (OLG): 2.96 % (ref 1.1–2.1)
SODIUM SERPL-SCNC: 142 MMOL/L (ref 136–145)
SP GR UR STRIP.AUTO: 1.02 (ref 1–1.03)
SQUAMOUS #/AREA URNS LPF: ABNORMAL /HPF
UROBILINOGEN UR STRIP-ACNC: 4
WBC # BLD AUTO: 5.71 X10(3)/MCL (ref 4.5–11.5)
WBC #/AREA URNS AUTO: ABNORMAL /HPF
YEAST BUDDING URNS QL: ABNORMAL /HPF

## 2025-04-11 PROCEDURE — 85045 AUTOMATED RETICULOCYTE COUNT: CPT | Performed by: EMERGENCY MEDICINE

## 2025-04-11 PROCEDURE — 81001 URINALYSIS AUTO W/SCOPE: CPT | Performed by: EMERGENCY MEDICINE

## 2025-04-11 PROCEDURE — 25000003 PHARM REV CODE 250: Performed by: EMERGENCY MEDICINE

## 2025-04-11 PROCEDURE — 99284 EMERGENCY DEPT VISIT MOD MDM: CPT | Mod: 25

## 2025-04-11 PROCEDURE — 96374 THER/PROPH/DIAG INJ IV PUSH: CPT

## 2025-04-11 PROCEDURE — 82550 ASSAY OF CK (CPK): CPT | Performed by: EMERGENCY MEDICINE

## 2025-04-11 PROCEDURE — 63600175 PHARM REV CODE 636 W HCPCS: Performed by: EMERGENCY MEDICINE

## 2025-04-11 PROCEDURE — 96361 HYDRATE IV INFUSION ADD-ON: CPT

## 2025-04-11 PROCEDURE — 80053 COMPREHEN METABOLIC PANEL: CPT | Performed by: EMERGENCY MEDICINE

## 2025-04-11 PROCEDURE — 85025 COMPLETE CBC W/AUTO DIFF WBC: CPT | Performed by: EMERGENCY MEDICINE

## 2025-04-11 RX ORDER — GABAPENTIN 300 MG/1
300 CAPSULE ORAL 3 TIMES DAILY
Qty: 42 CAPSULE | Refills: 0 | Status: SHIPPED | OUTPATIENT
Start: 2025-04-11 | End: 2025-04-25

## 2025-04-11 RX ORDER — GABAPENTIN 300 MG/1
300 CAPSULE ORAL
Status: COMPLETED | OUTPATIENT
Start: 2025-04-11 | End: 2025-04-11

## 2025-04-11 RX ORDER — FENTANYL CITRATE 50 UG/ML
100 INJECTION, SOLUTION INTRAMUSCULAR; INTRAVENOUS
Refills: 0 | Status: COMPLETED | OUTPATIENT
Start: 2025-04-11 | End: 2025-04-11

## 2025-04-11 RX ORDER — CYCLOBENZAPRINE HCL 10 MG
10 TABLET ORAL 3 TIMES DAILY PRN
Qty: 15 TABLET | Refills: 0 | Status: SHIPPED | OUTPATIENT
Start: 2025-04-11 | End: 2025-04-16

## 2025-04-11 RX ADMIN — SODIUM CHLORIDE, POTASSIUM CHLORIDE, SODIUM LACTATE AND CALCIUM CHLORIDE 1000 ML: 600; 310; 30; 20 INJECTION, SOLUTION INTRAVENOUS at 04:04

## 2025-04-11 RX ADMIN — FENTANYL CITRATE 100 MCG: 50 INJECTION, SOLUTION INTRAMUSCULAR; INTRAVENOUS at 04:04

## 2025-04-11 RX ADMIN — GABAPENTIN 300 MG: 300 CAPSULE ORAL at 04:04

## 2025-04-11 NOTE — ED PROVIDER NOTES
Encounter Date: 2025    SCRIBE #1 NOTE: I, Taz Oshea, am scribing for, and in the presence of,  Myles Graham MD. I have scribed the following portions of the note - Other sections scribed: HPI, ROS, PE.       History     Chief Complaint   Patient presents with    Sickle Cell Pain Crisis     Ambulatory. Reports legs, arms, and back pain x 3 days. States her home pain meds are not helping. GCS 15. Denies fever.     Patient is a 53 y/o female with a hx of HTN and sickle cell anemia who presents to the ED for myalgias for the past 3 days. Pt states she has been having worsening pain to her bilateral arms and legs over the course of the last few days. She endorses taking percocet with no relief of pain. Pt does note this pain is different than her typical sickle cell pain crisis. She also notes she is in the process of moving houses and has been under a lot of recent stress and doing a lot of heavy lifting over the last few weeks.     The history is provided by the patient and medical records. No  was used.     Review of patient's allergies indicates:   Allergen Reactions    Floxin [ofloxacin]     Folic acid containing drugs     Toradol [ketorolac]      Past Medical History:   Diagnosis Date    Hypertension     Sickle cell anemia      Past Surgical History:   Procedure Laterality Date     SECTION      LEG SURGERY      PORTACATH PLACEMENT      portacath removal      TUBAL LIGATION       No family history on file.  Social History[1]  Review of Systems   Constitutional:  Negative for chills, fatigue and fever.   HENT:  Negative for congestion and sore throat.    Eyes:  Negative for visual disturbance.   Respiratory:  Negative for cough and shortness of breath.    Cardiovascular:  Negative for chest pain.   Gastrointestinal:  Negative for abdominal pain, diarrhea, nausea and vomiting.   Genitourinary:  Negative for dysuria.   Musculoskeletal:  Positive for myalgias.   Skin:   Negative for rash.   Neurological:  Negative for weakness, numbness and headaches.       Physical Exam     Initial Vitals [04/11/25 1524]   BP Pulse Resp Temp SpO2   118/78 70 16 98 °F (36.7 °C) 97 %      MAP       --         Physical Exam    Nursing note and vitals reviewed.  Constitutional: She appears well-developed and well-nourished.   HENT:   Head: Normocephalic and atraumatic.   Right Ear: External ear normal.   Left Ear: External ear normal.   Eyes: Conjunctivae and EOM are normal. Pupils are equal, round, and reactive to light.   Neck: Neck supple.   Normal range of motion.  Cardiovascular:  Normal rate, regular rhythm, normal heart sounds and intact distal pulses.           Pulmonary/Chest: Breath sounds normal.   Abdominal: Abdomen is soft. Bowel sounds are normal.   Musculoskeletal:         General: Normal range of motion.      Cervical back: Normal range of motion and neck supple.     Neurological: She is alert and oriented to person, place, and time. GCS score is 15. GCS eye subscore is 4. GCS verbal subscore is 5. GCS motor subscore is 6.   Skin: Skin is warm and dry. Capillary refill takes less than 2 seconds.   Psychiatric: She has a normal mood and affect. Her behavior is normal. Judgment and thought content normal.         ED Course   Procedures  Labs Reviewed   COMPREHENSIVE METABOLIC PANEL - Abnormal       Result Value    Sodium 142      Potassium 3.7      Chloride 108 (*)     CO2 28      Glucose 105 (*)     Blood Urea Nitrogen 7.6 (*)     Creatinine 0.75      Calcium 8.7      Protein Total 7.9      Albumin 3.5      Globulin 4.4 (*)     Albumin/Globulin Ratio 0.8 (*)     Bilirubin Total 0.7            ALT 32      AST 37      eGFR >60      Anion Gap 6.0      BUN/Creatinine Ratio 10     RETICULOCYTES - Abnormal    Retic Cnt Auto 2.96 (*)     RET# 0.1166 (*)    URINALYSIS, REFLEX TO URINE CULTURE - Abnormal    Color, UA Yellow      Appearance, UA Turbid (*)     Specific Gravity, UA 1.017       pH, UA 6.5      Protein, UA Negative      Glucose, UA Normal      Ketones, UA Negative      Blood, UA Negative      Bilirubin, UA Negative      Urobilinogen, UA 4.0 (*)     Nitrites, UA Negative      Leukocyte Esterase, UA Negative      RBC, UA 0-5      WBC, UA 0-5      Bacteria, UA None Seen      Budding Yeast, UA Trace (*)     Squamous Epithelial Cells, UA Trace      Mucous, UA Trace (*)    CBC WITH DIFFERENTIAL - Abnormal    WBC 5.71      RBC 3.94 (*)     Hgb 10.0 (*)     Hct 28.9 (*)     MCV 73.4 (*)     MCH 25.4 (*)     MCHC 34.6      RDW 16.6      Platelet 377      MPV 9.1      Neut % 29.7      Lymph % 48.7      Mono % 10.9      Eos % 9.6      Basophil % 0.9      Imm Grans % 0.2      Neut # 1.70 (*)     Lymph # 2.78      Mono # 0.62      Eos # 0.55      Baso # 0.05      Imm Gran # 0.01      NRBC% 1.4     CK - Abnormal    Creatine Kinase 313 (*)    CBC W/ AUTO DIFFERENTIAL    Narrative:     The following orders were created for panel order CBC auto differential.  Procedure                               Abnormality         Status                     ---------                               -----------         ------                     CBC with Differential[[id number]]       Abnormal            Final result                 Please view results for these tests on the individual orders.          Imaging Results    None          Medications   lactated ringers bolus 1,000 mL (1,000 mLs Intravenous New Bag 4/11/25 1652)   fentaNYL injection 100 mcg (100 mcg Intravenous Given 4/11/25 1653)   gabapentin capsule 300 mg (300 mg Oral Given 4/11/25 1653)     Medical Decision Making  Differential diagnosis includes but is not limited to: sickle cell pain, sickle cell crisis, sciatica, musculoskeletal pain, anxiety      Amount and/or Complexity of Data Reviewed  Labs: ordered.    Risk  Prescription drug management.            Scribe Attestation:   Scribe #1: I performed the above scribed service and the documentation  accurately describes the services I performed. I attest to the accuracy of the note.    Attending Attestation:           Physician Attestation for Scribe:  Physician Attestation Statement for Scribe #1: I, Myles Graham MD, reviewed documentation, as scribed by Taz Oshea in my presence, and it is both accurate and complete.             ED Course as of 04/11/25 1808 Fri Apr 11, 2025 1806 Feeling much better after IVF and meds.  Doubt sickle cell-related pain given Hgb > 10.  Will D/C with symptomatic treatment.  She takes Percocet at home for her chronic pain - will add muscle relaxant and gabapentin. [CL]      ED Course User Index  [CL] Myles Graham MD                           Clinical Impression:  Final diagnoses:  [D57.00] Sickle cell anemia with pain (Primary)          ED Disposition Condition    Discharge Stable          ED Prescriptions       Medication Sig Dispense Start Date End Date Auth. Provider    cyclobenzaprine (FLEXERIL) 10 MG tablet Take 1 tablet (10 mg total) by mouth 3 (three) times daily as needed for Muscle spasms. 15 tablet 4/11/2025 4/16/2025 Myles Graham MD    gabapentin (NEURONTIN) 300 MG capsule Take 1 capsule (300 mg total) by mouth 3 (three) times daily. for 14 days 42 capsule 4/11/2025 4/25/2025 Myles Graham MD          Follow-up Information       Follow up With Specialties Details Why Contact Info    Call [phone] to establish primary care with [facility]                     [1]   Social History  Tobacco Use    Smoking status: Never    Smokeless tobacco: Never   Substance Use Topics    Alcohol use: No    Drug use: No        Myles Graham MD  04/11/25 1808